# Patient Record
Sex: FEMALE | Race: WHITE | Employment: OTHER | URBAN - METROPOLITAN AREA
[De-identification: names, ages, dates, MRNs, and addresses within clinical notes are randomized per-mention and may not be internally consistent; named-entity substitution may affect disease eponyms.]

---

## 2017-01-13 ENCOUNTER — NURSE ONLY (OUTPATIENT)
Dept: FAMILY MEDICINE CLINIC | Age: 62
End: 2017-01-13

## 2017-01-13 DIAGNOSIS — E53.8 B12 DEFICIENCY: Primary | ICD-10-CM

## 2017-01-13 PROCEDURE — 96372 THER/PROPH/DIAG INJ SC/IM: CPT | Performed by: FAMILY MEDICINE

## 2017-01-13 RX ORDER — CYANOCOBALAMIN 1000 UG/ML
1000 INJECTION INTRAMUSCULAR; SUBCUTANEOUS ONCE
Status: COMPLETED | OUTPATIENT
Start: 2017-01-13 | End: 2017-01-13

## 2017-01-13 RX ADMIN — CYANOCOBALAMIN 1000 MCG: 1000 INJECTION INTRAMUSCULAR; SUBCUTANEOUS at 11:48

## 2017-03-17 ENCOUNTER — NURSE ONLY (OUTPATIENT)
Dept: FAMILY MEDICINE CLINIC | Age: 62
End: 2017-03-17

## 2017-03-17 DIAGNOSIS — E55.9 VITAMIN D DEFICIENCY: Primary | ICD-10-CM

## 2017-03-17 PROCEDURE — 96372 THER/PROPH/DIAG INJ SC/IM: CPT | Performed by: FAMILY MEDICINE

## 2017-03-17 RX ORDER — CYANOCOBALAMIN 1000 UG/ML
1000 INJECTION INTRAMUSCULAR; SUBCUTANEOUS ONCE
Status: COMPLETED | OUTPATIENT
Start: 2017-03-17 | End: 2017-03-17

## 2017-03-17 RX ADMIN — CYANOCOBALAMIN 1000 MCG: 1000 INJECTION INTRAMUSCULAR; SUBCUTANEOUS at 12:02

## 2017-04-03 ENCOUNTER — TELEPHONE (OUTPATIENT)
Dept: FAMILY MEDICINE CLINIC | Age: 62
End: 2017-04-03

## 2017-04-03 DIAGNOSIS — E55.9 VITAMIN D DEFICIENCY: ICD-10-CM

## 2017-04-03 RX ORDER — ERGOCALCIFEROL 1.25 MG/1
50000 CAPSULE ORAL
Qty: 8 CAPSULE | Refills: 5 | Status: SHIPPED | OUTPATIENT
Start: 2017-04-03 | End: 2017-09-14 | Stop reason: SDUPTHER

## 2017-04-08 ENCOUNTER — EMPLOYEE WELLNESS (OUTPATIENT)
Dept: OTHER | Age: 62
End: 2017-04-08

## 2017-04-08 LAB
CHOLESTEROL, TOTAL: 136 MG/DL (ref 0–199)
FASTING: YES
GLUCOSE BLD-MCNC: 90 MG/DL (ref 74–109)
HDLC SERPL-MCNC: 51 MG/DL (ref 40–90)
LDL CHOLESTEROL CALCULATED: 72 MG/DL
TRIGL SERPL-MCNC: 66 MG/DL (ref 0–199)

## 2017-05-17 ENCOUNTER — NURSE ONLY (OUTPATIENT)
Dept: FAMILY MEDICINE CLINIC | Age: 62
End: 2017-05-17

## 2017-05-17 DIAGNOSIS — E53.8 B12 DEFICIENCY: Primary | ICD-10-CM

## 2017-05-17 PROCEDURE — 96372 THER/PROPH/DIAG INJ SC/IM: CPT | Performed by: FAMILY MEDICINE

## 2017-05-17 RX ORDER — CYANOCOBALAMIN 1000 UG/ML
1000 INJECTION INTRAMUSCULAR; SUBCUTANEOUS ONCE
Status: COMPLETED | OUTPATIENT
Start: 2017-05-17 | End: 2017-05-17

## 2017-05-17 RX ADMIN — CYANOCOBALAMIN 1000 MCG: 1000 INJECTION INTRAMUSCULAR; SUBCUTANEOUS at 11:10

## 2017-06-20 RX ORDER — FUROSEMIDE 40 MG/1
40 TABLET ORAL DAILY
Qty: 30 TABLET | Refills: 0 | Status: SHIPPED | OUTPATIENT
Start: 2017-06-20 | End: 2018-03-05 | Stop reason: SDUPTHER

## 2017-07-20 ENCOUNTER — NURSE ONLY (OUTPATIENT)
Dept: FAMILY MEDICINE CLINIC | Age: 62
End: 2017-07-20
Payer: COMMERCIAL

## 2017-07-20 ENCOUNTER — TELEPHONE (OUTPATIENT)
Dept: FAMILY MEDICINE CLINIC | Age: 62
End: 2017-07-20

## 2017-07-20 DIAGNOSIS — E53.8 B12 DEFICIENCY: Primary | ICD-10-CM

## 2017-07-20 PROCEDURE — 96372 THER/PROPH/DIAG INJ SC/IM: CPT | Performed by: FAMILY MEDICINE

## 2017-07-20 RX ORDER — CYANOCOBALAMIN 1000 UG/ML
1000 INJECTION INTRAMUSCULAR; SUBCUTANEOUS ONCE
Status: COMPLETED | OUTPATIENT
Start: 2017-07-20 | End: 2017-07-20

## 2017-07-20 RX ADMIN — CYANOCOBALAMIN 1000 MCG: 1000 INJECTION INTRAMUSCULAR; SUBCUTANEOUS at 11:44

## 2017-07-24 ENCOUNTER — TELEPHONE (OUTPATIENT)
Dept: FAMILY MEDICINE CLINIC | Age: 62
End: 2017-07-24

## 2017-07-26 ENCOUNTER — HOSPITAL ENCOUNTER (OUTPATIENT)
Dept: CT IMAGING | Age: 62
Discharge: HOME | End: 2017-07-26
Payer: COMMERCIAL

## 2017-07-26 ENCOUNTER — OFFICE VISIT (OUTPATIENT)
Dept: FAMILY MEDICINE CLINIC | Age: 62
End: 2017-07-26
Payer: COMMERCIAL

## 2017-07-26 ENCOUNTER — TELEPHONE (OUTPATIENT)
Dept: FAMILY MEDICINE CLINIC | Age: 62
End: 2017-07-26

## 2017-07-26 VITALS
HEIGHT: 68 IN | RESPIRATION RATE: 16 BRPM | DIASTOLIC BLOOD PRESSURE: 82 MMHG | WEIGHT: 268 LBS | SYSTOLIC BLOOD PRESSURE: 110 MMHG | TEMPERATURE: 97.7 F | BODY MASS INDEX: 40.62 KG/M2 | HEART RATE: 64 BPM

## 2017-07-26 DIAGNOSIS — R51.9 PERSISTENT HEADACHES: ICD-10-CM

## 2017-07-26 DIAGNOSIS — M54.2 CERVICAL SPINE PAIN: ICD-10-CM

## 2017-07-26 DIAGNOSIS — S09.90XA HEAD INJURY, INITIAL ENCOUNTER: Primary | ICD-10-CM

## 2017-07-26 DIAGNOSIS — S09.90XA HEAD INJURY, INITIAL ENCOUNTER: ICD-10-CM

## 2017-07-26 PROCEDURE — 99213 OFFICE O/P EST LOW 20 MIN: CPT | Performed by: NURSE PRACTITIONER

## 2017-07-26 PROCEDURE — 72125 CT NECK SPINE W/O DYE: CPT

## 2017-07-26 PROCEDURE — 70450 CT HEAD/BRAIN W/O DYE: CPT

## 2017-07-26 RX ORDER — TIZANIDINE 2 MG/1
2 TABLET ORAL 3 TIMES DAILY
Qty: 30 TABLET | Refills: 0 | Status: SHIPPED | OUTPATIENT
Start: 2017-07-26 | End: 2017-08-05

## 2017-07-26 ASSESSMENT — ENCOUNTER SYMPTOMS
COUGH: 0
APNEA: 0
TROUBLE SWALLOWING: 0
ANAL BLEEDING: 0
NAUSEA: 0
SHORTNESS OF BREATH: 0
BLOOD IN STOOL: 0
CONSTIPATION: 0
ABDOMINAL PAIN: 0
PHOTOPHOBIA: 0
WHEEZING: 0
DIARRHEA: 0
BACK PAIN: 0
VOICE CHANGE: 0
VOMITING: 0
ABDOMINAL DISTENTION: 0

## 2017-09-14 DIAGNOSIS — E55.9 VITAMIN D DEFICIENCY: ICD-10-CM

## 2017-09-14 RX ORDER — ERGOCALCIFEROL 1.25 MG/1
50000 CAPSULE ORAL
Qty: 8 CAPSULE | Refills: 5 | Status: SHIPPED | OUTPATIENT
Start: 2017-09-14 | End: 2018-03-22 | Stop reason: SDUPTHER

## 2017-09-20 ENCOUNTER — NURSE ONLY (OUTPATIENT)
Dept: FAMILY MEDICINE CLINIC | Age: 62
End: 2017-09-20
Payer: COMMERCIAL

## 2017-09-20 DIAGNOSIS — E53.8 B12 DEFICIENCY: Primary | ICD-10-CM

## 2017-09-20 PROCEDURE — 96372 THER/PROPH/DIAG INJ SC/IM: CPT | Performed by: FAMILY MEDICINE

## 2017-09-20 RX ORDER — CYANOCOBALAMIN 1000 UG/ML
1000 INJECTION INTRAMUSCULAR; SUBCUTANEOUS ONCE
Status: COMPLETED | OUTPATIENT
Start: 2017-09-20 | End: 2017-09-20

## 2017-09-20 RX ADMIN — CYANOCOBALAMIN 1000 MCG: 1000 INJECTION INTRAMUSCULAR; SUBCUTANEOUS at 11:18

## 2017-09-25 ENCOUNTER — HOSPITAL ENCOUNTER (OUTPATIENT)
Age: 62
Discharge: HOME | End: 2017-09-25
Payer: COMMERCIAL

## 2017-09-25 DIAGNOSIS — E53.8 B12 DEFICIENCY: ICD-10-CM

## 2017-09-25 LAB — VITAMIN B-12: 591 PG/ML (ref 211–911)

## 2017-09-25 PROCEDURE — 82607 VITAMIN B-12: CPT

## 2017-09-25 PROCEDURE — 36415 COLL VENOUS BLD VENIPUNCTURE: CPT

## 2017-10-31 ENCOUNTER — E-VISIT (OUTPATIENT)
Dept: FAMILY MEDICINE CLINIC | Age: 62
End: 2017-10-31

## 2017-10-31 DIAGNOSIS — J20.9 ACUTE BRONCHITIS, UNSPECIFIED ORGANISM: Primary | ICD-10-CM

## 2017-10-31 PROCEDURE — 99444 PR PHYSICIAN ONLINE EVALUATION & MANAGEMENT SERVICE: CPT | Performed by: FAMILY MEDICINE

## 2017-10-31 RX ORDER — AZITHROMYCIN 250 MG/1
TABLET, FILM COATED ORAL
Qty: 1 PACKET | Refills: 0 | Status: SHIPPED | OUTPATIENT
Start: 2017-10-31 | End: 2017-11-05

## 2017-10-31 ASSESSMENT — LIFESTYLE VARIABLES: SMOKING_STATUS: NO

## 2017-11-03 ENCOUNTER — HOSPITAL ENCOUNTER (OUTPATIENT)
Dept: WOMENS IMAGING | Age: 62
Discharge: HOME | End: 2017-11-03
Payer: COMMERCIAL

## 2017-11-03 DIAGNOSIS — Z12.31 VISIT FOR SCREENING MAMMOGRAM: ICD-10-CM

## 2017-11-03 PROCEDURE — G0202 SCR MAMMO BI INCL CAD: HCPCS

## 2017-11-21 DIAGNOSIS — F32.A DEPRESSION, UNSPECIFIED DEPRESSION TYPE: ICD-10-CM

## 2017-11-21 RX ORDER — FLUOXETINE HYDROCHLORIDE 20 MG/1
20 CAPSULE ORAL DAILY
Qty: 90 CAPSULE | Refills: 3 | Status: SHIPPED | OUTPATIENT
Start: 2017-11-21 | End: 2018-11-20 | Stop reason: SDUPTHER

## 2017-11-22 DIAGNOSIS — F32.A DEPRESSION, UNSPECIFIED DEPRESSION TYPE: ICD-10-CM

## 2017-11-22 RX ORDER — FLUOXETINE HYDROCHLORIDE 20 MG/1
20 CAPSULE ORAL DAILY
Qty: 90 CAPSULE | Refills: 3 | OUTPATIENT
Start: 2017-11-22

## 2017-11-22 NOTE — TELEPHONE ENCOUNTER
From: Elbert Roth  Sent: 11/21/2017 7:47 AM EST  Subject: Medication Renewal Request    Elbert Roth would like a refill of the following medications:  FLUoxetine (PROZAC) 20 MG capsule Darline Roa MD]    Preferred pharmacy: [address] [place] [address], [address] [facility] -  [phone]    Comment:  Please refill my fluoxetine and send to the [address] [place] [address].  Thanks !!

## 2018-01-10 ENCOUNTER — E-VISIT (OUTPATIENT)
Dept: FAMILY MEDICINE CLINIC | Age: 63
End: 2018-01-10
Payer: COMMERCIAL

## 2018-01-10 DIAGNOSIS — J20.9 ACUTE BRONCHITIS, UNSPECIFIED ORGANISM: Primary | ICD-10-CM

## 2018-01-10 PROCEDURE — 99444 PR PHYSICIAN ONLINE EVALUATION & MANAGEMENT SERVICE: CPT | Performed by: FAMILY MEDICINE

## 2018-01-10 RX ORDER — AZITHROMYCIN 250 MG/1
TABLET, FILM COATED ORAL
Qty: 1 PACKET | Refills: 0 | Status: SHIPPED | OUTPATIENT
Start: 2018-01-10 | End: 2018-01-15

## 2018-01-10 ASSESSMENT — LIFESTYLE VARIABLES: SMOKING_STATUS: NO

## 2018-03-05 RX ORDER — FUROSEMIDE 40 MG/1
40 TABLET ORAL DAILY
Qty: 30 TABLET | Refills: 0 | Status: SHIPPED | OUTPATIENT
Start: 2018-03-05 | End: 2018-07-31 | Stop reason: SDUPTHER

## 2018-03-05 NOTE — TELEPHONE ENCOUNTER
From: Hari Gramajo  Sent: 3/5/2018 2:59 PM EST  Subject: Medication Renewal Request    Hari Gramajo would like a refill of the following medications:     furosemide (LASIX) 40 MG tablet Ciara Cedeno MD]    Preferred pharmacy: [address] [place] [address], [address] [place] [phone] - F [phone]    Comment:

## 2018-03-06 ENCOUNTER — EMPLOYEE WELLNESS (OUTPATIENT)
Dept: OTHER | Age: 63
End: 2018-03-06

## 2018-03-06 LAB
CHOLESTEROL, TOTAL: 134 MG/DL (ref 0–199)
FASTING: YES
GLUCOSE BLD-MCNC: 81 MG/DL (ref 74–109)
HDLC SERPL-MCNC: 56 MG/DL (ref 40–90)
LDL CHOLESTEROL CALCULATED: 67 MG/DL
TRIGL SERPL-MCNC: 56 MG/DL (ref 0–199)

## 2018-03-20 VITALS — WEIGHT: 264 LBS | BODY MASS INDEX: 40.01 KG/M2

## 2018-03-21 ENCOUNTER — HOSPITAL ENCOUNTER (OUTPATIENT)
Age: 63
Discharge: HOME | End: 2018-03-21
Payer: COMMERCIAL

## 2018-03-21 DIAGNOSIS — K90.9 INTESTINAL MALABSORPTION, UNSPECIFIED TYPE: ICD-10-CM

## 2018-03-21 DIAGNOSIS — E55.9 VITAMIN D DEFICIENCY: ICD-10-CM

## 2018-03-21 DIAGNOSIS — E53.8 B12 DEFICIENCY: ICD-10-CM

## 2018-03-21 DIAGNOSIS — E83.51 HYPOCALCEMIA: ICD-10-CM

## 2018-03-21 LAB
ALBUMIN SERPL-MCNC: 4.3 G/DL (ref 3.5–5.1)
ALP BLD-CCNC: 90 U/L (ref 38–126)
ALT SERPL-CCNC: 17 U/L (ref 11–66)
ANION GAP SERPL CALCULATED.3IONS-SCNC: 13 MEQ/L (ref 8–16)
AST SERPL-CCNC: 17 U/L (ref 5–40)
BASOPHILS # BLD: 0.7 %
BASOPHILS ABSOLUTE: 0 THOU/MM3 (ref 0–0.1)
BILIRUB SERPL-MCNC: 1.2 MG/DL (ref 0.3–1.2)
BUN BLDV-MCNC: 14 MG/DL (ref 7–22)
CALCIUM SERPL-MCNC: 9.5 MG/DL (ref 8.5–10.5)
CHLORIDE BLD-SCNC: 105 MEQ/L (ref 98–111)
CO2: 26 MEQ/L (ref 23–33)
CREAT SERPL-MCNC: 0.7 MG/DL (ref 0.4–1.2)
EOSINOPHIL # BLD: 2.2 %
EOSINOPHILS ABSOLUTE: 0.1 THOU/MM3 (ref 0–0.4)
GFR SERPL CREATININE-BSD FRML MDRD: 85 ML/MIN/1.73M2
GLUCOSE BLD-MCNC: 89 MG/DL (ref 70–108)
HCT VFR BLD CALC: 40.3 % (ref 37–47)
HEMOGLOBIN: 13.6 GM/DL (ref 12–16)
LYMPHOCYTES # BLD: 34.2 %
LYMPHOCYTES ABSOLUTE: 1.5 THOU/MM3 (ref 1–4.8)
MCH RBC QN AUTO: 32 PG (ref 27–31)
MCHC RBC AUTO-ENTMCNC: 33.7 GM/DL (ref 33–37)
MCV RBC AUTO: 94.9 FL (ref 81–99)
MONOCYTES # BLD: 8.8 %
MONOCYTES ABSOLUTE: 0.4 THOU/MM3 (ref 0.4–1.3)
NUCLEATED RED BLOOD CELLS: 0 /100 WBC
PDW BLD-RTO: 13.6 % (ref 11.5–14.5)
PLATELET # BLD: 198 THOU/MM3 (ref 130–400)
PMV BLD AUTO: 8.3 FL (ref 7.4–10.4)
POTASSIUM SERPL-SCNC: 4.8 MEQ/L (ref 3.5–5.2)
RBC # BLD: 4.24 MILL/MM3 (ref 4.2–5.4)
SEG NEUTROPHILS: 54.1 %
SEGMENTED NEUTROPHILS ABSOLUTE COUNT: 2.4 THOU/MM3 (ref 1.8–7.7)
SODIUM BLD-SCNC: 144 MEQ/L (ref 135–145)
TOTAL PROTEIN: 6.8 G/DL (ref 6.1–8)
VITAMIN B-12: 505 PG/ML (ref 211–911)
VITAMIN D 25-HYDROXY: 30 NG/ML (ref 30–100)
WBC # BLD: 4.4 THOU/MM3 (ref 4.8–10.8)

## 2018-03-21 PROCEDURE — 80053 COMPREHEN METABOLIC PANEL: CPT

## 2018-03-21 PROCEDURE — 82607 VITAMIN B-12: CPT

## 2018-03-21 PROCEDURE — 85025 COMPLETE CBC W/AUTO DIFF WBC: CPT

## 2018-03-21 PROCEDURE — 36415 COLL VENOUS BLD VENIPUNCTURE: CPT

## 2018-03-21 PROCEDURE — 82306 VITAMIN D 25 HYDROXY: CPT

## 2018-03-22 ENCOUNTER — TELEPHONE (OUTPATIENT)
Dept: FAMILY MEDICINE CLINIC | Age: 63
End: 2018-03-22

## 2018-03-22 DIAGNOSIS — E55.9 VITAMIN D DEFICIENCY: ICD-10-CM

## 2018-03-22 RX ORDER — ERGOCALCIFEROL 1.25 MG/1
50000 CAPSULE ORAL
Qty: 8 CAPSULE | Refills: 2 | Status: SHIPPED | OUTPATIENT
Start: 2018-03-22 | End: 2018-06-15 | Stop reason: SDUPTHER

## 2018-03-22 NOTE — TELEPHONE ENCOUNTER
From: Riana Gong  Sent: 3/22/2018 10:44 AM EDT  Subject: Medication Renewal Request    Riana Gong would like a refill of the following medications:     vitamin D (ERGOCALCIFEROL) 22541 units CAPS capsule Dulce Matthew MD]   Patient Comment: Just took my last one yesterday. Thanks for the call with info on my lab work too.      Preferred pharmacy: [address] [place] [address], [address] [place] [address]

## 2018-03-22 NOTE — TELEPHONE ENCOUNTER
----- Message from Olivia Lowery MD sent at 3/21/2018  4:54 PM EDT -----  Please notify her that her Vitamin D and B12 levels are normal.  Continue current supplements. CMP ok. CBC shows slightly suppressed WBC count, which is chronic for her and stable. Continue current and follow up with me at least yearly.  CG

## 2018-03-28 ENCOUNTER — OFFICE VISIT (OUTPATIENT)
Dept: FAMILY MEDICINE CLINIC | Age: 63
End: 2018-03-28
Payer: COMMERCIAL

## 2018-03-28 ENCOUNTER — TELEPHONE (OUTPATIENT)
Dept: FAMILY MEDICINE CLINIC | Age: 63
End: 2018-03-28

## 2018-03-28 VITALS
BODY MASS INDEX: 41.21 KG/M2 | TEMPERATURE: 97.7 F | HEART RATE: 64 BPM | RESPIRATION RATE: 20 BRPM | SYSTOLIC BLOOD PRESSURE: 108 MMHG | WEIGHT: 271 LBS | DIASTOLIC BLOOD PRESSURE: 68 MMHG

## 2018-03-28 DIAGNOSIS — R00.2 HEART PALPITATIONS: Primary | ICD-10-CM

## 2018-03-28 PROCEDURE — 93000 ELECTROCARDIOGRAM COMPLETE: CPT | Performed by: NURSE PRACTITIONER

## 2018-03-28 PROCEDURE — 99214 OFFICE O/P EST MOD 30 MIN: CPT | Performed by: NURSE PRACTITIONER

## 2018-03-28 ASSESSMENT — ENCOUNTER SYMPTOMS
TROUBLE SWALLOWING: 0
CHEST TIGHTNESS: 0
SINUS PRESSURE: 0
RHINORRHEA: 0
EYE DISCHARGE: 0
EYE ITCHING: 0
SORE THROAT: 0
DIARRHEA: 0
EYE REDNESS: 0
NAUSEA: 0
WHEEZING: 0
VOMITING: 0
COUGH: 0
BACK PAIN: 0
ABDOMINAL PAIN: 0
EYE PAIN: 0
SHORTNESS OF BREATH: 0
CONSTIPATION: 0

## 2018-03-28 ASSESSMENT — PATIENT HEALTH QUESTIONNAIRE - PHQ9
1. LITTLE INTEREST OR PLEASURE IN DOING THINGS: 0
2. FEELING DOWN, DEPRESSED OR HOPELESS: 0
SUM OF ALL RESPONSES TO PHQ QUESTIONS 1-9: 0
SUM OF ALL RESPONSES TO PHQ9 QUESTIONS 1 & 2: 0

## 2018-03-28 NOTE — TELEPHONE ENCOUNTER
Patient is requesting an appointment either with Dr. [facility] or with a nurse practitioner. She has been having heart palpitations that come and go for the last one to two months. Yesterday it was really noticeable. She had a tooth filled and she felt the palpitations before she went in to the office but when she left she was shaky. Not sure if some of that was from nerves. She also had palpitations Sunday night, she was wide awake and couldn't sleep. Her BP was 144/89. No chest, jaw or arm pain, no shortness of breath. She is tired. Please advise on an appointment.

## 2018-03-28 NOTE — PATIENT INSTRUCTIONS
Patient Education        Palpitations: Care Instructions  Your Care Instructions    Heart palpitations are the uncomfortable sensation that your heart is beating fast or irregularly. You might feel pounding or fluttering in your chest. It might feel like your heart is skipping a beat. Although palpitations may be caused by a heart problem, they also occur because of stress, fatigue, or use of alcohol, caffeine, or nicotine. Many medicines, including diet pills, antihistamines, decongestants, and some herbal products, can cause heart palpitations. Nearly everyone has palpitations from time to time. Depending on your symptoms, your doctor may need to do more tests to try to find the cause of your palpitations. Follow-up care is a key part of your treatment and safety. Be sure to make and go to all appointments, and call your doctor if you are having problems. It's also a good idea to know your test results and keep a list of the medicines you take. How can you care for yourself at home? · Avoid caffeine, nicotine, and excess alcohol. · Do not take illegal drugs, such as methamphetamines and cocaine. · Do not take weight loss or diet medicines unless you talk with your doctor first.  · Get plenty of sleep. · Do not overeat. · If you have palpitations again, take deep breaths and try to relax. This may slow a racing heart. · If you start to feel lightheaded, lie down to avoid injuries that might result if you pass out and fall down. · Keep a record of your palpitations and bring it to your next doctor's appointment. Write down:  ¨ The date and time. ¨ Your pulse. (If your heart is beating fast, it may be hard to count your pulse.)  ¨ What you were doing when the palpitations started. ¨ How long the palpitations lasted. ¨ Any other symptoms. · If an activity causes palpitations, slow down or stop. Talk to your doctor before you do that activity again. · Take your medicines exactly as prescribed.  Call your doctor if you think you are having a problem with your medicine. When should you call for help? Call 911 anytime you think you may need emergency care. For example, call if:  ? · You passed out (lost consciousness). ? · You have symptoms of a heart attack. These may include:  ¨ Chest pain or pressure, or a strange feeling in the chest.  ¨ Sweating. ¨ Shortness of breath. ¨ Pain, pressure, or a strange feeling in the back, neck, jaw, or upper belly or in one or both shoulders or arms. ¨ Lightheadedness or sudden weakness. ¨ A fast or irregular heartbeat. After you call 911, the  may tell you to chew 1 adult-strength or 2 to 4 low-dose aspirin. Wait for an ambulance. Do not try to drive yourself. ? · You have symptoms of a stroke. These may include:  ¨ Sudden numbness, tingling, weakness, or loss of movement in your face, arm, or leg, especially on only one side of your body. ¨ Sudden vision changes. ¨ Sudden trouble speaking. ¨ Sudden confusion or trouble understanding simple statements. ¨ Sudden problems with walking or balance. ¨ A sudden, severe headache that is different from past headaches. ?Call your doctor now or seek immediate medical care if:  ? · You have heart palpitations and:  ¨ Are dizzy or lightheaded, or you feel like you may faint. ¨ Have new or increased shortness of breath. ? Watch closely for changes in your health, and be sure to contact your doctor if:  ? · You continue to have heart palpitations. Where can you learn more? Go to https://Rohati Systemsdora.Modabound. org and sign in to your ncyclo account. Enter R508 in the [facility] box to learn more about \"Palpitations: Care Instructions. \"     If you do not have an account, please click on the \"Sign Up Now\" link. Current as of: September 21, 2016  Content Version: 11.5  © 7372-3141 Healthwise, Incorporated. Care instructions adapted under license by [facility] ([facility]).  If you have questions about a medical condition or this instruction, always ask your healthcare professional. [place] [address] any warranty or liability for your use of this information.

## 2018-04-02 VITALS — BODY MASS INDEX: 41.81 KG/M2 | WEIGHT: 275 LBS

## 2018-04-06 ENCOUNTER — HOSPITAL ENCOUNTER (OUTPATIENT)
Age: 63
Discharge: HOME | End: 2018-04-06
Payer: COMMERCIAL

## 2018-04-06 DIAGNOSIS — R00.2 HEART PALPITATIONS: ICD-10-CM

## 2018-04-06 LAB
T4 FREE: 0.98 NG/DL (ref 0.93–1.76)
TSH SERPL DL<=0.05 MIU/L-ACNC: 3.45 UIU/ML (ref 0.4–4.2)

## 2018-04-06 PROCEDURE — 36415 COLL VENOUS BLD VENIPUNCTURE: CPT

## 2018-04-06 PROCEDURE — 84439 ASSAY OF FREE THYROXINE: CPT

## 2018-04-06 PROCEDURE — 84443 ASSAY THYROID STIM HORMONE: CPT

## 2018-04-11 ENCOUNTER — OFFICE VISIT (OUTPATIENT)
Dept: BARIATRICS/WEIGHT MGMT | Age: 63
End: 2018-04-11

## 2018-04-11 DIAGNOSIS — E66.01 MORBID OBESITY DUE TO EXCESS CALORIES (HCC): Primary | ICD-10-CM

## 2018-04-13 ENCOUNTER — HOSPITAL ENCOUNTER (OUTPATIENT)
Dept: NON INVASIVE DIAGNOSTICS | Age: 63
Discharge: HOME | End: 2018-04-13
Payer: COMMERCIAL

## 2018-04-13 VITALS — WEIGHT: 269 LBS | HEIGHT: 68 IN | BODY MASS INDEX: 40.77 KG/M2

## 2018-04-13 DIAGNOSIS — R00.2 HEART PALPITATIONS: ICD-10-CM

## 2018-04-13 LAB
LV EF: 69 %
LVEF MODALITY: NORMAL

## 2018-04-13 PROCEDURE — 93226 XTRNL ECG REC<48 HR SCAN A/R: CPT

## 2018-04-13 PROCEDURE — 3430000000 HC RX DIAGNOSTIC RADIOPHARMACEUTICAL: Performed by: NURSE PRACTITIONER

## 2018-04-13 PROCEDURE — 93017 CV STRESS TEST TRACING ONLY: CPT | Performed by: INTERNAL MEDICINE

## 2018-04-13 PROCEDURE — 78452 HT MUSCLE IMAGE SPECT MULT: CPT

## 2018-04-13 PROCEDURE — A9500 TC99M SESTAMIBI: HCPCS | Performed by: NURSE PRACTITIONER

## 2018-04-13 PROCEDURE — 93225 XTRNL ECG REC<48 HRS REC: CPT

## 2018-04-13 RX ADMIN — Medication 9.7 MILLICURIE: at 08:05

## 2018-04-13 RX ADMIN — Medication 32 MILLICURIE: at 09:20

## 2018-04-18 ENCOUNTER — TELEPHONE (OUTPATIENT)
Dept: FAMILY MEDICINE CLINIC | Age: 63
End: 2018-04-18

## 2018-04-18 DIAGNOSIS — R94.31 ABNORMAL HOLTER MONITOR FINDING: ICD-10-CM

## 2018-04-18 DIAGNOSIS — R00.2 HEART PALPITATIONS: Primary | ICD-10-CM

## 2018-04-25 ENCOUNTER — OFFICE VISIT (OUTPATIENT)
Dept: CARDIOLOGY CLINIC | Age: 63
End: 2018-04-25
Payer: COMMERCIAL

## 2018-04-25 VITALS
WEIGHT: 272.8 LBS | SYSTOLIC BLOOD PRESSURE: 136 MMHG | BODY MASS INDEX: 41.34 KG/M2 | HEIGHT: 68 IN | HEART RATE: 64 BPM | DIASTOLIC BLOOD PRESSURE: 62 MMHG

## 2018-04-25 DIAGNOSIS — R00.2 PALPITATIONS: Primary | ICD-10-CM

## 2018-04-25 PROCEDURE — 99204 OFFICE O/P NEW MOD 45 MIN: CPT | Performed by: INTERNAL MEDICINE

## 2018-04-25 ASSESSMENT — ENCOUNTER SYMPTOMS
DIARRHEA: 0
NAUSEA: 0
SHORTNESS OF BREATH: 0
ABDOMINAL PAIN: 0
SORE THROAT: 0
COUGH: 0
EYE PAIN: 0
EYE REDNESS: 0
BACK PAIN: 0
CHEST TIGHTNESS: 0
BLOOD IN STOOL: 0
EYE ITCHING: 0
APNEA: 0
SINUS PRESSURE: 0
ABDOMINAL DISTENTION: 0
CONSTIPATION: 0
EYE DISCHARGE: 0

## 2018-05-02 ENCOUNTER — OFFICE VISIT (OUTPATIENT)
Dept: BARIATRICS/WEIGHT MGMT | Age: 63
End: 2018-05-02

## 2018-05-02 DIAGNOSIS — Z98.84 STATUS POST BARIATRIC SURGERY: Primary | ICD-10-CM

## 2018-05-04 ENCOUNTER — HOSPITAL ENCOUNTER (OUTPATIENT)
Dept: NON INVASIVE DIAGNOSTICS | Age: 63
Discharge: HOME | End: 2018-05-04
Payer: COMMERCIAL

## 2018-05-04 DIAGNOSIS — R00.2 PALPITATIONS: ICD-10-CM

## 2018-05-04 LAB
LV EF: 60 %
LVEF MODALITY: NORMAL

## 2018-05-04 PROCEDURE — 93306 TTE W/DOPPLER COMPLETE: CPT

## 2018-06-15 DIAGNOSIS — E55.9 VITAMIN D DEFICIENCY: ICD-10-CM

## 2018-06-15 RX ORDER — ERGOCALCIFEROL 1.25 MG/1
50000 CAPSULE ORAL
Qty: 24 CAPSULE | Refills: 3 | Status: SHIPPED | OUTPATIENT
Start: 2018-06-18 | End: 2019-05-15 | Stop reason: SDUPTHER

## 2018-07-31 ENCOUNTER — OFFICE VISIT (OUTPATIENT)
Dept: FAMILY MEDICINE CLINIC | Age: 63
End: 2018-07-31
Payer: COMMERCIAL

## 2018-07-31 VITALS
WEIGHT: 277.4 LBS | TEMPERATURE: 98.1 F | RESPIRATION RATE: 16 BRPM | DIASTOLIC BLOOD PRESSURE: 74 MMHG | BODY MASS INDEX: 42.18 KG/M2 | SYSTOLIC BLOOD PRESSURE: 112 MMHG | HEART RATE: 64 BPM

## 2018-07-31 DIAGNOSIS — E66.01 MORBID OBESITY WITH BMI OF 40.0-44.9, ADULT (HCC): ICD-10-CM

## 2018-07-31 DIAGNOSIS — R60.0 LEG EDEMA: ICD-10-CM

## 2018-07-31 DIAGNOSIS — Z00.00 ANNUAL PHYSICAL EXAM: Primary | ICD-10-CM

## 2018-07-31 PROCEDURE — 99396 PREV VISIT EST AGE 40-64: CPT | Performed by: FAMILY MEDICINE

## 2018-07-31 RX ORDER — FUROSEMIDE 40 MG/1
40 TABLET ORAL DAILY
Qty: 30 TABLET | Refills: 1 | Status: SHIPPED | OUTPATIENT
Start: 2018-07-31 | End: 2019-05-06 | Stop reason: SDUPTHER

## 2018-07-31 ASSESSMENT — ENCOUNTER SYMPTOMS
DIARRHEA: 0
BLOOD IN STOOL: 0
NAUSEA: 0
VOMITING: 0
ABDOMINAL PAIN: 0
SHORTNESS OF BREATH: 0
EYES NEGATIVE: 1

## 2018-07-31 NOTE — PROGRESS NOTES
Chief Complaint   Patient presents with    Annual Exam     Here for annual PE. No labs done for this visit. Doing ok.  Medication Refill     Order pended. SUBJECTIVE     Pato Rolon is a 59 y. o.female    Pt presents for annual wellness physical exam.        Pt stable since last visit- no new problems for diagnoses listed below:  Patient Active Problem List   Diagnosis    Hypocalcemia    Secondary hyperparathyroidism ([facility] [address]    Osteopenia    Vitamin D deficiency    Cervicalgia    Lumbago    Depression    Malabsorption    B12 deficiency    Sleep disturbances    Poor memory    Non-restorative sleep    Morbid obesity with BMI of 40.0-44.9, adult ([address]     Doing well. No complaints today. Still having some palpitations but saw cardiology and had a full benign workup. Denies dizziness, lightheadedness, CP, SOB. Her chronic conditions are stable. She remains active and exercises. Working on getting her weight down. She did go to the weight management center for a few sessions. She is s/p gastric bypass in the past.  No recent illnesses or hospitalizations. Nonsmoker. Body mass index is 42.18 kg/m². Review of Systems   Constitutional: Positive for unexpected weight change (gain). Negative for chills, fatigue and fever. HENT: Negative. Eyes: Negative. Respiratory: Negative for shortness of breath. Cardiovascular: Negative for chest pain, palpitations and leg swelling. Gastrointestinal: Negative for abdominal pain, blood in stool, diarrhea, nausea and vomiting. Genitourinary: Negative for dysuria. Musculoskeletal: Negative for arthralgias and myalgias. Skin: Negative for rash. Neurological: Negative for dizziness and headaches. Hematological: Negative. Psychiatric/Behavioral: Negative. All other systems reviewed and are negative.           OBJECTIVE     /74 (Site: Left Arm, Position: Sitting, Cuff Size: Large Adult)   Pulse 64   Temp 98.1 °F (36.7

## 2018-09-13 ENCOUNTER — OFFICE VISIT (OUTPATIENT)
Dept: FAMILY MEDICINE CLINIC | Age: 63
End: 2018-09-13
Payer: COMMERCIAL

## 2018-09-13 VITALS
TEMPERATURE: 97.8 F | WEIGHT: 276 LBS | HEART RATE: 64 BPM | BODY MASS INDEX: 41.97 KG/M2 | RESPIRATION RATE: 16 BRPM | DIASTOLIC BLOOD PRESSURE: 72 MMHG | SYSTOLIC BLOOD PRESSURE: 96 MMHG

## 2018-09-13 DIAGNOSIS — M25.512 ACUTE PAIN OF LEFT SHOULDER: Primary | ICD-10-CM

## 2018-09-13 DIAGNOSIS — H61.23 BILATERAL IMPACTED CERUMEN: ICD-10-CM

## 2018-09-13 PROCEDURE — 69209 REMOVE IMPACTED EAR WAX UNI: CPT | Performed by: NURSE PRACTITIONER

## 2018-09-13 PROCEDURE — 99213 OFFICE O/P EST LOW 20 MIN: CPT | Performed by: NURSE PRACTITIONER

## 2018-09-13 RX ORDER — METHYLPREDNISOLONE 4 MG/1
TABLET ORAL
Qty: 1 KIT | Refills: 0 | Status: SHIPPED | OUTPATIENT
Start: 2018-09-13 | End: 2018-09-19

## 2018-09-13 RX ORDER — CYCLOBENZAPRINE HCL 5 MG
5 TABLET ORAL 3 TIMES DAILY PRN
Qty: 30 TABLET | Refills: 0 | Status: SHIPPED | OUTPATIENT
Start: 2018-09-13 | End: 2019-03-26 | Stop reason: SDUPTHER

## 2018-09-13 RX ORDER — IBUPROFEN 200 MG
200 TABLET ORAL EVERY 6 HOURS PRN
COMMUNITY

## 2018-09-13 ASSESSMENT — ENCOUNTER SYMPTOMS
BACK PAIN: 0
COUGH: 0

## 2018-09-13 NOTE — PROGRESS NOTES
[facility] [place] [address] [address]  Dept: [phone]  Dept Fax: [phone]: [phone]     Visit Date:  9/13/2018      Patient:  Nestor John  YOB: 1955    HPI:     Chief Complaint   Patient presents with    Shoulder Pain     C/O left shoulder pain x 2 weeks, no injury. Using Motrin or Aleve.  Cerumen Impaction     C/O cerumen impaction in left ear, requesting to have it flushed out. Shoulder Pain    The pain is present in the left shoulder. This is a new problem. Episode onset: 2-3 weeks ago, increased Saturday. There has been no history of extremity trauma. The problem occurs daily. The problem has been waxing and waning. The quality of the pain is described as aching. The patient is experiencing no pain. Associated symptoms include a limited range of motion and stiffness. Pertinent negatives include no fever, joint swelling, numbness or tingling. The symptoms are aggravated by activity and lying down. She has tried NSAIDS and acetaminophen for the symptoms. The treatment provided moderate relief. Family history does not include gout or rheumatoid arthritis. There is no history of diabetes, gout or osteoarthritis. Ear Fullness    There is pain in both ears. This is a new problem. Episode onset: a couple of months. The problem has been unchanged. There has been no fever. The patient is experiencing no pain. Pertinent negatives include no coughing or hearing loss. She has tried nothing for the symptoms. There is no history of a chronic ear infection or hearing loss.            Medications    Current Outpatient Prescriptions:     ibuprofen (ADVIL;MOTRIN) 200 MG tablet, Take 200 mg by mouth every 6 hours as needed for Pain, Disp: , Rfl:     methylPREDNISolone (MEDROL DOSEPACK) 4 MG tablet, Take by mouth., Disp: 1 kit, Rfl: 0    cyclobenzaprine (FLEXERIL) 5 MG tablet, Take 1 tablet by mouth 3 times Neurological: She is alert. Coordination normal.   Skin: Skin is warm and dry. Psychiatric: She has a normal mood and affect. Her behavior is normal. Judgment and thought content normal.   Vitals reviewed. Assessment/Plan:      Jameel Tuttle was seen today for shoulder pain and cerumen impaction. Diagnoses and all orders for this visit:    Acute pain of left shoulder  -     methylPREDNISolone (MEDROL DOSEPACK) 4 MG tablet; Take by mouth. -     cyclobenzaprine (FLEXERIL) 5 MG tablet; Take 1 tablet by mouth 3 times daily as needed for Muscle spasms    Bilateral impacted cerumen    Ears flushed bilaterally in the office- brown wax removed. Pt would like to hold on referral for left shoulder pain and decreased ROM at this time, will try conservative treatment now. Return if symptoms worsen or fail to improve. Patient instructions given and reviewed.         Electronically signed by PATEL Srinivasan CNP on 9/13/2018 at 12:04 PM

## 2018-10-13 ENCOUNTER — E-VISIT (OUTPATIENT)
Dept: FAMILY MEDICINE CLINIC | Age: 63
End: 2018-10-13
Payer: COMMERCIAL

## 2018-10-13 DIAGNOSIS — J06.9 ACUTE URI: Primary | ICD-10-CM

## 2018-10-13 PROCEDURE — 98969 PR NONPHYSICIAN ONLINE ASSESSMENT AND MANAGEMENT: CPT | Performed by: NURSE PRACTITIONER

## 2018-10-13 RX ORDER — AZITHROMYCIN 250 MG/1
TABLET, FILM COATED ORAL
Qty: 6 TABLET | Refills: 0 | Status: SHIPPED | OUTPATIENT
Start: 2018-10-13 | End: 2018-10-13 | Stop reason: SDUPTHER

## 2018-10-13 RX ORDER — AZITHROMYCIN 250 MG/1
TABLET, FILM COATED ORAL
Qty: 6 TABLET | Refills: 0 | Status: SHIPPED | OUTPATIENT
Start: 2018-10-13 | End: 2018-10-18

## 2018-10-13 ASSESSMENT — LIFESTYLE VARIABLES: SMOKING_STATUS: NO

## 2018-11-20 DIAGNOSIS — F32.A DEPRESSION, UNSPECIFIED DEPRESSION TYPE: ICD-10-CM

## 2018-11-20 RX ORDER — FLUOXETINE HYDROCHLORIDE 20 MG/1
20 CAPSULE ORAL DAILY
Qty: 90 CAPSULE | Refills: 3 | Status: SHIPPED | OUTPATIENT
Start: 2018-11-20 | End: 2019-10-30 | Stop reason: SDUPTHER

## 2018-11-20 NOTE — TELEPHONE ENCOUNTER
From: Shona Lazo  Sent: 11/20/2018 11:13 AM EST  Subject: Medication Renewal Request    Shona Lazo would like a refill of the following medications:     FLUoxetine (PROZAC) 20 MG capsule Antonino Elliott MD]    Preferred pharmacy: [address] [place] [address], [address] [place] [address]

## 2019-01-09 ENCOUNTER — HOSPITAL ENCOUNTER (OUTPATIENT)
Dept: WOMENS IMAGING | Age: 64
Discharge: HOME | End: 2019-01-09
Payer: COMMERCIAL

## 2019-01-09 DIAGNOSIS — Z12.31 VISIT FOR SCREENING MAMMOGRAM: ICD-10-CM

## 2019-01-09 PROCEDURE — 77063 BREAST TOMOSYNTHESIS BI: CPT

## 2019-03-07 ENCOUNTER — E-VISIT (OUTPATIENT)
Dept: FAMILY MEDICINE CLINIC | Age: 64
End: 2019-03-07
Payer: COMMERCIAL

## 2019-03-07 DIAGNOSIS — J01.90 ACUTE BACTERIAL SINUSITIS: Primary | ICD-10-CM

## 2019-03-07 DIAGNOSIS — B96.89 ACUTE BACTERIAL SINUSITIS: Primary | ICD-10-CM

## 2019-03-07 PROCEDURE — 99444 PR PHYSICIAN ONLINE EVALUATION & MANAGEMENT SERVICE: CPT | Performed by: FAMILY MEDICINE

## 2019-03-07 RX ORDER — DOXYCYCLINE HYCLATE 100 MG
100 TABLET ORAL 2 TIMES DAILY
Qty: 20 TABLET | Refills: 0 | Status: SHIPPED | OUTPATIENT
Start: 2019-03-07 | End: 2019-03-17

## 2019-03-07 ASSESSMENT — LIFESTYLE VARIABLES: SMOKING_STATUS: NO, I'VE NEVER SMOKED

## 2019-03-26 ENCOUNTER — OFFICE VISIT (OUTPATIENT)
Dept: FAMILY MEDICINE CLINIC | Age: 64
End: 2019-03-26
Payer: COMMERCIAL

## 2019-03-26 VITALS
HEART RATE: 60 BPM | DIASTOLIC BLOOD PRESSURE: 70 MMHG | WEIGHT: 279.4 LBS | SYSTOLIC BLOOD PRESSURE: 112 MMHG | TEMPERATURE: 98.2 F | BODY MASS INDEX: 42.48 KG/M2 | RESPIRATION RATE: 18 BRPM

## 2019-03-26 DIAGNOSIS — M25.551 CHRONIC HIP PAIN, RIGHT: ICD-10-CM

## 2019-03-26 DIAGNOSIS — G89.29 CHRONIC HIP PAIN, RIGHT: ICD-10-CM

## 2019-03-26 DIAGNOSIS — M54.2 MUSCULOSKELETAL NECK PAIN: Primary | ICD-10-CM

## 2019-03-26 PROCEDURE — 99213 OFFICE O/P EST LOW 20 MIN: CPT | Performed by: NURSE PRACTITIONER

## 2019-03-26 RX ORDER — CYCLOBENZAPRINE HCL 5 MG
5 TABLET ORAL 3 TIMES DAILY PRN
Qty: 30 TABLET | Refills: 0 | Status: SHIPPED | OUTPATIENT
Start: 2019-03-26 | End: 2019-04-05

## 2019-03-26 ASSESSMENT — ENCOUNTER SYMPTOMS
TROUBLE SWALLOWING: 0
COLOR CHANGE: 0
VISUAL CHANGE: 0
BACK PAIN: 1

## 2019-05-06 ENCOUNTER — OFFICE VISIT (OUTPATIENT)
Dept: FAMILY MEDICINE CLINIC | Age: 64
End: 2019-05-06
Payer: COMMERCIAL

## 2019-05-06 VITALS
DIASTOLIC BLOOD PRESSURE: 76 MMHG | HEIGHT: 68 IN | WEIGHT: 272.2 LBS | BODY MASS INDEX: 41.25 KG/M2 | SYSTOLIC BLOOD PRESSURE: 114 MMHG | RESPIRATION RATE: 12 BRPM | HEART RATE: 60 BPM

## 2019-05-06 DIAGNOSIS — E55.9 VITAMIN D DEFICIENCY: ICD-10-CM

## 2019-05-06 DIAGNOSIS — M70.61 TROCHANTERIC BURSITIS, RIGHT HIP: ICD-10-CM

## 2019-05-06 DIAGNOSIS — R60.0 LEG EDEMA: ICD-10-CM

## 2019-05-06 DIAGNOSIS — Z00.00 ANNUAL PHYSICAL EXAM: Primary | ICD-10-CM

## 2019-05-06 DIAGNOSIS — E53.8 B12 DEFICIENCY: ICD-10-CM

## 2019-05-06 DIAGNOSIS — Z98.84 S/P BARIATRIC SURGERY: ICD-10-CM

## 2019-05-06 PROCEDURE — 99396 PREV VISIT EST AGE 40-64: CPT | Performed by: FAMILY MEDICINE

## 2019-05-06 RX ORDER — FUROSEMIDE 40 MG/1
40 TABLET ORAL DAILY
Qty: 30 TABLET | Refills: 1 | Status: SHIPPED | OUTPATIENT
Start: 2019-05-06 | End: 2020-07-14 | Stop reason: SDUPTHER

## 2019-05-06 ASSESSMENT — ENCOUNTER SYMPTOMS
DIARRHEA: 0
NAUSEA: 0
VOMITING: 0
EYES NEGATIVE: 1
SHORTNESS OF BREATH: 0
BLOOD IN STOOL: 0
ABDOMINAL PAIN: 0

## 2019-05-06 NOTE — PROGRESS NOTES
Chief Complaint   Patient presents with    Hip Pain     right x2 months. ..lasix refill and BW orders       SUBJECTIVE     Landrum Primrose is a 59 y. o.female    Pt presents for annual wellness physical exam.        Pt stable since last visit- no new problems for diagnoses listed below:  Patient Active Problem List   Diagnosis    Hypocalcemia    Secondary hyperparathyroidism ([facility] [address]    Osteopenia    Vitamin D deficiency    Cervicalgia    Lumbago    Depression    Malabsorption    B12 deficiency    Sleep disturbances    Poor memory    Non-restorative sleep    Morbid obesity with BMI of 40.0-44.9, adult ([address]     Doing well except for recurrent right trochanteric bursitis. Has had it off and on since 2016. Has seen ortho and had injections, which help temporarily. She notices that it flares up if she walks on a treadmill. She is wondering about preventive therapies at this point and would like to see Dr. Vincent Sampson. Her chronic conditions are stable. She is due for labs. Is considering Shingrix. Will get [facility] Well labs done through [facility]. Nonsmoker. Body mass index is 41.39 kg/m². She uses lasix prn for leg edema. She denies any current issues with it. Review of Systems   Constitutional: Negative for chills, fatigue and fever. HENT: Negative. Eyes: Negative. Respiratory: Negative for shortness of breath. Cardiovascular: Positive for leg swelling (intermittent). Negative for chest pain and palpitations. Gastrointestinal: Negative for abdominal pain, blood in stool, diarrhea, nausea and vomiting. Genitourinary: Negative for dysuria. Musculoskeletal: Negative for arthralgias and myalgias. Right troch bursitis   Skin: Negative for rash. Neurological: Negative for dizziness and headaches. Hematological: Negative. Psychiatric/Behavioral: Negative. All other systems reviewed and are negative.           OBJECTIVE     /76   Pulse 60   Resp 12   Ht 5' 8\" (1.727 m)   Wt 272 lb 3.2 oz (123.5 kg)   BMI 41.39 kg/m²     Wt Readings from Last 3 Encounters:   05/06/19 272 lb 3.2 oz (123.5 kg)   03/26/19 279 lb 6.4 oz (126.7 kg)   09/13/18 276 lb (125.2 kg)       Physical Exam   Constitutional: She is oriented to person, place, and time. She appears well-developed and well-nourished. HENT:   Head: Normocephalic and atraumatic. Mouth/Throat: Oropharynx is clear and moist.   TM's normal.   Eyes: Conjunctivae are normal.   Neck: Neck supple. Carotid bruit is not present. No thyromegaly present. Cardiovascular: Normal rate, regular rhythm and normal heart sounds. No murmur heard. Pulmonary/Chest: Effort normal and breath sounds normal. She has no wheezes. Abdominal: Soft. Bowel sounds are normal. There is no tenderness. There is no rebound and no guarding. Musculoskeletal: She exhibits edema (trace LE edema to the knees). Right hip: She exhibits normal range of motion, no tenderness, no bony tenderness and no deformity. Legs:  Lymphadenopathy:     She has no cervical adenopathy. Neurological: She is alert and oriented to person, place, and time. Skin: Skin is warm and dry. No rash noted. Psychiatric: She has a normal mood and affect. Her behavior is normal.   Vitals reviewed.           Immunization History   Administered Date(s) Administered    Influenza Vaccine, unspecified formulation 12/01/2016    Influenza Virus Vaccine 10/30/2012, 11/27/2013, 09/30/2014, 10/01/2015, 10/26/2017, 10/25/2018    Td, unspecified formulation 01/02/2016    Zoster Live (Zostavax) 11/20/2012         Health Maintenance   Topic Date Due    Shingles Vaccine (2 of 3) 01/15/2013    DTaP/Tdap/Td vaccine (1 - Tdap) 01/03/2016    Potassium monitoring  03/21/2019    Creatinine monitoring  03/21/2019    Hepatitis C screen  07/26/2056 (Originally 1955)    HIV screen  07/26/2057 (Originally 6/7/1971)    Breast cancer screen  01/09/2021    Colon cancer screen colonoscopy  11/15/2022    Lipid screen  03/06/2023    Flu vaccine  Completed    Pneumococcal 0-64 years Vaccine  Aged Out         ASSESSMENT      1. Annual physical exam    2. Leg edema    3. B12 deficiency    4. Vitamin D deficiency    5. S/P bariatric surgery    6. Trochanteric bursitis, right hip        PLAN        Orders Placed This Encounter   Procedures    CBC Auto Differential     Standing Status:   Future     Standing Expiration Date:   5/6/2020    Comprehensive Metabolic Panel     Standing Status:   Future     Standing Expiration Date:   5/5/2020    TSH with Reflex     Standing Status:   Future     Standing Expiration Date:   5/6/2020    Vitamin D 25 Hydroxy     Standing Status:   Future     Standing Expiration Date:   5/5/2020    Vitamin B12     Standing Status:   Future     Standing Expiration Date:   5/6/2020   Brooke Do MD, Orthopedic Surgery, [facility]     Referral Priority:   Routine     Referral Type:   Eval and Treat     Referral Reason:   Specialty Services Required     Referred to Provider:   Brianna Roper MD     Requested Specialty:   Orthopedic Surgery     Number of Visits Requested:   1       Requested Prescriptions     Signed Prescriptions Disp Refills    furosemide (LASIX) 40 MG tablet 30 tablet 1     Sig: Take 1 tablet by mouth daily       Nelly received counseling on the following healthy behaviors: exercise and medication adherence    Patient given educational materials on wellness for age. Discussed use, benefit, and side effects of prescribed medications. Barriers to medication compliance addressed. All patient questions answered. Pt voiced understanding.          Electronically signed by Addison Hassan MD on 5/6/2019 at 12:11 PM

## 2019-05-06 NOTE — PROGRESS NOTES
appt scheduled with Dr. Vincent Sampson on May 9, 2019 at  910 am. Pt notified of appt date and time

## 2019-05-06 NOTE — PATIENT INSTRUCTIONS
Patient Education        Well Visit, Women 48 to 72: Care Instructions  Your Care Instructions    Physical exams can help you stay healthy. Your doctor has checked your overall health and may have suggested ways to take good care of yourself. He or she also may have recommended tests. At home, you can help prevent illness with healthy eating, regular exercise, and other steps. Follow-up care is a key part of your treatment and safety. Be sure to make and go to all appointments, and call your doctor if you are having problems. It's also a good idea to know your test results and keep a list of the medicines you take. How can you care for yourself at home? · Reach and stay at a healthy weight. This will lower your risk for many problems, such as obesity, diabetes, heart disease, and high blood pressure. · Get at least 30 minutes of exercise on most days of the week. Walking is a good choice. You also may want to do other activities, such as running, swimming, cycling, or playing tennis or team sports. · Do not smoke. Smoking can make health problems worse. If you need help quitting, talk to your doctor about stop-smoking programs and medicines. These can increase your chances of quitting for good. · Protect your skin from too much sun. When you're outdoors from 10 a.m. to 4 p.m., stay in the shade or cover up with clothing and a hat with a wide brim. Wear sunglasses that block UV rays. Even when it's cloudy, put broad-spectrum sunscreen (SPF 30 or higher) on any exposed skin. · See a dentist one or two times a year for checkups and to have your teeth cleaned. · Wear a seat belt in the car. · Limit alcohol to 1 drink a day. Too much alcohol can cause health problems. Follow your doctor's advice about when to have certain tests. These tests can spot problems early. · Cholesterol.  Your doctor will tell you how often to have this done based on your age, family history, or other things that can increase your risk for heart attack and stroke. · Blood pressure. Have your blood pressure checked during a routine doctor visit. Your doctor will tell you how often to check your blood pressure based on your age, your blood pressure results, and other factors. · Mammogram. Ask your doctor how often you should have a mammogram, which is an X-ray of your breasts. A mammogram can spot breast cancer before it can be felt and when it is easiest to treat. · Pap test and pelvic exam. Ask your doctor how often you should have a Pap test. You may not need to have a Pap test as often as you used to. · Vision. Have your eyes checked every year or two or as often as your doctor suggests. Some experts recommend that you have yearly exams for glaucoma and other age-related eye problems starting at age 48. · Hearing. Tell your doctor if you notice any change in your hearing. You can have tests to find out how well you hear. · Diabetes. Ask your doctor whether you should have tests for diabetes. · Colon cancer. You should begin tests for colon cancer at age 48. You may have one of several tests. Your doctor will tell you how often to have tests based on your age and risk. Risks include whether you already had a precancerous polyp removed from your colon or whether your parents, sisters and brothers, or children have had colon cancer. · Thyroid disease. Talk to your doctor about whether to have your thyroid checked as part of a regular physical exam. Women have an increased chance of a thyroid problem. · Osteoporosis. You should begin tests for bone density at age 72. If you are younger than 72, ask your doctor whether you have factors that may increase your risk for this disease. You may want to have this test before age 72. · Heart attack and stroke risk. At least every 4 to 6 years, you should have your risk for heart attack and stroke assessed.  Your doctor uses factors such as your age, blood pressure, cholesterol, and whether you smoke or have diabetes to show what your risk for a heart attack or stroke is over the next 10 years. When should you call for help? Watch closely for changes in your health, and be sure to contact your doctor if you have any problems or symptoms that concern you. Where can you learn more? Go to https://chpepiceweb.healthNuView Systems. org and sign in to your SoccerFreakz account. Enter F088 in the ibeatyou box to learn more about \"Well Visit, Women 50 to 72: Care Instructions. \"     If you do not have an account, please click on the \"Sign Up Now\" link. Current as of: March 28, 2018  Content Version: 11.9  © 6267-5575 [employer], Incorporated. Care instructions adapted under license by [facility] ([facility]). If you have questions about a medical condition or this instruction, always ask your healthcare professional. [address] any warranty or liability for your use of this information.

## 2019-05-11 ENCOUNTER — EMPLOYEE WELLNESS (OUTPATIENT)
Dept: OTHER | Age: 64
End: 2019-05-11

## 2019-05-11 LAB
CHOLESTEROL, TOTAL: 144 MG/DL (ref 0–199)
FASTING: YES
GLUCOSE BLD-MCNC: 84 MG/DL (ref 74–109)
HDLC SERPL-MCNC: 66 MG/DL (ref 40–90)
LDL CHOLESTEROL CALCULATED: 69 MG/DL
TRIGL SERPL-MCNC: 45 MG/DL (ref 0–199)

## 2019-05-15 DIAGNOSIS — E55.9 VITAMIN D DEFICIENCY: ICD-10-CM

## 2019-05-15 RX ORDER — ERGOCALCIFEROL 1.25 MG/1
50000 CAPSULE ORAL
Qty: 24 CAPSULE | Refills: 3 | Status: SHIPPED | OUTPATIENT
Start: 2019-05-16 | End: 2020-04-13

## 2019-05-17 ENCOUNTER — HOSPITAL ENCOUNTER (OUTPATIENT)
Age: 64
Discharge: HOME | End: 2019-05-17
Payer: COMMERCIAL

## 2019-05-17 DIAGNOSIS — Z98.84 S/P BARIATRIC SURGERY: ICD-10-CM

## 2019-05-17 DIAGNOSIS — Z00.00 ANNUAL PHYSICAL EXAM: ICD-10-CM

## 2019-05-17 DIAGNOSIS — E55.9 VITAMIN D DEFICIENCY: ICD-10-CM

## 2019-05-17 DIAGNOSIS — E53.8 B12 DEFICIENCY: ICD-10-CM

## 2019-05-17 DIAGNOSIS — R60.0 LEG EDEMA: ICD-10-CM

## 2019-05-17 LAB
ALBUMIN SERPL-MCNC: 3.8 G/DL (ref 3.5–5.1)
ALP BLD-CCNC: 94 U/L (ref 38–126)
ALT SERPL-CCNC: 17 U/L (ref 11–66)
ANION GAP SERPL CALCULATED.3IONS-SCNC: 12 MEQ/L (ref 8–16)
AST SERPL-CCNC: 15 U/L (ref 5–40)
BASOPHILS # BLD: 0.5 %
BASOPHILS ABSOLUTE: 0 THOU/MM3 (ref 0–0.1)
BILIRUB SERPL-MCNC: 0.7 MG/DL (ref 0.3–1.2)
BUN BLDV-MCNC: 20 MG/DL (ref 7–22)
CALCIUM SERPL-MCNC: 8.9 MG/DL (ref 8.5–10.5)
CHLORIDE BLD-SCNC: 107 MEQ/L (ref 98–111)
CO2: 25 MEQ/L (ref 23–33)
CREAT SERPL-MCNC: 0.7 MG/DL (ref 0.4–1.2)
EOSINOPHIL # BLD: 1.8 %
EOSINOPHILS ABSOLUTE: 0.1 THOU/MM3 (ref 0–0.4)
ERYTHROCYTE [DISTWIDTH] IN BLOOD BY AUTOMATED COUNT: 13.5 % (ref 11.5–14.5)
ERYTHROCYTE [DISTWIDTH] IN BLOOD BY AUTOMATED COUNT: 48.7 FL (ref 35–45)
GFR SERPL CREATININE-BSD FRML MDRD: 85 ML/MIN/1.73M2
GLUCOSE BLD-MCNC: 81 MG/DL (ref 70–108)
HCT VFR BLD CALC: 40.4 % (ref 37–47)
HEMOGLOBIN: 12.9 GM/DL (ref 12–16)
IMMATURE GRANS (ABS): 0.02 THOU/MM3 (ref 0–0.07)
IMMATURE GRANULOCYTES: 0.4 %
LYMPHOCYTES # BLD: 31.7 %
LYMPHOCYTES ABSOLUTE: 1.8 THOU/MM3 (ref 1–4.8)
MCH RBC QN AUTO: 31.2 PG (ref 26–33)
MCHC RBC AUTO-ENTMCNC: 31.9 GM/DL (ref 32.2–35.5)
MCV RBC AUTO: 97.8 FL (ref 81–99)
MONOCYTES # BLD: 8.5 %
MONOCYTES ABSOLUTE: 0.5 THOU/MM3 (ref 0.4–1.3)
NUCLEATED RED BLOOD CELLS: 0 /100 WBC
PLATELET # BLD: 204 THOU/MM3 (ref 130–400)
PMV BLD AUTO: 9.9 FL (ref 9.4–12.4)
POTASSIUM SERPL-SCNC: 5.1 MEQ/L (ref 3.5–5.2)
RBC # BLD: 4.13 MILL/MM3 (ref 4.2–5.4)
SEG NEUTROPHILS: 57.1 %
SEGMENTED NEUTROPHILS ABSOLUTE COUNT: 3.3 THOU/MM3 (ref 1.8–7.7)
SODIUM BLD-SCNC: 144 MEQ/L (ref 135–145)
TOTAL PROTEIN: 6.5 G/DL (ref 6.1–8)
TSH SERPL DL<=0.05 MIU/L-ACNC: 3.37 UIU/ML (ref 0.4–4.2)
VITAMIN B-12: 350 PG/ML (ref 211–911)
VITAMIN D 25-HYDROXY: 34 NG/ML (ref 30–100)
WBC # BLD: 5.7 THOU/MM3 (ref 4.8–10.8)

## 2019-05-17 PROCEDURE — 84443 ASSAY THYROID STIM HORMONE: CPT

## 2019-05-17 PROCEDURE — 82306 VITAMIN D 25 HYDROXY: CPT

## 2019-05-17 PROCEDURE — 36415 COLL VENOUS BLD VENIPUNCTURE: CPT

## 2019-05-17 PROCEDURE — 85025 COMPLETE CBC W/AUTO DIFF WBC: CPT

## 2019-05-17 PROCEDURE — 82607 VITAMIN B-12: CPT

## 2019-05-17 PROCEDURE — 80053 COMPREHEN METABOLIC PANEL: CPT

## 2019-05-20 VITALS — BODY MASS INDEX: 41.36 KG/M2 | WEIGHT: 272 LBS

## 2019-06-19 ENCOUNTER — OFFICE VISIT (OUTPATIENT)
Dept: PHYSICAL MEDICINE AND REHAB | Age: 64
End: 2019-06-19
Payer: COMMERCIAL

## 2019-06-19 VITALS
HEART RATE: 78 BPM | DIASTOLIC BLOOD PRESSURE: 76 MMHG | SYSTOLIC BLOOD PRESSURE: 126 MMHG | HEIGHT: 68 IN | WEIGHT: 272.05 LBS | BODY MASS INDEX: 41.23 KG/M2

## 2019-06-19 DIAGNOSIS — G89.4 CHRONIC PAIN SYNDROME: ICD-10-CM

## 2019-06-19 DIAGNOSIS — M47.816 LUMBAR SPONDYLOSIS: Primary | ICD-10-CM

## 2019-06-19 DIAGNOSIS — M46.1 SACROILIAC INFLAMMATION (HCC): ICD-10-CM

## 2019-06-19 PROCEDURE — 99244 OFF/OP CNSLTJ NEW/EST MOD 40: CPT | Performed by: PAIN MEDICINE

## 2019-06-19 RX ORDER — TRAMADOL HYDROCHLORIDE 50 MG/1
100 TABLET ORAL EVERY 8 HOURS PRN
Qty: 90 TABLET | Refills: 0 | Status: SHIPPED | OUTPATIENT
Start: 2019-06-19 | End: 2019-07-11 | Stop reason: SDUPTHER

## 2019-06-19 ASSESSMENT — ENCOUNTER SYMPTOMS
SHORTNESS OF BREATH: 0
BOWEL INCONTINENCE: 0
CHEST TIGHTNESS: 0
COLOR CHANGE: 0
RHINORRHEA: 0
VOMITING: 0
SORE THROAT: 0
COUGH: 0
DIARRHEA: 0
NAUSEA: 0
CONSTIPATION: 0
SINUS PRESSURE: 0
BACK PAIN: 1
WHEEZING: 0
ABDOMINAL PAIN: 0
EYE PAIN: 0
PHOTOPHOBIA: 0

## 2019-06-19 NOTE — LETTER
[address] [address] [place] [address].  Phone: [phone]  Fax: [phone]    Adilene Hoover MD        June 25, 2019     James Vivar, [facility] [address] [address]    Patient: Shaylee Keys  MR Number: [id number]  YOB: 1955  Date of Visit: 6/19/2019    Dear Dr. James Vivar: Thank you for the request for consultation for Shaylee Keys to me for the evaluation of pain. Below are the relevant portions of my assessment and plan of care. If you have questions, please do not hesitate to call me. I look forward to following Brucei Laughter along with you.     Sincerely,        Adilene Hoover MD

## 2019-06-19 NOTE — PROGRESS NOTES
[address] [facility]  [address] [facility] 56.  Dept: [phone]  Dept Fax: [phone]: [phone]    Visit Date: 9/08/5091    Roxanne Maldonado is a 62 y.o. female who is referred for pain management evaluation and treatment per Dr. Thomas Batista. CAGE and CAGE-AID Questions   1. In the last three months, have you felt you should cut down or stop drinking or using drugs? Yes []        No [x]     2. In the last three months, has anyone annoyed you or gotten on your nerves by telling you to cut down or stop drinking or using drugs? Yes []        No [x]     3. In the last three months, have you felt guilty or bad about how much you drink or use drugs? Yes []        No [x]     4. In the last three months, have you been waking up wanting to have an alcoholic drink or use drugs? Yes []        No [x]        Opioid Risk Tool:  Clinician Form       1. Family History of Substance Abuse: Female Male    Alcohol   []1   []3    Illegal drugs   []2   []3    Prescription drugs     []4   []4   2. Personal History of Substance Abuse:          Alcohol   []3   []3    Illegal drugs   []4   []4    Prescription drugs     []5   []5   3. Age (isabel box if between 12 and 39):     []1   []1   4. History of Preadolescent Sexual Abuse:     []3   []0   5. Psychological Disease:      Attention deficit disorder, obsessive-compulsive disorder, bipolar, schizophrenia   []2   []2      Depression     []1   []1    Scoring Totals       Total Score  Low Risk  Moderate Risk  High Risk   Risk Category   0 - 3   4 - 7   8 or Above      Patient states symptoms interfere with:  A.  General Activity:  yes   B. Mood: yes    C. Walking Ability:   yes   D. Normal Work (Includes both work outside the home and housework):   yes    E.  Relations with Other People:  no   F. Sleep:   yes   G.  Enjoyment of Life:  yes       HPI:     ChiefComplaint: thoracic canal. Thoracic foramina are grossly patent.       There is slight bulging disc material at L1-2. There is no significant stenosis of the canal or foramina resulting. There are mild facet degeneration bilaterally.       Disc osteophyte complex extends concentrically at L2-3. There is a right-sided prominence also. There are mild facet degenerative changes and thickened ligaments bilaterally. The central canal is only minimally narrowed. The left foramen is mildly    narrowed but there is moderate right foraminal stenosis.       At L3-4, there are mild facet degenerative changes bilaterally. Very minimal bulging disc material also. The central canal is only slightly narrowed. There is mild right more than left subarticular stenosis. There is bilateral mild foraminal stenosis,    right more than left also.       At L4-5, the central canal is uncompromised. Ankylosis of the facets suggested bilaterally, poorly defined. No evidence of focal disc abnormality. There is mild foraminal stenosis bilaterally.       At L5-S1, the central canal is grossly patent but minimally indented secondary to slight bulging disc and minimal endplate osteophytes and the aforementioned retrolisthesis. Moderate facet degeneration evident bilaterally. There is moderate severe left    and mild right foraminal stenosis.       Surrounding tissues show no concerning characteristics. Likely parapelvic cysts of the left kidney.           Impression   IMPRESSION: DEGENERATIVE DISC AND FACET CHANGES AT THE L2-3 AND L3-4 LEVELS RESULTING IN RIGHT FORAMINAL AND LATERAL CANAL STENOSIS, POSSIBLY RELATED TO THE STATED COMPLAINTS. THERE IS ACTUALLY MORE IMPRESSIVE LEFT FORAMINAL STENOSIS AT THE L5-S1 LEVEL    HOWEVER, MODERATE SEVERE.             The patient is allergic to penicillins. Fly Aguirre  has a past medical history of Chronic back pain, Osteopenia, S/P colonoscopy with polypectomy, and Vitamin D deficiency.     Past throat. Eyes: Negative for photophobia, pain and visual disturbance. Respiratory: Negative for cough, chest tightness, shortness of breath and wheezing. Cardiovascular: Negative for chest pain and palpitations. Gastrointestinal: Negative for abdominal pain, bowel incontinence, constipation, diarrhea, nausea and vomiting. Endocrine: Negative for cold intolerance, heat intolerance, polydipsia, polyphagia and polyuria. Genitourinary: Negative for bladder incontinence, decreased urine volume, difficulty urinating, frequency and hematuria. Musculoskeletal: Positive for arthralgias, back pain and gait problem. Negative for joint swelling, myalgias, neck pain and neck stiffness. Skin: Negative for color change and rash. Allergic/Immunologic: Negative for food allergies and immunocompromised state. Neurological: Negative for dizziness, tingling, tremors, seizures, syncope, facial asymmetry, speech difficulty, weakness, light-headedness, numbness and headaches. Hematological: Does not bruise/bleed easily. Psychiatric/Behavioral: Positive for sleep disturbance. Negative for agitation, behavioral problems, confusion, decreased concentration, dysphoric mood, hallucinations, self-injury and suicidal ideas. The patient is not nervous/anxious and is not hyperactive. Objective:     Vitals:    06/19/19 0912   BP: 126/76   Site: Left Upper Arm   Position: Sitting   Cuff Size: Medium Adult   Pulse: 78   Weight: 272 lb 0.8 oz (123.4 kg)   Height: 5' 7.99\" (1.727 m)       Physical Exam   Constitutional: She is oriented to person, place, and time. She appears well-developed and well-nourished. No distress. HENT:   Head: Normocephalic and atraumatic. Right Ear: External ear normal.   Left Ear: External ear normal.   Nose: Nose normal.   Mouth/Throat: Oropharynx is clear and moist. No oropharyngeal exudate. Eyes: Pupils are equal, round, and reactive to light.  Conjunctivae and EOM are normal. Right eye exhibits no discharge. Left eye exhibits no discharge. No scleral icterus. Neck: Normal range of motion and full passive range of motion without pain. Neck supple. No muscular tenderness present. No neck rigidity. No edema, no erythema and normal range of motion present. No thyromegaly present. Cardiovascular: Normal rate, regular rhythm, normal heart sounds and intact distal pulses. Exam reveals no gallop and no friction rub. No murmur heard. Pulmonary/Chest: Effort normal and breath sounds normal. No respiratory distress. She has no wheezes. She has no rales. She exhibits no tenderness. Abdominal: Soft. Bowel sounds are normal. She exhibits no distension. There is no tenderness. There is no rebound and no guarding. Musculoskeletal:        Right shoulder: She exhibits normal range of motion and no tenderness. Left shoulder: She exhibits normal range of motion and no tenderness. Right hip: She exhibits decreased range of motion and tenderness. Left hip: She exhibits no tenderness. Right knee: She exhibits normal range of motion and no swelling. No tenderness found. Left knee: She exhibits normal range of motion and no swelling. No tenderness found. Right ankle: She exhibits normal range of motion and no swelling. Left ankle: She exhibits swelling. She exhibits normal range of motion. Cervical back: She exhibits normal range of motion and no tenderness. Thoracic back: She exhibits no tenderness. Lumbar back: She exhibits decreased range of motion, tenderness and pain. Back:         Right lower leg: She exhibits no swelling. Left lower leg: She exhibits swelling. Right foot: There is no swelling. Left foot: There is swelling. Neurological: She is alert and oriented to person, place, and time. She has normal strength and normal reflexes. She is not disoriented. She displays no atrophy.  No cranial nerve deficit or sensory deficit. She exhibits normal muscle tone. She displays a negative Romberg sign. Gait abnormal. Coordination normal. She displays no Babinski's sign on the right side. She displays no Babinski's sign on the left side. slr neg   Skin: Skin is warm. No rash noted. She is not diaphoretic. No erythema. No pallor. Psychiatric: She has a normal mood and affect. Her speech is normal and behavior is normal. Judgment and thought content normal. Her mood appears not anxious. Her affect is not angry, not blunt, not labile and not inappropriate. She is not agitated, not aggressive, not hyperactive, not slowed, not withdrawn, not actively hallucinating and not combative. Thought content is not paranoid and not delusional. Cognition and memory are normal. Cognition and memory are not impaired. She does not express impulsivity or inappropriate judgment. She does not exhibit a depressed mood. She expresses no homicidal and no suicidal ideation. She expresses no suicidal plans and no homicidal plans. She exhibits normal recent memory and normal remote memory. She is attentive. Nursing note and vitals reviewed. HALEY test: POS  Yeoman's test: POS  Gaenslen test: POS     Assessment:     1. Lumbar spondylosis    2. Sacroiliac inflammation ([facility] [address]    3. Chronic pain syndrome            Plan:      · Patient read and signed orientation and opioid agreement. · OARRS reviewed. Current MED: 30  · Patient was not offered naloxone for home. · Discussed long term side effects of medications, tolerance, dependency and addiction. · UDS preformed today. · Patient told can not receive any pain medications from any other source. · No evidence of abuse, diversion or aberrant behavior. · Prescription Needs:  Will prescribe patient a short length of pain medications in good chelle until UDS come back   Medications and/or procedures to improve function and quality of life- patient understanding with this and that may not be pain free   Discussed possible weaning of medication dosing dependent on treatment/procedure results.  Discussed with patient about safe storage of medications at home   Testing: Reviewed MRI LUMBAR SPINE with patient.  Procedures: Bilateral L-facet MBB @ L3-4,L4-5 and L5-S1   Discussed with patient about risks with procedure including infection, reaction to medication, increased pain, or bleeding.  Medications:  Discussed at length. Side effects reviewed. Previous Treatments tried:  · PT: Yes,  any benefit? No, how many weeks? 6, last date done: yrs ago  · NSAIDs: Yes,  any benefit? Yes,  how long taken: takes  · Chiropractic: Yes,  any benefit? No  · Muscle relaxants: No,  any benefit? No  · Narcotics: Yes,  any benefit? No  · Spine surgeon consult: Yes  · Any Implants: Yes    Meds. Prescribed:   Orders Placed This Encounter   Medications    traMADol (ULTRAM) 50 MG tablet     Sig: Take 2 tablets by mouth every 8 hours as needed for Pain for up to 15 days. Dispense:  90 tablet     Refill:  0     Reduce doses taken as pain becomes manageable       Return for L-facet MBB @ L3-4,L4-5 and L5-S1 . Time spent with patient was 60 minutes more than 50% was spent  Counseling/coordinated the patient'scare.     Electronically signed by Isaiah Castorena MD on 6/19/2019 at 10:26 AM

## 2019-07-11 ENCOUNTER — PREP FOR PROCEDURE (OUTPATIENT)
Dept: PHYSICAL MEDICINE AND REHAB | Age: 64
End: 2019-07-11

## 2019-07-11 DIAGNOSIS — M47.816 LUMBAR SPONDYLOSIS: ICD-10-CM

## 2019-07-11 DIAGNOSIS — M46.1 SACROILIAC INFLAMMATION (HCC): ICD-10-CM

## 2019-07-11 DIAGNOSIS — G89.4 CHRONIC PAIN SYNDROME: ICD-10-CM

## 2019-07-11 RX ORDER — TRAMADOL HYDROCHLORIDE 50 MG/1
50 TABLET ORAL EVERY 8 HOURS PRN
Qty: 90 TABLET | Refills: 0 | Status: SHIPPED | OUTPATIENT
Start: 2019-07-11 | End: 2019-08-15 | Stop reason: SDUPTHER

## 2019-07-11 RX ORDER — TRAMADOL HYDROCHLORIDE 50 MG/1
50 TABLET ORAL EVERY 8 HOURS PRN
Qty: 90 TABLET | Refills: 0 | OUTPATIENT
Start: 2019-07-11 | End: 2019-08-10

## 2019-07-22 ENCOUNTER — ANESTHESIA EVENT (OUTPATIENT)
Dept: OPERATING ROOM | Age: 64
End: 2019-07-22
Payer: COMMERCIAL

## 2019-07-22 ENCOUNTER — ANESTHESIA (OUTPATIENT)
Dept: OPERATING ROOM | Age: 64
End: 2019-07-22
Payer: COMMERCIAL

## 2019-07-22 ENCOUNTER — HOSPITAL ENCOUNTER (OUTPATIENT)
Age: 64
Setting detail: OUTPATIENT SURGERY
Discharge: HOME | End: 2019-07-22
Attending: PAIN MEDICINE | Admitting: PAIN MEDICINE
Payer: COMMERCIAL

## 2019-07-22 ENCOUNTER — APPOINTMENT (OUTPATIENT)
Dept: GENERAL RADIOLOGY | Age: 64
End: 2019-07-22
Attending: PAIN MEDICINE
Payer: COMMERCIAL

## 2019-07-22 VITALS
OXYGEN SATURATION: 97 % | RESPIRATION RATE: 10 BRPM | SYSTOLIC BLOOD PRESSURE: 104 MMHG | DIASTOLIC BLOOD PRESSURE: 53 MMHG

## 2019-07-22 VITALS
BODY MASS INDEX: 40.59 KG/M2 | RESPIRATION RATE: 16 BRPM | OXYGEN SATURATION: 94 % | WEIGHT: 267.8 LBS | HEART RATE: 61 BPM | HEIGHT: 68 IN | TEMPERATURE: 97.4 F | DIASTOLIC BLOOD PRESSURE: 57 MMHG | SYSTOLIC BLOOD PRESSURE: 108 MMHG

## 2019-07-22 PROCEDURE — 64495 INJ PARAVERT F JNT L/S 3 LEV: CPT | Performed by: PAIN MEDICINE

## 2019-07-22 PROCEDURE — 7100000010 HC PHASE II RECOVERY - FIRST 15 MIN: Performed by: PAIN MEDICINE

## 2019-07-22 PROCEDURE — 64494 INJ PARAVERT F JNT L/S 2 LEV: CPT | Performed by: PAIN MEDICINE

## 2019-07-22 PROCEDURE — 2500000003 HC RX 250 WO HCPCS: Performed by: NURSE ANESTHETIST, CERTIFIED REGISTERED

## 2019-07-22 PROCEDURE — 64493 INJ PARAVERT F JNT L/S 1 LEV: CPT | Performed by: PAIN MEDICINE

## 2019-07-22 PROCEDURE — 3700000000 HC ANESTHESIA ATTENDED CARE: Performed by: PAIN MEDICINE

## 2019-07-22 PROCEDURE — 3600000054 HC PAIN LEVEL 3 BASE: Performed by: PAIN MEDICINE

## 2019-07-22 PROCEDURE — 6360000002 HC RX W HCPCS: Performed by: PAIN MEDICINE

## 2019-07-22 PROCEDURE — 2500000003 HC RX 250 WO HCPCS: Performed by: PAIN MEDICINE

## 2019-07-22 PROCEDURE — 6360000002 HC RX W HCPCS: Performed by: NURSE ANESTHETIST, CERTIFIED REGISTERED

## 2019-07-22 PROCEDURE — 3209999900 FLUORO FOR SURGICAL PROCEDURES

## 2019-07-22 PROCEDURE — 7100000011 HC PHASE II RECOVERY - ADDTL 15 MIN: Performed by: PAIN MEDICINE

## 2019-07-22 RX ORDER — PROPOFOL 10 MG/ML
INJECTION, EMULSION INTRAVENOUS PRN
Status: DISCONTINUED | OUTPATIENT
Start: 2019-07-22 | End: 2019-07-22 | Stop reason: SDUPTHER

## 2019-07-22 RX ORDER — LIDOCAINE HYDROCHLORIDE 10 MG/ML
INJECTION, SOLUTION INFILTRATION; PERINEURAL PRN
Status: DISCONTINUED | OUTPATIENT
Start: 2019-07-22 | End: 2019-07-22 | Stop reason: ALTCHOICE

## 2019-07-22 RX ORDER — LIDOCAINE HYDROCHLORIDE 10 MG/ML
INJECTION, SOLUTION INFILTRATION; PERINEURAL PRN
Status: DISCONTINUED | OUTPATIENT
Start: 2019-07-22 | End: 2019-07-22 | Stop reason: SDUPTHER

## 2019-07-22 RX ORDER — ROPIVACAINE HYDROCHLORIDE 2 MG/ML
INJECTION, SOLUTION EPIDURAL; INFILTRATION; PERINEURAL PRN
Status: DISCONTINUED | OUTPATIENT
Start: 2019-07-22 | End: 2019-07-22 | Stop reason: ALTCHOICE

## 2019-07-22 RX ORDER — METHYLPREDNISOLONE ACETATE 80 MG/ML
INJECTION, SUSPENSION INTRA-ARTICULAR; INTRALESIONAL; INTRAMUSCULAR; SOFT TISSUE PRN
Status: DISCONTINUED | OUTPATIENT
Start: 2019-07-22 | End: 2019-07-22 | Stop reason: ALTCHOICE

## 2019-07-22 RX ADMIN — PROPOFOL 50 MG: 10 INJECTION, EMULSION INTRAVENOUS at 11:44

## 2019-07-22 RX ADMIN — LIDOCAINE HYDROCHLORIDE 20 MG: 10 INJECTION, SOLUTION INFILTRATION; PERINEURAL at 11:44

## 2019-07-22 RX ADMIN — PROPOFOL 50 MG: 10 INJECTION, EMULSION INTRAVENOUS at 11:49

## 2019-07-22 RX ADMIN — PROPOFOL 50 MG: 10 INJECTION, EMULSION INTRAVENOUS at 11:51

## 2019-07-22 RX ADMIN — PROPOFOL 50 MG: 10 INJECTION, EMULSION INTRAVENOUS at 11:46

## 2019-07-22 ASSESSMENT — PULMONARY FUNCTION TESTS
PIF_VALUE: 0

## 2019-07-22 ASSESSMENT — PAIN SCALES - GENERAL: PAINLEVEL_OUTOF10: 0

## 2019-07-22 ASSESSMENT — PAIN - FUNCTIONAL ASSESSMENT: PAIN_FUNCTIONAL_ASSESSMENT: 0-10

## 2019-07-22 ASSESSMENT — PAIN DESCRIPTION - DESCRIPTORS: DESCRIPTORS: ACHING
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
## 2019-07-23 ENCOUNTER — TELEPHONE (OUTPATIENT)
Dept: FAMILY MEDICINE CLINIC | Age: 64
End: 2019-07-23

## 2019-08-13 ENCOUNTER — OFFICE VISIT (OUTPATIENT)
Dept: FAMILY MEDICINE CLINIC | Age: 64
End: 2019-08-13
Payer: COMMERCIAL

## 2019-08-13 VITALS
DIASTOLIC BLOOD PRESSURE: 84 MMHG | BODY MASS INDEX: 41.24 KG/M2 | HEART RATE: 70 BPM | SYSTOLIC BLOOD PRESSURE: 136 MMHG | RESPIRATION RATE: 16 BRPM | WEIGHT: 271.2 LBS | TEMPERATURE: 97.9 F

## 2019-08-13 DIAGNOSIS — M25.551 RIGHT HIP PAIN: Primary | ICD-10-CM

## 2019-08-13 DIAGNOSIS — E66.01 MORBID OBESITY WITH BMI OF 40.0-44.9, ADULT (HCC): ICD-10-CM

## 2019-08-13 DIAGNOSIS — M47.816 LUMBAR SPONDYLOSIS: ICD-10-CM

## 2019-08-13 PROCEDURE — 99213 OFFICE O/P EST LOW 20 MIN: CPT | Performed by: FAMILY MEDICINE

## 2019-08-15 ENCOUNTER — HOSPITAL ENCOUNTER (OUTPATIENT)
Age: 64
Discharge: HOME | End: 2019-08-15
Payer: COMMERCIAL

## 2019-08-15 ENCOUNTER — HOSPITAL ENCOUNTER (OUTPATIENT)
Dept: GENERAL RADIOLOGY | Age: 64
Discharge: HOME | End: 2019-08-15
Payer: COMMERCIAL

## 2019-08-15 ENCOUNTER — OFFICE VISIT (OUTPATIENT)
Dept: PHYSICAL MEDICINE AND REHAB | Age: 64
End: 2019-08-15
Payer: COMMERCIAL

## 2019-08-15 VITALS
DIASTOLIC BLOOD PRESSURE: 78 MMHG | HEART RATE: 74 BPM | BODY MASS INDEX: 41.1 KG/M2 | WEIGHT: 271.17 LBS | SYSTOLIC BLOOD PRESSURE: 128 MMHG | HEIGHT: 68 IN

## 2019-08-15 DIAGNOSIS — M46.1 SACROILIAC INFLAMMATION (HCC): Primary | ICD-10-CM

## 2019-08-15 DIAGNOSIS — M46.1 SACROILIAC INFLAMMATION (HCC): ICD-10-CM

## 2019-08-15 DIAGNOSIS — G89.4 CHRONIC PAIN SYNDROME: ICD-10-CM

## 2019-08-15 DIAGNOSIS — M53.3 SI (SACROILIAC) PAIN: ICD-10-CM

## 2019-08-15 DIAGNOSIS — M47.816 LUMBAR SPONDYLOSIS: ICD-10-CM

## 2019-08-15 DIAGNOSIS — M54.17 LUMBOSACRAL RADICULITIS: ICD-10-CM

## 2019-08-15 PROCEDURE — 99213 OFFICE O/P EST LOW 20 MIN: CPT | Performed by: NURSE PRACTITIONER

## 2019-08-15 PROCEDURE — 72100 X-RAY EXAM L-S SPINE 2/3 VWS: CPT

## 2019-08-15 RX ORDER — TRAMADOL HYDROCHLORIDE 50 MG/1
50 TABLET ORAL EVERY 8 HOURS PRN
Qty: 90 TABLET | Refills: 0 | Status: SHIPPED | OUTPATIENT
Start: 2019-08-15 | End: 2019-09-16 | Stop reason: SDUPTHER

## 2019-08-15 ASSESSMENT — ENCOUNTER SYMPTOMS
GASTROINTESTINAL NEGATIVE: 1
BACK PAIN: 1
BACK PAIN: 1
RESPIRATORY NEGATIVE: 1

## 2019-08-15 NOTE — PROGRESS NOTES
tablet by mouth daily, Disp: , Rfl:     Acetaminophen (TYLENOL PO), Take  by mouth as needed. , Disp: , Rfl:     UNABLE TO FIND, Bariatric Advantage Complete Multi-Formula Vitamin with Vitamin D and B Complex 6 PO QD, Disp: , Rfl:     Subjective:      Review of Systems   Musculoskeletal: Positive for arthralgias, back pain, gait problem, joint swelling and myalgias. Neurological: Positive for weakness. Negative for numbness. Objective:     Vitals:    08/15/19 1415   BP: 128/78   Site: Left Lower Arm   Position: Sitting   Cuff Size: Medium Adult   Pulse: 74   Weight: 271 lb 2.7 oz (123 kg)   Height: 5' 8\" (1.727 m)       Physical Exam   Constitutional: She is oriented to person, place, and time. She appears well-developed and well-nourished. No distress. HENT:   Head: Normocephalic and atraumatic. Right Ear: External ear normal.   Left Ear: External ear normal.   Nose: Nose normal.   Mouth/Throat: Oropharynx is clear and moist. No oropharyngeal exudate. Eyes: Pupils are equal, round, and reactive to light. Conjunctivae and EOM are normal. Right eye exhibits no discharge. Left eye exhibits no discharge. No scleral icterus. Neck: Normal range of motion and full passive range of motion without pain. Neck supple. No muscular tenderness present. No neck rigidity. No edema, no erythema and normal range of motion present. No thyromegaly present. Cardiovascular: Normal rate, regular rhythm, normal heart sounds and intact distal pulses. Exam reveals no gallop and no friction rub. No murmur heard. Pulmonary/Chest: Effort normal and breath sounds normal. No respiratory distress. She has no wheezes. She has no rales. She exhibits no tenderness. Abdominal: Soft. Bowel sounds are normal. She exhibits no distension. There is no tenderness. There is no rebound and no guarding. Musculoskeletal:        Right shoulder: She exhibits normal range of motion and no tenderness.         Left shoulder: She exhibits

## 2019-08-30 ENCOUNTER — HOSPITAL ENCOUNTER (EMERGENCY)
Age: 64
Discharge: HOME | End: 2019-08-30
Payer: COMMERCIAL

## 2019-08-30 ENCOUNTER — APPOINTMENT (OUTPATIENT)
Dept: CT IMAGING | Age: 64
End: 2019-08-30
Payer: COMMERCIAL

## 2019-08-30 VITALS
TEMPERATURE: 98.5 F | RESPIRATION RATE: 16 BRPM | SYSTOLIC BLOOD PRESSURE: 106 MMHG | HEIGHT: 68 IN | DIASTOLIC BLOOD PRESSURE: 85 MMHG | BODY MASS INDEX: 40.77 KG/M2 | HEART RATE: 79 BPM | WEIGHT: 269 LBS | OXYGEN SATURATION: 96 %

## 2019-08-30 DIAGNOSIS — M25.551 RIGHT HIP PAIN: Primary | ICD-10-CM

## 2019-08-30 PROCEDURE — 6370000000 HC RX 637 (ALT 250 FOR IP): Performed by: NURSE PRACTITIONER

## 2019-08-30 PROCEDURE — 96372 THER/PROPH/DIAG INJ SC/IM: CPT

## 2019-08-30 PROCEDURE — 72131 CT LUMBAR SPINE W/O DYE: CPT

## 2019-08-30 PROCEDURE — 99283 EMERGENCY DEPT VISIT LOW MDM: CPT

## 2019-08-30 PROCEDURE — 73700 CT LOWER EXTREMITY W/O DYE: CPT

## 2019-08-30 PROCEDURE — 6360000002 HC RX W HCPCS: Performed by: NURSE PRACTITIONER

## 2019-08-30 RX ORDER — LIDOCAINE 50 MG/G
OINTMENT TOPICAL
Qty: 1 TUBE | Refills: 1 | Status: ON HOLD | OUTPATIENT
Start: 2019-08-30 | End: 2019-12-13

## 2019-08-30 RX ORDER — CYCLOBENZAPRINE HCL 10 MG
10 TABLET ORAL 3 TIMES DAILY PRN
Qty: 12 TABLET | Refills: 0 | Status: SHIPPED | OUTPATIENT
Start: 2019-08-30 | End: 2019-09-09

## 2019-08-30 RX ORDER — DIAZEPAM 5 MG/1
5 TABLET ORAL ONCE
Status: COMPLETED | OUTPATIENT
Start: 2019-08-30 | End: 2019-08-30

## 2019-08-30 RX ORDER — KETOROLAC TROMETHAMINE 30 MG/ML
30 INJECTION, SOLUTION INTRAMUSCULAR; INTRAVENOUS ONCE
Status: COMPLETED | OUTPATIENT
Start: 2019-08-30 | End: 2019-08-30

## 2019-08-30 RX ADMIN — KETOROLAC TROMETHAMINE 30 MG: 30 INJECTION, SOLUTION INTRAMUSCULAR at 19:01

## 2019-08-30 RX ADMIN — DIAZEPAM 5 MG: 5 TABLET ORAL at 19:01

## 2019-08-30 RX ADMIN — HYDROMORPHONE HYDROCHLORIDE 0.5 MG: 1 INJECTION, SOLUTION INTRAMUSCULAR; INTRAVENOUS; SUBCUTANEOUS at 20:02

## 2019-08-30 ASSESSMENT — PAIN DESCRIPTION - LOCATION: LOCATION: HIP

## 2019-08-30 ASSESSMENT — ENCOUNTER SYMPTOMS
COLOR CHANGE: 0
BACK PAIN: 1
ABDOMINAL PAIN: 0

## 2019-08-30 ASSESSMENT — PAIN DESCRIPTION - FREQUENCY: FREQUENCY: INTERMITTENT

## 2019-08-30 ASSESSMENT — PAIN DESCRIPTION - ORIENTATION: ORIENTATION: RIGHT

## 2019-08-30 ASSESSMENT — PAIN DESCRIPTION - PAIN TYPE: TYPE: ACUTE PAIN

## 2019-08-30 ASSESSMENT — PAIN SCALES - GENERAL
PAINLEVEL_OUTOF10: 7
PAINLEVEL_OUTOF10: 7

## 2019-08-30 ASSESSMENT — PAIN DESCRIPTION - DESCRIPTORS: DESCRIPTORS: STABBING

## 2019-08-30 NOTE — ED PROVIDER NOTES
indicated that the status of her sister is unknown. She indicated that her maternal grandmother is . She indicated that her maternal grandfather is . She indicated that her paternal grandmother is . She indicated that her paternal grandfather is . She indicated that the status of her paternal uncle is unknown. She indicated that the status of her neg hx is unknown.   family history includes Cancer in her sister; Diabetes in her mother and sister; Heart Disease in her father and paternal uncle; High Blood Pressure in her father, mother, and sister; Osteoporosis in her mother; Stroke in her mother. SOCIAL HISTORY      reports that she has never smoked. She has never used smokeless tobacco. She reports that she does not drink alcohol or use drugs. PHYSICAL EXAM     INITIAL VITALS:  height is 5' 8\" (1.727 m) and weight is 269 lb (122 kg). Her oral temperature is 98.5 °F (36.9 °C). Her blood pressure is 106/85 and her pulse is 79. Her respiration is 16 and oxygen saturation is 96%. Physical Exam   Constitutional: She is oriented to person, place, and time. She appears well-developed and well-nourished. HENT:   Head: Normocephalic and atraumatic. Eyes: EOM are normal.   Neck: Normal range of motion. Pulmonary/Chest: Effort normal. No respiratory distress. Musculoskeletal: She exhibits tenderness. She exhibits no deformity. Tenderness of the right SI joint and tenderness around S1. Right leg is slightly weaker than the left. Sensation is intact. Neurological: She is alert and oriented to person, place, and time. Skin: Skin is warm and dry. Nursing note and vitals reviewed.         DIFFERENTIAL DIAGNOSIS:   Including but not limited to DDD, sacroiliitis, occult fracture     DIAGNOSTIC RESULTS     RADIOLOGY: non-plain film images(s) such as CT, Ultrasound and MRI are read by the radiologist.  Plain radiographic images are visualized and preliminarily interpreted by the managing her symptoms at home. Patient was discharged home in stable condition. She was provided with prescription for Flexeril and lidocaine ointment. Patient was instructed to follow up with her PCP and Dr. Roselia Dale for further management of her symptoms. The patient was amenable with all discharge and follow up instructions. All questions were addressed and answered. Return precautions were given for new or worsening symptoms. Strict return precautions and follow up instructions were discussed with the patient with which the patient agrees     Physical assessment findings, diagnostic testing(s) if applicable, and vital signs reviewed with patient/patient representative. Questions answered. Medications asdirected, including OTC medications for supportive care. Education provided on medications. Differential diagnosis(s) discussed with patient/patient representative. Home care/self care instructions reviewed withpatient/patient representative. Patient is to follow-up with family care provider in 2-3 days if no improvement. Patient is to go to the emergency department if symptoms worsen. Patient/patient representative isaware of care plan, questions answered, verbalizes understanding and is in agreement. ED Medications administered this visit:   Medications   diazepam (VALIUM) tablet 5 mg (5 mg Oral Given 8/30/19 1901)   ketorolac (TORADOL) injection 30 mg (30 mg Intramuscular Given 8/30/19 1901)   HYDROmorphone (DILAUDID) injection 0.5 mg (0.5 mg Intramuscular Given 8/30/19 2002)           I have given the patient strict written and verbal instructions about care at home,follow-up, and signs and symptoms of worsening of condition and they did verbalize understanding. Patient was seen independently by myself. The Patient's final impression and disposition and plan was determined by myself.        CRITICAL CARE:   None     CONSULTS:  None    PROCEDURES:  None    FINAL IMPRESSION      1. Right hip pain          DISPOSITION/PLAN   DISPOSITION Decision To Discharge 08/30/2019 08:56:10 PM           PATIENT REFERRED TO:  Mayte Medellin MD  [address]  [phone]    Schedule an appointment as soon as possible for a visit in 2 days  For follow up    Stacey Mccarty MD  [address] [facility]  [phone]    Schedule an appointment as soon as possible for a visit in 2 days  For follow up      [address]:  Discharge Medication List as of 8/30/2019  8:58 PM      START taking these medications    Details   cyclobenzaprine (FLEXERIL) 10 MG tablet Take 1 tablet by mouth 3 times daily as needed for Muscle spasms, Disp-12 tablet, R-0Print      lidocaine (XYLOCAINE) 5 % ointment Apply topically as needed. , Disp-1 Tube, R-1, Print             (Please note that portions of this note were completed with a voice recognition program.  Efforts were made to edit thedictations but occasionally words are mis-transcribed.)    PATEL Roy - CNP    Scribe:  Shmuel Goodman 8/30/19 6:09 PM Scribing for and in the presence of Sunny Artis CNP    Signed by: Jan Cosme, 09/02/19 9:15 PM    Provider:  I personally performed the services described in the documentation, reviewed and edited the documentation which was dictated to the scribe in my presence, and it accurately records my words and actions.     Sunny Artis CNP 8/30/19 9:15 PM              PATEL Roy CNP  09/02/19 [id number]

## 2019-09-03 ENCOUNTER — CARE COORDINATION (OUTPATIENT)
Dept: CASE MANAGEMENT | Age: 64
End: 2019-09-03

## 2019-09-05 NOTE — H&P (VIEW-ONLY)
right side   Assessment:      1. Sacroiliac inflammation (HCC)    2. SI (sacroiliac) pain    3. Lumbosacral radiculitis    4. Lumbar spondylosis    5.  Chronic pain syndrome         Review of Systems    Physical Exam    Plan:   right SI ELLE # [id number] [facility], APRN - CNP  9/5/2019

## 2019-09-12 ENCOUNTER — ANESTHESIA EVENT (OUTPATIENT)
Dept: OPERATING ROOM | Age: 64
End: 2019-09-12
Payer: COMMERCIAL

## 2019-09-12 ENCOUNTER — HOSPITAL ENCOUNTER (OUTPATIENT)
Age: 64
Setting detail: OUTPATIENT SURGERY
Discharge: HOME | End: 2019-09-12
Attending: PAIN MEDICINE | Admitting: PAIN MEDICINE
Payer: COMMERCIAL

## 2019-09-12 ENCOUNTER — ANESTHESIA (OUTPATIENT)
Dept: OPERATING ROOM | Age: 64
End: 2019-09-12
Payer: COMMERCIAL

## 2019-09-12 ENCOUNTER — APPOINTMENT (OUTPATIENT)
Dept: GENERAL RADIOLOGY | Age: 64
End: 2019-09-12
Attending: PAIN MEDICINE
Payer: COMMERCIAL

## 2019-09-12 VITALS
RESPIRATION RATE: 16 BRPM | HEIGHT: 68 IN | DIASTOLIC BLOOD PRESSURE: 64 MMHG | WEIGHT: 269 LBS | BODY MASS INDEX: 40.77 KG/M2 | OXYGEN SATURATION: 98 % | SYSTOLIC BLOOD PRESSURE: 113 MMHG | HEART RATE: 64 BPM | TEMPERATURE: 97.5 F

## 2019-09-12 VITALS
DIASTOLIC BLOOD PRESSURE: 68 MMHG | OXYGEN SATURATION: 100 % | SYSTOLIC BLOOD PRESSURE: 127 MMHG | RESPIRATION RATE: 18 BRPM

## 2019-09-12 PROCEDURE — 3700000000 HC ANESTHESIA ATTENDED CARE: Performed by: PAIN MEDICINE

## 2019-09-12 PROCEDURE — 2500000003 HC RX 250 WO HCPCS: Performed by: PAIN MEDICINE

## 2019-09-12 PROCEDURE — 2709999900 HC NON-CHARGEABLE SUPPLY: Performed by: PAIN MEDICINE

## 2019-09-12 PROCEDURE — 3600000054 HC PAIN LEVEL 3 BASE: Performed by: PAIN MEDICINE

## 2019-09-12 PROCEDURE — 6360000002 HC RX W HCPCS: Performed by: SPECIALIST

## 2019-09-12 PROCEDURE — 2500000003 HC RX 250 WO HCPCS: Performed by: SPECIALIST

## 2019-09-12 PROCEDURE — 2580000003 HC RX 258: Performed by: SPECIALIST

## 2019-09-12 PROCEDURE — 7100000010 HC PHASE II RECOVERY - FIRST 15 MIN: Performed by: PAIN MEDICINE

## 2019-09-12 PROCEDURE — 3209999900 FLUORO FOR SURGICAL PROCEDURES

## 2019-09-12 PROCEDURE — 7100000011 HC PHASE II RECOVERY - ADDTL 15 MIN: Performed by: PAIN MEDICINE

## 2019-09-12 PROCEDURE — 6360000004 HC RX CONTRAST MEDICATION: Performed by: PAIN MEDICINE

## 2019-09-12 PROCEDURE — 6360000002 HC RX W HCPCS: Performed by: PAIN MEDICINE

## 2019-09-12 PROCEDURE — 27096 INJECT SACROILIAC JOINT: CPT | Performed by: PAIN MEDICINE

## 2019-09-12 RX ORDER — ROPIVACAINE HYDROCHLORIDE 2 MG/ML
INJECTION, SOLUTION EPIDURAL; INFILTRATION; PERINEURAL PRN
Status: DISCONTINUED | OUTPATIENT
Start: 2019-09-12 | End: 2019-09-12 | Stop reason: ALTCHOICE

## 2019-09-12 RX ORDER — LIDOCAINE HYDROCHLORIDE 20 MG/ML
INJECTION, SOLUTION EPIDURAL; INFILTRATION; INTRACAUDAL; PERINEURAL PRN
Status: DISCONTINUED | OUTPATIENT
Start: 2019-09-12 | End: 2019-09-12 | Stop reason: SDUPTHER

## 2019-09-12 RX ORDER — PROPOFOL 10 MG/ML
INJECTION, EMULSION INTRAVENOUS PRN
Status: DISCONTINUED | OUTPATIENT
Start: 2019-09-12 | End: 2019-09-12 | Stop reason: SDUPTHER

## 2019-09-12 RX ORDER — LIDOCAINE HYDROCHLORIDE 10 MG/ML
INJECTION, SOLUTION INFILTRATION; PERINEURAL PRN
Status: DISCONTINUED | OUTPATIENT
Start: 2019-09-12 | End: 2019-09-12 | Stop reason: ALTCHOICE

## 2019-09-12 RX ORDER — 0.9 % SODIUM CHLORIDE 0.9 %
VIAL (ML) INJECTION PRN
Status: DISCONTINUED | OUTPATIENT
Start: 2019-09-12 | End: 2019-09-12 | Stop reason: SDUPTHER

## 2019-09-12 RX ADMIN — LIDOCAINE HYDROCHLORIDE 40 MG: 20 INJECTION, SOLUTION EPIDURAL; INFILTRATION; INTRACAUDAL; PERINEURAL at 14:50

## 2019-09-12 RX ADMIN — SODIUM CHLORIDE 5 ML: 9 INJECTION, SOLUTION INTRAMUSCULAR; INTRAVENOUS; SUBCUTANEOUS at 14:50

## 2019-09-12 RX ADMIN — PROPOFOL 80 MG: 10 INJECTION, EMULSION INTRAVENOUS at 14:50

## 2019-09-12 ASSESSMENT — PULMONARY FUNCTION TESTS
PIF_VALUE: 0

## 2019-09-12 ASSESSMENT — PAIN - FUNCTIONAL ASSESSMENT: PAIN_FUNCTIONAL_ASSESSMENT: 0-10

## 2019-09-12 ASSESSMENT — PAIN DESCRIPTION - DESCRIPTORS: DESCRIPTORS: CONSTANT

## 2019-09-12 ASSESSMENT — PAIN SCALES - GENERAL: PAINLEVEL_OUTOF10: 0

## 2019-09-12 NOTE — ANESTHESIA PRE PROCEDURE
from Last 3 Encounters:   09/12/19 269 lb (122 kg)   08/30/19 269 lb (122 kg)   08/15/19 271 lb 2.7 oz (123 kg)     Body mass index is 40.9 kg/m². CBC:   Lab Results   Component Value Date    WBC 5.7 05/17/2019    RBC 4.13 05/17/2019    HGB 12.9 05/17/2019    HCT 40.4 05/17/2019    MCV 97.8 05/17/2019    RDW 13.6 03/21/2018     05/17/2019       CMP:   Lab Results   Component Value Date     05/17/2019    K 5.1 05/17/2019     05/17/2019    CO2 25 05/17/2019    BUN 20 05/17/2019    CREATININE 0.7 05/17/2019    GFRAA >60 07/20/2011    AGRATIO 1.9 07/20/2011    LABGLOM 85 05/17/2019    GLUCOSE 81 05/17/2019    PROT 6.5 05/17/2019    PROT 6.6 07/20/2011    CALCIUM 8.9 05/17/2019    BILITOT 0.7 05/17/2019    ALKPHOS 94 05/17/2019    AST 15 05/17/2019    ALT 17 05/17/2019       POC Tests: No results for input(s): POCGLU, POCNA, POCK, POCCL, POCBUN, POCHEMO, POCHCT in the last 72 hours. Coags: No results found for: PROTIME, INR, APTT    HCG (If Applicable): No results found for: PREGTESTUR, PREGSERUM, HCG, HCGQUANT     ABGs: No results found for: PHART, PO2ART, HCH5DUM, NFZ0YAT, BEART, V9QVPGEO     Type & Screen (If Applicable):  No results found for: LABABO, [address]    Anesthesia Evaluation  Patient summary reviewed  Airway: Mallampati: III  TM distance: >3 FB   Neck ROM: full  Mouth opening: > = 3 FB Dental:          Pulmonary:                              Cardiovascular:                      Neuro/Psych:   (+) psychiatric history:            GI/Hepatic/Renal:             Endo/Other:                     Abdominal:           Vascular:                                        Anesthesia Plan      MAC     ASA 2       Induction: intravenous. Anesthetic plan and risks discussed with patient. Plan discussed with CRNA. Amada Ruiz DO   9/12/2019

## 2019-09-12 NOTE — OP NOTE
was identified. Then the angle of the fluoroscopy was adjusted such that the view of the caudal aspect of the joint space was optimized. Then skin and deep tissues over the caudal aspect of the joint were infiltrated with 2 ml of 1% lidocaine. The #22-gauge, 3-1/2 inch spinal needle was introduced through the skin wheal and directed such that the tip of the needle lies in the joint space. This was confirmed by injecting 0.25 ml of Omnipaque-180 through the needle and observing the spread of the contrast along the joint space. Then after negative aspiration a total of 80 mg of depomedrol with 1 ml of  0.2% ropivacaine was injected through the needle. The needle is removed and a Band-Aid was placed over the needle insertion site. The patient tolerated the procedure well and vital signs remained stable. The patient was discharged home in stable condition and will be followed in the pain clinic in the next few weeks or further planning.     Electronically signed by Zonia Gimenez MD on 9/12/19 at 2:55 PM

## 2019-09-12 NOTE — INTERVAL H&P NOTE
H&P Update    Patient's History and Physical from September 5, 2019 was reviewed. Patient examined. There has been no change.     Electronically signed by Trevor Meza MD on 9/12/19 at 2:09 PM

## 2019-09-16 DIAGNOSIS — G89.4 CHRONIC PAIN SYNDROME: ICD-10-CM

## 2019-09-16 DIAGNOSIS — M46.1 SACROILIAC INFLAMMATION (HCC): ICD-10-CM

## 2019-09-16 DIAGNOSIS — M47.816 LUMBAR SPONDYLOSIS: ICD-10-CM

## 2019-09-16 RX ORDER — TRAMADOL HYDROCHLORIDE 50 MG/1
50 TABLET ORAL EVERY 8 HOURS PRN
Qty: 90 TABLET | Refills: 0 | Status: SHIPPED | OUTPATIENT
Start: 2019-09-16 | End: 2019-09-17 | Stop reason: SDUPTHER

## 2019-09-17 DIAGNOSIS — M47.816 LUMBAR SPONDYLOSIS: ICD-10-CM

## 2019-09-17 DIAGNOSIS — G89.4 CHRONIC PAIN SYNDROME: ICD-10-CM

## 2019-09-17 DIAGNOSIS — M46.1 SACROILIAC INFLAMMATION (HCC): ICD-10-CM

## 2019-09-17 RX ORDER — TRAMADOL HYDROCHLORIDE 50 MG/1
50 TABLET ORAL EVERY 8 HOURS PRN
Qty: 90 TABLET | Refills: 0 | Status: SHIPPED | OUTPATIENT
Start: 2019-09-17 | End: 2019-10-16 | Stop reason: SDUPTHER

## 2019-09-26 ENCOUNTER — OFFICE VISIT (OUTPATIENT)
Dept: PHYSICAL MEDICINE AND REHAB | Age: 64
End: 2019-09-26
Payer: COMMERCIAL

## 2019-09-26 VITALS
DIASTOLIC BLOOD PRESSURE: 78 MMHG | BODY MASS INDEX: 40.76 KG/M2 | HEIGHT: 68 IN | SYSTOLIC BLOOD PRESSURE: 126 MMHG | WEIGHT: 268.96 LBS | HEART RATE: 80 BPM

## 2019-09-26 DIAGNOSIS — M53.3 SI (SACROILIAC) PAIN: ICD-10-CM

## 2019-09-26 DIAGNOSIS — M54.17 LUMBOSACRAL RADICULITIS: ICD-10-CM

## 2019-09-26 DIAGNOSIS — M46.1 SACROILIAC INFLAMMATION (HCC): Primary | ICD-10-CM

## 2019-09-26 DIAGNOSIS — G89.4 CHRONIC PAIN SYNDROME: ICD-10-CM

## 2019-09-26 PROCEDURE — 99213 OFFICE O/P EST LOW 20 MIN: CPT | Performed by: NURSE PRACTITIONER

## 2019-09-26 ASSESSMENT — ENCOUNTER SYMPTOMS: BACK PAIN: 1

## 2019-09-26 NOTE — PROGRESS NOTES
for pain. Pain scale with out pain medications or at its worst is 8-9/10. Pain scale with pain medications or at its best is 6/10. Last dose of Ultram was today  Drug screen reviewed from 6/19/2019 and was appropriate  Pill count was completed today and was appropriate  Patient does not have naloxone available at home. Patient has not required use of naloxone at home since last office visit. L-xray:     1. Prior lumbar fusion, L4-5 level with metallic pedicle screws and rods positioned posteriorly. Disc spacer device at L4-5 level. 2. No acute fractures seen. Mild disc space narrowing and degenerative disc disease L2-3 and L5-S1. Mild antegrade spondylolisthesis L4 on L5, 6 mm. Sacroiliac joints unremarkable. 3. Overall appearance of lumbar spine has worsened somewhat since prior study. Prior cholecystectomy. Prior surgery left upper quadrant and left midabdomen.           The patientis allergic to penicillins. Past Medical History  Aristides Rosa  has a past medical history of Chronic back pain, Lumbar spondylosis, Osteopenia, S/P colonoscopy with polypectomy, and Vitamin D deficiency. Past Surgical History  The patient  has a past surgical history that includes Anus surgery; Gastric bypass surgery (1996); Hysterectomy; Tonsillectomy; other surgical history (1/2011); Colonoscopy (2006); lumbar fusion; Cholecystectomy (1990); Rotator cuff repair (Right, 06/13/14); Finger surgery (Left, 01/01/2016); other surgical history (09/13/2017); Nerve Surgery (Bilateral, 7/22/2019); and Injection Procedure For Sacroiliac Joint (Bilateral, 9/12/2019). Family History  This patient's family history includes Cancer in her sister; Diabetes in her mother and sister; Heart Disease in her father and paternal uncle; High Blood Pressure in her father, mother, and sister; Osteoporosis in her mother; Stroke in her mother. Social History  Nelly  reports that she has never smoked.  She has never used smokeless tobacco. She reports that she does not drink alcohol or use drugs. Medications    Current Outpatient Medications:     traMADol (ULTRAM) 50 MG tablet, Take 1 tablet by mouth every 8 hours as needed for Pain for up to 30 days. , Disp: 90 tablet, Rfl: 0    lidocaine (XYLOCAINE) 5 % ointment, Apply topically as needed. , Disp: 1 Tube, Rfl: 1    vitamin D (ERGOCALCIFEROL) 77920 units CAPS capsule, TAKE 1 CAPSULE BY MOUTH TWICE A WEEK, Disp: 24 capsule, Rfl: 3    furosemide (LASIX) 40 MG tablet, Take 1 tablet by mouth daily, Disp: 30 tablet, Rfl: 1    FLUoxetine (PROZAC) 20 MG capsule, Take 1 capsule by mouth daily, Disp: 90 capsule, Rfl: 3    ibuprofen (ADVIL;MOTRIN) 200 MG tablet, Take 200 mg by mouth every 6 hours as needed for Pain, Disp: , Rfl:     MAGNESIUM PO, Take 1 tablet by mouth daily, Disp: , Rfl:     Acetaminophen (TYLENOL PO), Take  by mouth as needed. , Disp: , Rfl:     UNABLE TO FIND, Bariatric Advantage Complete Multi-Formula Vitamin with Vitamin D and B Complex 6 PO QD, Disp: , Rfl:     Subjective:      Review of Systems   Musculoskeletal: Positive for arthralgias, back pain, joint swelling and myalgias. Neurological: Positive for weakness and numbness. Objective:     Vitals:    09/26/19 0741   BP: 126/78   Site: Right Upper Arm   Position: Sitting   Cuff Size: Medium Adult   Pulse: 80   Weight: 268 lb 15.4 oz (122 kg)   Height: 5' 7.99\" (1.727 m)       Physical Exam   Constitutional: She is oriented to person, place, and time. She appears well-developed and well-nourished. No distress. HENT:   Head: Normocephalic and atraumatic. Right Ear: External ear normal.   Left Ear: External ear normal.   Nose: Nose normal.   Mouth/Throat: Oropharynx is clear and moist. No oropharyngeal exudate. Eyes: Pupils are equal, round, and reactive to light. Conjunctivae and EOM are normal. Right eye exhibits no discharge. Left eye exhibits no discharge. No scleral icterus.    Neck: Normal range of motion and full

## 2019-10-16 ENCOUNTER — PREP FOR PROCEDURE (OUTPATIENT)
Dept: PHYSICAL MEDICINE AND REHAB | Age: 64
End: 2019-10-16

## 2019-10-16 DIAGNOSIS — G89.4 CHRONIC PAIN SYNDROME: ICD-10-CM

## 2019-10-16 DIAGNOSIS — M46.1 SACROILIAC INFLAMMATION (HCC): ICD-10-CM

## 2019-10-16 DIAGNOSIS — M47.816 LUMBAR SPONDYLOSIS: ICD-10-CM

## 2019-10-16 RX ORDER — TRAMADOL HYDROCHLORIDE 50 MG/1
50 TABLET ORAL EVERY 8 HOURS PRN
Qty: 90 TABLET | Refills: 0 | Status: SHIPPED | OUTPATIENT
Start: 2019-10-17 | End: 2019-11-11 | Stop reason: SDUPTHER

## 2019-10-25 ENCOUNTER — ANESTHESIA (OUTPATIENT)
Dept: OPERATING ROOM | Age: 64
End: 2019-10-25
Payer: COMMERCIAL

## 2019-10-25 ENCOUNTER — ANESTHESIA EVENT (OUTPATIENT)
Dept: OPERATING ROOM | Age: 64
End: 2019-10-25
Payer: COMMERCIAL

## 2019-10-25 ENCOUNTER — APPOINTMENT (OUTPATIENT)
Dept: GENERAL RADIOLOGY | Age: 64
End: 2019-10-25
Attending: PAIN MEDICINE
Payer: COMMERCIAL

## 2019-10-25 ENCOUNTER — HOSPITAL ENCOUNTER (OUTPATIENT)
Age: 64
Setting detail: OUTPATIENT SURGERY
Discharge: HOME | End: 2019-10-25
Attending: PAIN MEDICINE | Admitting: PAIN MEDICINE
Payer: COMMERCIAL

## 2019-10-25 VITALS
TEMPERATURE: 97.5 F | DIASTOLIC BLOOD PRESSURE: 59 MMHG | RESPIRATION RATE: 16 BRPM | WEIGHT: 269.8 LBS | OXYGEN SATURATION: 94 % | HEIGHT: 68 IN | SYSTOLIC BLOOD PRESSURE: 105 MMHG | HEART RATE: 57 BPM | BODY MASS INDEX: 40.89 KG/M2

## 2019-10-25 VITALS
DIASTOLIC BLOOD PRESSURE: 68 MMHG | OXYGEN SATURATION: 93 % | RESPIRATION RATE: 11 BRPM | SYSTOLIC BLOOD PRESSURE: 141 MMHG

## 2019-10-25 PROCEDURE — 6360000002 HC RX W HCPCS: Performed by: NURSE ANESTHETIST, CERTIFIED REGISTERED

## 2019-10-25 PROCEDURE — 3700000000 HC ANESTHESIA ATTENDED CARE: Performed by: PAIN MEDICINE

## 2019-10-25 PROCEDURE — 27096 INJECT SACROILIAC JOINT: CPT | Performed by: PAIN MEDICINE

## 2019-10-25 PROCEDURE — 3600000054 HC PAIN LEVEL 3 BASE: Performed by: PAIN MEDICINE

## 2019-10-25 PROCEDURE — 7100000010 HC PHASE II RECOVERY - FIRST 15 MIN: Performed by: PAIN MEDICINE

## 2019-10-25 PROCEDURE — 7100000011 HC PHASE II RECOVERY - ADDTL 15 MIN: Performed by: PAIN MEDICINE

## 2019-10-25 PROCEDURE — 3209999900 FLUORO FOR SURGICAL PROCEDURES

## 2019-10-25 PROCEDURE — 2709999900 HC NON-CHARGEABLE SUPPLY: Performed by: PAIN MEDICINE

## 2019-10-25 RX ORDER — FENTANYL CITRATE 50 UG/ML
INJECTION, SOLUTION INTRAMUSCULAR; INTRAVENOUS PRN
Status: DISCONTINUED | OUTPATIENT
Start: 2019-10-25 | End: 2019-10-25 | Stop reason: SDUPTHER

## 2019-10-25 RX ORDER — PROPOFOL 10 MG/ML
INJECTION, EMULSION INTRAVENOUS PRN
Status: DISCONTINUED | OUTPATIENT
Start: 2019-10-25 | End: 2019-10-25 | Stop reason: SDUPTHER

## 2019-10-25 RX ADMIN — FENTANYL CITRATE 50 MCG: 50 INJECTION INTRAMUSCULAR; INTRAVENOUS at 10:49

## 2019-10-25 RX ADMIN — PROPOFOL 100 MG: 10 INJECTION, EMULSION INTRAVENOUS at 10:50

## 2019-10-25 RX ADMIN — FENTANYL CITRATE 50 MCG: 50 INJECTION INTRAMUSCULAR; INTRAVENOUS at 10:44

## 2019-10-25 ASSESSMENT — PULMONARY FUNCTION TESTS
PIF_VALUE: 0

## 2019-10-25 ASSESSMENT — PAIN - FUNCTIONAL ASSESSMENT: PAIN_FUNCTIONAL_ASSESSMENT: 0-10

## 2019-10-29 ENCOUNTER — HOSPITAL ENCOUNTER (EMERGENCY)
Age: 64
Discharge: HOME | End: 2019-10-29
Payer: COMMERCIAL

## 2019-10-29 VITALS
DIASTOLIC BLOOD PRESSURE: 67 MMHG | SYSTOLIC BLOOD PRESSURE: 119 MMHG | HEART RATE: 70 BPM | RESPIRATION RATE: 20 BRPM | OXYGEN SATURATION: 98 % | TEMPERATURE: 98.4 F

## 2019-10-29 DIAGNOSIS — M54.16 LUMBAR RADICULOPATHY: ICD-10-CM

## 2019-10-29 DIAGNOSIS — G89.4 CHRONIC PAIN SYNDROME: Primary | ICD-10-CM

## 2019-10-29 PROCEDURE — 6360000002 HC RX W HCPCS: Performed by: NURSE PRACTITIONER

## 2019-10-29 PROCEDURE — 96372 THER/PROPH/DIAG INJ SC/IM: CPT

## 2019-10-29 PROCEDURE — 99282 EMERGENCY DEPT VISIT SF MDM: CPT

## 2019-10-29 RX ORDER — ORPHENADRINE CITRATE 30 MG/ML
60 INJECTION INTRAMUSCULAR; INTRAVENOUS ONCE
Status: COMPLETED | OUTPATIENT
Start: 2019-10-29 | End: 2019-10-29

## 2019-10-29 RX ORDER — KETOROLAC TROMETHAMINE 30 MG/ML
30 INJECTION, SOLUTION INTRAMUSCULAR; INTRAVENOUS ONCE
Status: COMPLETED | OUTPATIENT
Start: 2019-10-29 | End: 2019-10-29

## 2019-10-29 RX ORDER — MORPHINE SULFATE 4 MG/ML
4 INJECTION, SOLUTION INTRAMUSCULAR; INTRAVENOUS ONCE
Status: COMPLETED | OUTPATIENT
Start: 2019-10-29 | End: 2019-10-29

## 2019-10-29 RX ADMIN — ORPHENADRINE CITRATE 60 MG: 30 INJECTION INTRAMUSCULAR; INTRAVENOUS at 18:07

## 2019-10-29 RX ADMIN — KETOROLAC TROMETHAMINE 30 MG: 30 INJECTION, SOLUTION INTRAMUSCULAR at 18:07

## 2019-10-29 RX ADMIN — MORPHINE SULFATE 4 MG: 4 INJECTION, SOLUTION INTRAMUSCULAR; INTRAVENOUS at 19:03

## 2019-10-29 ASSESSMENT — PAIN DESCRIPTION - LOCATION: LOCATION: BACK

## 2019-10-29 ASSESSMENT — PAIN DESCRIPTION - PAIN TYPE: TYPE: ACUTE PAIN

## 2019-10-29 ASSESSMENT — ENCOUNTER SYMPTOMS
SHORTNESS OF BREATH: 0
TROUBLE SWALLOWING: 0
WHEEZING: 0
BACK PAIN: 1
COUGH: 0
SORE THROAT: 0
CHEST TIGHTNESS: 0
RHINORRHEA: 0

## 2019-10-29 ASSESSMENT — PAIN SCALES - GENERAL
PAINLEVEL_OUTOF10: 10

## 2019-10-29 ASSESSMENT — PAIN DESCRIPTION - DESCRIPTORS: DESCRIPTORS: ACHING

## 2019-10-30 ENCOUNTER — TELEPHONE (OUTPATIENT)
Dept: FAMILY MEDICINE CLINIC | Age: 64
End: 2019-10-30

## 2019-10-30 DIAGNOSIS — F32.A DEPRESSION, UNSPECIFIED DEPRESSION TYPE: ICD-10-CM

## 2019-10-30 RX ORDER — FLUOXETINE HYDROCHLORIDE 20 MG/1
CAPSULE ORAL
Qty: 90 CAPSULE | Refills: 1 | Status: SHIPPED | OUTPATIENT
Start: 2019-10-30 | End: 2020-03-10 | Stop reason: SDUPTHER

## 2019-11-11 ENCOUNTER — OFFICE VISIT (OUTPATIENT)
Dept: PHYSICAL MEDICINE AND REHAB | Age: 64
End: 2019-11-11
Payer: COMMERCIAL

## 2019-11-11 VITALS
BODY MASS INDEX: 40.9 KG/M2 | DIASTOLIC BLOOD PRESSURE: 72 MMHG | HEART RATE: 80 BPM | OXYGEN SATURATION: 96 % | WEIGHT: 269.84 LBS | HEIGHT: 68 IN | SYSTOLIC BLOOD PRESSURE: 126 MMHG

## 2019-11-11 DIAGNOSIS — M47.816 LUMBAR SPONDYLOSIS: ICD-10-CM

## 2019-11-11 DIAGNOSIS — M54.17 LUMBOSACRAL RADICULITIS: ICD-10-CM

## 2019-11-11 DIAGNOSIS — M46.1 SACROILIAC INFLAMMATION (HCC): Primary | ICD-10-CM

## 2019-11-11 DIAGNOSIS — G89.4 CHRONIC PAIN SYNDROME: ICD-10-CM

## 2019-11-11 DIAGNOSIS — M53.3 SI (SACROILIAC) PAIN: ICD-10-CM

## 2019-11-11 DIAGNOSIS — M48.062 LUMBAR STENOSIS WITH NEUROGENIC CLAUDICATION: ICD-10-CM

## 2019-11-11 PROCEDURE — 99213 OFFICE O/P EST LOW 20 MIN: CPT | Performed by: NURSE PRACTITIONER

## 2019-11-11 RX ORDER — TRAMADOL HYDROCHLORIDE 50 MG/1
50-100 TABLET ORAL EVERY 8 HOURS PRN
Qty: 180 TABLET | Refills: 0 | Status: SHIPPED | OUTPATIENT
Start: 2019-11-13 | End: 2019-12-11 | Stop reason: SDUPTHER

## 2019-11-11 ASSESSMENT — ENCOUNTER SYMPTOMS: BACK PAIN: 1

## 2019-12-02 ENCOUNTER — PREP FOR PROCEDURE (OUTPATIENT)
Dept: PHYSICAL MEDICINE AND REHAB | Age: 64
End: 2019-12-02

## 2019-12-11 DIAGNOSIS — G89.4 CHRONIC PAIN SYNDROME: ICD-10-CM

## 2019-12-11 DIAGNOSIS — M46.1 SACROILIAC INFLAMMATION (HCC): ICD-10-CM

## 2019-12-11 DIAGNOSIS — M47.816 LUMBAR SPONDYLOSIS: ICD-10-CM

## 2019-12-11 RX ORDER — TRAMADOL HYDROCHLORIDE 50 MG/1
50-100 TABLET ORAL EVERY 8 HOURS PRN
Qty: 180 TABLET | Refills: 0 | Status: SHIPPED | OUTPATIENT
Start: 2019-12-11 | End: 2020-01-06 | Stop reason: ALTCHOICE

## 2019-12-13 ENCOUNTER — HOSPITAL ENCOUNTER (OUTPATIENT)
Age: 64
Setting detail: OUTPATIENT SURGERY
Discharge: HOME | End: 2019-12-13
Attending: PAIN MEDICINE | Admitting: PAIN MEDICINE
Payer: COMMERCIAL

## 2019-12-13 ENCOUNTER — ANESTHESIA (OUTPATIENT)
Dept: OPERATING ROOM | Age: 64
End: 2019-12-13
Payer: COMMERCIAL

## 2019-12-13 ENCOUNTER — ANESTHESIA EVENT (OUTPATIENT)
Dept: OPERATING ROOM | Age: 64
End: 2019-12-13
Payer: COMMERCIAL

## 2019-12-13 ENCOUNTER — APPOINTMENT (OUTPATIENT)
Dept: GENERAL RADIOLOGY | Age: 64
End: 2019-12-13
Attending: PAIN MEDICINE
Payer: COMMERCIAL

## 2019-12-13 VITALS
SYSTOLIC BLOOD PRESSURE: 110 MMHG | OXYGEN SATURATION: 97 % | RESPIRATION RATE: 13 BRPM | DIASTOLIC BLOOD PRESSURE: 64 MMHG

## 2019-12-13 VITALS
HEART RATE: 56 BPM | OXYGEN SATURATION: 94 % | BODY MASS INDEX: 40.8 KG/M2 | RESPIRATION RATE: 14 BRPM | HEIGHT: 68 IN | DIASTOLIC BLOOD PRESSURE: 69 MMHG | TEMPERATURE: 99 F | SYSTOLIC BLOOD PRESSURE: 122 MMHG | WEIGHT: 269.2 LBS

## 2019-12-13 PROCEDURE — 64635 DESTROY LUMB/SAC FACET JNT: CPT | Performed by: PAIN MEDICINE

## 2019-12-13 PROCEDURE — 3209999900 FLUORO FOR SURGICAL PROCEDURES

## 2019-12-13 PROCEDURE — 7100000011 HC PHASE II RECOVERY - ADDTL 15 MIN: Performed by: PAIN MEDICINE

## 2019-12-13 PROCEDURE — 3600000056 HC PAIN LEVEL 4 BASE: Performed by: PAIN MEDICINE

## 2019-12-13 PROCEDURE — 6360000002 HC RX W HCPCS: Performed by: PAIN MEDICINE

## 2019-12-13 PROCEDURE — 3700000001 HC ADD 15 MINUTES (ANESTHESIA): Performed by: PAIN MEDICINE

## 2019-12-13 PROCEDURE — 3600000057 HC PAIN LEVEL 4 ADDL 15 MIN: Performed by: PAIN MEDICINE

## 2019-12-13 PROCEDURE — 2709999900 HC NON-CHARGEABLE SUPPLY: Performed by: PAIN MEDICINE

## 2019-12-13 PROCEDURE — 7100000010 HC PHASE II RECOVERY - FIRST 15 MIN: Performed by: PAIN MEDICINE

## 2019-12-13 PROCEDURE — 6360000002 HC RX W HCPCS: Performed by: NURSE ANESTHETIST, CERTIFIED REGISTERED

## 2019-12-13 PROCEDURE — 2500000003 HC RX 250 WO HCPCS: Performed by: PAIN MEDICINE

## 2019-12-13 PROCEDURE — 3700000000 HC ANESTHESIA ATTENDED CARE: Performed by: PAIN MEDICINE

## 2019-12-13 PROCEDURE — 2720000010 HC SURG SUPPLY STERILE: Performed by: PAIN MEDICINE

## 2019-12-13 PROCEDURE — 64636 DESTROY L/S FACET JNT ADDL: CPT | Performed by: PAIN MEDICINE

## 2019-12-13 RX ORDER — PROPOFOL 10 MG/ML
INJECTION, EMULSION INTRAVENOUS PRN
Status: DISCONTINUED | OUTPATIENT
Start: 2019-12-13 | End: 2019-12-13 | Stop reason: SDUPTHER

## 2019-12-13 RX ORDER — LIDOCAINE HYDROCHLORIDE 10 MG/ML
INJECTION, SOLUTION EPIDURAL; INFILTRATION; INTRACAUDAL; PERINEURAL PRN
Status: DISCONTINUED | OUTPATIENT
Start: 2019-12-13 | End: 2019-12-13 | Stop reason: ALTCHOICE

## 2019-12-13 RX ORDER — KETOROLAC TROMETHAMINE 30 MG/ML
INJECTION, SOLUTION INTRAMUSCULAR; INTRAVENOUS PRN
Status: DISCONTINUED | OUTPATIENT
Start: 2019-12-13 | End: 2019-12-13 | Stop reason: SDUPTHER

## 2019-12-13 RX ORDER — ROPIVACAINE HYDROCHLORIDE 2 MG/ML
INJECTION, SOLUTION EPIDURAL; INFILTRATION; PERINEURAL PRN
Status: DISCONTINUED | OUTPATIENT
Start: 2019-12-13 | End: 2019-12-13 | Stop reason: ALTCHOICE

## 2019-12-13 RX ADMIN — PROPOFOL 20 MG: 10 INJECTION, EMULSION INTRAVENOUS at 10:45

## 2019-12-13 RX ADMIN — PROPOFOL 20 MG: 10 INJECTION, EMULSION INTRAVENOUS at 10:47

## 2019-12-13 RX ADMIN — KETOROLAC TROMETHAMINE 30 MG: 30 INJECTION, SOLUTION INTRAMUSCULAR; INTRAVENOUS at 10:59

## 2019-12-13 RX ADMIN — PROPOFOL 80 MG: 10 INJECTION, EMULSION INTRAVENOUS at 10:42

## 2019-12-13 RX ADMIN — PROPOFOL 20 MG: 10 INJECTION, EMULSION INTRAVENOUS at 10:53

## 2019-12-13 RX ADMIN — PROPOFOL 20 MG: 10 INJECTION, EMULSION INTRAVENOUS at 10:57

## 2019-12-13 RX ADMIN — PROPOFOL 20 MG: 10 INJECTION, EMULSION INTRAVENOUS at 10:59

## 2019-12-13 RX ADMIN — PROPOFOL 20 MG: 10 INJECTION, EMULSION INTRAVENOUS at 10:55

## 2019-12-13 ASSESSMENT — PULMONARY FUNCTION TESTS
PIF_VALUE: 0

## 2019-12-13 ASSESSMENT — PAIN DESCRIPTION - DESCRIPTORS: DESCRIPTORS: ACHING

## 2019-12-13 ASSESSMENT — PAIN SCALES - GENERAL: PAINLEVEL_OUTOF10: 0

## 2019-12-13 ASSESSMENT — PAIN - FUNCTIONAL ASSESSMENT: PAIN_FUNCTIONAL_ASSESSMENT: 0-10

## 2019-12-31 ENCOUNTER — OFFICE VISIT (OUTPATIENT)
Dept: PHYSICAL MEDICINE AND REHAB | Age: 64
End: 2019-12-31
Payer: COMMERCIAL

## 2019-12-31 VITALS
WEIGHT: 269.18 LBS | HEIGHT: 68 IN | DIASTOLIC BLOOD PRESSURE: 82 MMHG | BODY MASS INDEX: 40.8 KG/M2 | SYSTOLIC BLOOD PRESSURE: 136 MMHG

## 2019-12-31 DIAGNOSIS — M16.11 PRIMARY OSTEOARTHRITIS OF RIGHT HIP: ICD-10-CM

## 2019-12-31 DIAGNOSIS — M54.17 LUMBOSACRAL RADICULITIS: ICD-10-CM

## 2019-12-31 DIAGNOSIS — G89.4 CHRONIC PAIN SYNDROME: ICD-10-CM

## 2019-12-31 DIAGNOSIS — M48.062 LUMBAR STENOSIS WITH NEUROGENIC CLAUDICATION: ICD-10-CM

## 2019-12-31 DIAGNOSIS — M46.1 SACROILIAC INFLAMMATION (HCC): Primary | ICD-10-CM

## 2019-12-31 DIAGNOSIS — M53.3 SI (SACROILIAC) PAIN: ICD-10-CM

## 2019-12-31 PROCEDURE — 99214 OFFICE O/P EST MOD 30 MIN: CPT | Performed by: NURSE PRACTITIONER

## 2019-12-31 RX ORDER — HYDROCODONE BITARTRATE AND ACETAMINOPHEN 5; 325 MG/1; MG/1
1 TABLET ORAL EVERY 6 HOURS PRN
Qty: 28 TABLET | Refills: 0 | Status: SHIPPED | OUTPATIENT
Start: 2019-12-31 | End: 2020-01-08 | Stop reason: SDUPTHER

## 2020-01-06 ENCOUNTER — OFFICE VISIT (OUTPATIENT)
Dept: FAMILY MEDICINE CLINIC | Age: 65
End: 2020-01-06
Payer: COMMERCIAL

## 2020-01-06 VITALS
SYSTOLIC BLOOD PRESSURE: 122 MMHG | RESPIRATION RATE: 12 BRPM | WEIGHT: 262.2 LBS | DIASTOLIC BLOOD PRESSURE: 72 MMHG | HEART RATE: 60 BPM | BODY MASS INDEX: 39.88 KG/M2

## 2020-01-06 PROCEDURE — 99213 OFFICE O/P EST LOW 20 MIN: CPT | Performed by: FAMILY MEDICINE

## 2020-01-06 ASSESSMENT — ENCOUNTER SYMPTOMS
BACK PAIN: 1
RESPIRATORY NEGATIVE: 1
GASTROINTESTINAL NEGATIVE: 1

## 2020-01-09 RX ORDER — HYDROCODONE BITARTRATE AND ACETAMINOPHEN 5; 325 MG/1; MG/1
1 TABLET ORAL EVERY 6 HOURS PRN
Qty: 120 TABLET | Refills: 0 | Status: SHIPPED | OUTPATIENT
Start: 2020-01-09 | End: 2020-02-08

## 2020-01-13 ENCOUNTER — HOSPITAL ENCOUNTER (OUTPATIENT)
Dept: MRI IMAGING | Age: 65
Discharge: HOME | End: 2020-01-13
Payer: COMMERCIAL

## 2020-01-13 PROCEDURE — 73721 MRI JNT OF LWR EXTRE W/O DYE: CPT

## 2020-01-14 ENCOUNTER — TELEPHONE (OUTPATIENT)
Dept: FAMILY MEDICINE CLINIC | Age: 65
End: 2020-01-14

## 2020-01-14 NOTE — TELEPHONE ENCOUNTER
Pt notified. Ok for Dr Darian Niño or Dr Khadijah Gao. Called [facility] appt made 240pm 1/16/20 dr Lyn Dawson. Pt notified      ----- Message from Morey Bosworth, MD sent at 1/13/2020 10:11 AM EST -----  Notify her that her MRI of the hip shows moderately severe arthritis of the hip and also a labral tear. Needs ortho eval.  Please refer.  CG

## 2020-01-17 ENCOUNTER — PREP FOR PROCEDURE (OUTPATIENT)
Dept: PHYSICAL MEDICINE AND REHAB | Age: 65
End: 2020-01-17

## 2020-01-31 ENCOUNTER — TELEPHONE (OUTPATIENT)
Dept: CARDIOLOGY | Age: 65
End: 2020-01-31

## 2020-02-09 ENCOUNTER — PATIENT MESSAGE (OUTPATIENT)
Dept: PHYSICAL MEDICINE AND REHAB | Age: 65
End: 2020-02-09

## 2020-02-10 RX ORDER — HYDROCODONE BITARTRATE AND ACETAMINOPHEN 5; 325 MG/1; MG/1
1 TABLET ORAL EVERY 6 HOURS PRN
Qty: 88 TABLET | Refills: 0 | Status: SHIPPED | OUTPATIENT
Start: 2020-02-10 | End: 2020-03-03

## 2020-02-10 NOTE — TELEPHONE ENCOUNTER
OARRS reviewed. UDS: + for  Fluoxitine, tramadol - consistent. Narcan offered: offered  Last seen: 12/31/2019. Follow-up: after hip sx - set up enough until surgery.

## 2020-02-13 ENCOUNTER — OFFICE VISIT (OUTPATIENT)
Dept: CARDIOLOGY CLINIC | Age: 65
End: 2020-02-13
Payer: COMMERCIAL

## 2020-02-13 VITALS
HEIGHT: 68 IN | DIASTOLIC BLOOD PRESSURE: 74 MMHG | HEART RATE: 70 BPM | BODY MASS INDEX: 39.28 KG/M2 | SYSTOLIC BLOOD PRESSURE: 136 MMHG | WEIGHT: 259.2 LBS

## 2020-02-13 PROCEDURE — 99213 OFFICE O/P EST LOW 20 MIN: CPT | Performed by: INTERNAL MEDICINE
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
## 2020-02-18 ENCOUNTER — OFFICE VISIT (OUTPATIENT)
Dept: FAMILY MEDICINE CLINIC | Age: 65
End: 2020-02-18
Payer: COMMERCIAL

## 2020-02-18 VITALS
SYSTOLIC BLOOD PRESSURE: 144 MMHG | WEIGHT: 265 LBS | RESPIRATION RATE: 16 BRPM | BODY MASS INDEX: 40.16 KG/M2 | DIASTOLIC BLOOD PRESSURE: 88 MMHG | HEIGHT: 68 IN | TEMPERATURE: 98.2 F | HEART RATE: 64 BPM

## 2020-02-18 PROCEDURE — 99243 OFF/OP CNSLTJ NEW/EST LOW 30: CPT | Performed by: FAMILY MEDICINE
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
## 2020-02-19 ASSESSMENT — ENCOUNTER SYMPTOMS
SHORTNESS OF BREATH: 0
VOMITING: 0
NAUSEA: 0
BLOOD IN STOOL: 0
DIARRHEA: 0
ABDOMINAL PAIN: 0
EYES NEGATIVE: 1

## 2020-03-08 ENCOUNTER — PATIENT MESSAGE (OUTPATIENT)
Dept: FAMILY MEDICINE CLINIC | Age: 65
End: 2020-03-08

## 2020-03-09 NOTE — TELEPHONE ENCOUNTER
From: Chandrakant Suárez  To: Alice Nuñez MD  Sent: 3/8/2020 12:38 PM EDT  Subject: Non-Urgent Medical Question    It seems Dons death and the surgery and subsequent home bound recoup has sent me into a blue state. Can you prescribe something for mild depression.

## 2020-03-10 RX ORDER — FLUOXETINE HYDROCHLORIDE 20 MG/1
40 CAPSULE ORAL DAILY
Qty: 60 CAPSULE | Refills: 11 | Status: SHIPPED | OUTPATIENT
Start: 2020-03-10 | End: 2021-04-01

## 2020-03-10 NOTE — TELEPHONE ENCOUNTER
CG--patient now states that she was actually only taking Prozac 20 mg. Ok to increase to 40 mg with same instructions for f/u? #60/11? If above ok, send refill to [facility]. No need to call back unless different instructions.

## 2020-03-10 NOTE — TELEPHONE ENCOUNTER
Rx EP'd to pharmacy. Please notify patient.       Requested Prescriptions     Signed Prescriptions Disp Refills    FLUoxetine (PROZAC) 20 MG capsule 60 capsule 11     Sig: Take 2 capsules by mouth daily     Authorizing Provider: Jesus Louise           Electronically signed by Vicky Bai MD on 3/10/2020 at 10:42 AM

## 2020-03-16 ENCOUNTER — PATIENT MESSAGE (OUTPATIENT)
Dept: FAMILY MEDICINE CLINIC | Age: 65
End: 2020-03-16

## 2020-03-16 NOTE — TELEPHONE ENCOUNTER
I would recommend that pt try to do an E-Visit if possible. If pt can not do an e-visit, and is not having any fever, cough, or SOB, OK for appointment in the office. If she is having these symptoms, we can not see in the office.   -WS

## 2020-03-16 NOTE — TELEPHONE ENCOUNTER
From: Fatou Saavedra  To: Dionicio Pham MD  Sent: 3/16/2020 1:18 PM EDT  Subject: Non-Urgent Medical Question    Susan, I'm going to need to be seen for this depression.       ----- Message -----   From:Nurse Rhoda Recinos   Sent:3/9/2020 1:51 PM EDT   To:Nelly Calhoun   Subject:RE: Non-Urgent Medical Question    Are you still taking the Prozac 40 mg daily?      ----- Message -----   From:Nelly Calhoun   IDDC:0/5/1640 12:38 PM EDT   To:Joselin Clements MD   Subject:Non-Urgent Medical Question    It seems Dons death and the surgery and subsequent home bound recoup has sent me into a blue state. Can you prescribe something for mild depression.

## 2020-03-17 ENCOUNTER — OFFICE VISIT (OUTPATIENT)
Dept: FAMILY MEDICINE CLINIC | Age: 65
End: 2020-03-17
Payer: COMMERCIAL

## 2020-03-17 VITALS
HEART RATE: 72 BPM | DIASTOLIC BLOOD PRESSURE: 76 MMHG | BODY MASS INDEX: 38.01 KG/M2 | WEIGHT: 250 LBS | TEMPERATURE: 98.1 F | RESPIRATION RATE: 16 BRPM | SYSTOLIC BLOOD PRESSURE: 110 MMHG

## 2020-03-17 PROCEDURE — 99213 OFFICE O/P EST LOW 20 MIN: CPT | Performed by: FAMILY MEDICINE

## 2020-03-17 RX ORDER — BUPROPION HYDROCHLORIDE 150 MG/1
150 TABLET ORAL EVERY MORNING
Qty: 30 TABLET | Refills: 11 | Status: SHIPPED | OUTPATIENT
Start: 2020-03-17 | End: 2020-09-02

## 2020-03-17 RX ORDER — HYDROCODONE BITARTRATE AND ACETAMINOPHEN 5; 325 MG/1; MG/1
TABLET ORAL
COMMUNITY
Start: 2020-03-11 | End: 2020-07-20

## 2020-03-17 ASSESSMENT — ENCOUNTER SYMPTOMS
GASTROINTESTINAL NEGATIVE: 1
RESPIRATORY NEGATIVE: 1

## 2020-04-04 ENCOUNTER — PATIENT MESSAGE (OUTPATIENT)
Dept: FAMILY MEDICINE CLINIC | Age: 65
End: 2020-04-04

## 2020-04-06 RX ORDER — TRAZODONE HYDROCHLORIDE 50 MG/1
50 TABLET ORAL NIGHTLY PRN
Qty: 30 TABLET | Refills: 0 | Status: SHIPPED | OUTPATIENT
Start: 2020-04-06 | End: 2020-08-10

## 2020-04-06 NOTE — TELEPHONE ENCOUNTER
From: [place]  To: Lyndle Landau, MD  Sent: 4/4/2020 11:56 AM EDT  Subject: Prescription Question    Wondering if I can get a short term script for something to help me sleep. Haven't slept well since my hip replacement and then with all the COVID pandemic, I just toss and turn till 2 or 3 in the morning. If so, just send to [facility].

## 2020-04-13 RX ORDER — FLUOXETINE HYDROCHLORIDE 20 MG/1
CAPSULE ORAL
Qty: 90 CAPSULE | Refills: 1 | OUTPATIENT
Start: 2020-04-13

## 2020-04-13 RX ORDER — ERGOCALCIFEROL 1.25 MG/1
50000 CAPSULE ORAL
Qty: 24 CAPSULE | Refills: 3 | Status: SHIPPED | OUTPATIENT
Start: 2020-04-13 | End: 2021-04-01

## 2020-07-10 ENCOUNTER — PATIENT MESSAGE (OUTPATIENT)
Dept: FAMILY MEDICINE CLINIC | Age: 65
End: 2020-07-10

## 2020-07-10 NOTE — TELEPHONE ENCOUNTER
From: Cristela Vera  To: Dina Jon MD  Sent: 7/10/2020 11:41 AM EDT  Subject: Prescription Question    Dr Tavarez [place] -   I'm still having sleeping issues. Just restless and unable to shutdown my mind at night. I've been limiting my computer and tv prior to bedtime and still taking the Melatonin, but not having any luck. [facility] taking Temazpam 15 mg and says she feels so much better. Gets to sleep quick and doesn't wake up groggy. Just wondering if this would be a good option for me. I've tried the \"pm\" pain relievers over the counter and they make me feel so groggy in the morning.     Thanks, Jessica Lizarraga

## 2020-07-13 RX ORDER — TRAZODONE HYDROCHLORIDE 50 MG/1
50 TABLET ORAL NIGHTLY PRN
Qty: 30 TABLET | Refills: 0 | Status: SHIPPED | OUTPATIENT
Start: 2020-07-13 | End: 2020-08-10

## 2020-07-15 RX ORDER — FUROSEMIDE 40 MG/1
40 TABLET ORAL DAILY
Qty: 30 TABLET | Refills: 1 | Status: SHIPPED
Start: 2020-07-15 | End: 2020-10-19 | Stop reason: DRUGHIGH

## 2020-07-15 NOTE — TELEPHONE ENCOUNTER
Rx EP'd to pharmacy. Please notify patient.       Requested Prescriptions     Signed Prescriptions Disp Refills    furosemide (LASIX) 40 MG tablet 30 tablet 1     Sig: Take 1 tablet by mouth daily     Authorizing Provider: Prince Warren           Electronically signed by Casey Cervantes MD on 7/15/2020 at 3:41 PM

## 2020-07-20 ENCOUNTER — HOSPITAL ENCOUNTER (EMERGENCY)
Age: 65
Discharge: HOME | End: 2020-07-20
Payer: COMMERCIAL

## 2020-07-20 VITALS
BODY MASS INDEX: 33.45 KG/M2 | WEIGHT: 220 LBS | TEMPERATURE: 97.1 F | DIASTOLIC BLOOD PRESSURE: 59 MMHG | RESPIRATION RATE: 16 BRPM | HEART RATE: 76 BPM | SYSTOLIC BLOOD PRESSURE: 115 MMHG | OXYGEN SATURATION: 95 %

## 2020-07-20 PROCEDURE — 99212 OFFICE O/P EST SF 10 MIN: CPT

## 2020-07-20 PROCEDURE — 99213 OFFICE O/P EST LOW 20 MIN: CPT | Performed by: NURSE PRACTITIONER

## 2020-07-20 ASSESSMENT — PAIN DESCRIPTION - ORIENTATION: ORIENTATION: LEFT;LOWER

## 2020-07-20 ASSESSMENT — PAIN DESCRIPTION - FREQUENCY: FREQUENCY: CONTINUOUS

## 2020-07-20 ASSESSMENT — PAIN SCALES - GENERAL: PAINLEVEL_OUTOF10: 6

## 2020-07-20 ASSESSMENT — PAIN DESCRIPTION - DESCRIPTORS: DESCRIPTORS: ACHING;THROBBING

## 2020-07-20 ASSESSMENT — PAIN DESCRIPTION - LOCATION: LOCATION: LEG

## 2020-07-20 ASSESSMENT — PAIN - FUNCTIONAL ASSESSMENT: PAIN_FUNCTIONAL_ASSESSMENT: PREVENTS OR INTERFERES SOME ACTIVE ACTIVITIES AND ADLS

## 2020-07-20 ASSESSMENT — PAIN DESCRIPTION - PAIN TYPE: TYPE: ACUTE PAIN

## 2020-07-20 NOTE — ED NOTES
Patient understood instructions verbally,  Follow up with PCP with any concerns, or worse leg swelling , redness, increased pain, fever,  follow up with ED. ambulated self to lobby,stable condition.       Cameron Mckeon LPN  [phone]

## 2020-07-21 ENCOUNTER — TELEPHONE (OUTPATIENT)
Dept: FAMILY MEDICINE CLINIC | Age: 65
End: 2020-07-21

## 2020-07-21 ASSESSMENT — ENCOUNTER SYMPTOMS
NAUSEA: 0
SHORTNESS OF BREATH: 0
DIARRHEA: 0
TROUBLE SWALLOWING: 0
VOMITING: 0

## 2020-07-21 NOTE — ED PROVIDER NOTES
Total hip arthroplasty (Right, 03/02/2020). CURRENT MEDICATIONS       Discharge Medication List as of 7/20/2020  7:22 PM      CONTINUE these medications which have NOT CHANGED    Details   furosemide (LASIX) 40 MG tablet Take 1 tablet by mouth daily, Disp-30 tablet,R-1Normal      !! traZODone (DESYREL) 50 MG tablet Take 1 tablet by mouth nightly as needed for Sleep, Disp-30 tablet,R-0Normal      !! traZODone (DESYREL) 50 MG tablet Take 1 tablet by mouth nightly as needed for Sleep, Disp-30 tablet, R-0Normal      buPROPion (WELLBUTRIN XL) 150 MG extended release tablet Take 1 tablet by mouth every morning, Disp-30 tablet, R-11Normal      FLUoxetine (PROZAC) 20 MG capsule Take 2 capsules by mouth daily, Disp-60 capsule, R-11Normal      ibuprofen (ADVIL;MOTRIN) 200 MG tablet Take 200 mg by mouth every 6 hours as needed for PainHistorical Med      vitamin D (ERGOCALCIFEROL) 1.25 MG (08751 UT) CAPS capsule TAKE 1 CAPSULE BY MOUTH TWICE A WEEK, Disp-24 capsule,R-3Normal       !! - Potential duplicate medications found. Please discuss with provider. ALLERGIES     Patient is is allergic to penicillins. FAMILY HISTORY     Patient'sfamily history includes Cancer in her sister; Diabetes in her mother and sister; Heart Disease in her father and paternal uncle; High Blood Pressure in her father, mother, and sister; Osteoporosis in her mother; Stroke in her mother. SOCIAL HISTORY     Patient  reports that she has never smoked. She has never used smokeless tobacco. She reports that she does not drink alcohol or use drugs. PHYSICAL EXAM     ED TRIAGE VITALS  BP: (!) 115/59, Temp: 97.1 °F (36.2 °C), Pulse: 76, Resp: 16, SpO2: 95 %  Physical Exam  Vitals signs and nursing note reviewed. Constitutional:       General: She is not in acute distress. Appearance: Normal appearance. She is well-developed. She is not ill-appearing, toxic-appearing or diaphoretic. HENT:      Head: Normocephalic and atraumatic. Eyes:      General: No scleral icterus. Conjunctiva/sclera: Conjunctivae normal.   Cardiovascular:      Pulses:           Posterior tibial pulses are 2+ on the right side and 2+ on the left side. Pulmonary:      Effort: No respiratory distress. Musculoskeletal:      Right lower leg: Normal.      Left lower leg: She exhibits tenderness. She exhibits no bony tenderness, no swelling and no deformity. No edema. Skin:     General: Skin is warm and dry. Capillary Refill: Capillary refill takes less than 2 seconds. Findings: Abrasion and bruising present. No ecchymosis or erythema. Comments: Small abrasion w/o infection LLE   Neurological:      Mental Status: She is alert and oriented to person, place, and time. Sensory: Sensation is intact. Psychiatric:         Mood and Affect: Mood normal.         Behavior: Behavior is cooperative. DIAGNOSTIC RESULTS   Labs: No results found for this visit on 07/20/20. IMAGING:  No orders to display     URGENT CARE COURSE:     Vitals:    07/20/20 1908   BP: (!) 115/59   Pulse: 76   Resp: 16   Temp: 97.1 °F (36.2 °C)   TempSrc: Temporal   SpO2: 95%   Weight: 220 lb (99.8 kg)       Medications - No data to display  PROCEDURES:  None  FINALIMPRESSION      1. Abrasion of left lower extremity, initial encounter        DISPOSITION/PLAN   DISPOSITION Decision To Discharge 07/20/2020 07:22:03 PM  Nontoxic, no acute distress. Exam consistent with abrasion of left lower leg. Pulses 2+ bilateral.  No secondary infection. Patient wishes to apply antibiotic ointment at home. Follow-up as needed. If any distress go to ER. PATIENT REFERRED TO:  Curtis Alfonso[address]  [phone]      Follow up as needed. Keep clean/dry. Daily antibiotic ointment. Elevate legs. If worse return or go to ER.     DISCHARGE MEDICATIONS:  Discharge Medication List as of 7/20/2020  7:22 PM        Discharge Medication List as of 7/20/2020  7:22 PM          [address] [facility] Omid Nguyen, PATEL - KATY  07/21/20 1214

## 2020-08-10 ENCOUNTER — OFFICE VISIT (OUTPATIENT)
Dept: SURGERY | Age: 65
End: 2020-08-10
Payer: COMMERCIAL

## 2020-08-10 VITALS
HEIGHT: 68 IN | DIASTOLIC BLOOD PRESSURE: 74 MMHG | BODY MASS INDEX: 36.04 KG/M2 | HEART RATE: 70 BPM | WEIGHT: 237.8 LBS | SYSTOLIC BLOOD PRESSURE: 112 MMHG | TEMPERATURE: 97.3 F

## 2020-08-10 PROCEDURE — 99205 OFFICE O/P NEW HI 60 MIN: CPT | Performed by: SURGERY

## 2020-08-10 RX ORDER — TRAZODONE HYDROCHLORIDE 50 MG/1
50 TABLET ORAL NIGHTLY PRN
Qty: 30 TABLET | Refills: 0 | Status: SHIPPED | OUTPATIENT
Start: 2020-08-10 | End: 2020-09-09 | Stop reason: SDUPTHER

## 2020-08-14 ENCOUNTER — EMPLOYEE WELLNESS (OUTPATIENT)
Dept: OTHER | Age: 65
End: 2020-08-14

## 2020-08-14 LAB
CHOLESTEROL, TOTAL: 152 MG/DL (ref 0–199)
FASTING: YES
GLUCOSE BLD-MCNC: 90 MG/DL (ref 74–109)
HDLC SERPL-MCNC: 60 MG/DL (ref 40–90)
LDL CHOLESTEROL CALCULATED: 80 MG/DL
TRIGL SERPL-MCNC: 61 MG/DL (ref 0–199)

## 2020-08-24 ASSESSMENT — ENCOUNTER SYMPTOMS
BACK PAIN: 1
FACIAL SWELLING: 0
SINUS PAIN: 0
ABDOMINAL PAIN: 0
SHORTNESS OF BREATH: 0
COUGH: 0
TROUBLE SWALLOWING: 0
PHOTOPHOBIA: 0
COLOR CHANGE: 0
SINUS PRESSURE: 0
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
## 2020-08-28 ENCOUNTER — TELEPHONE (OUTPATIENT)
Dept: SURGERY | Age: 65
End: 2020-08-28

## 2020-08-28 NOTE — TELEPHONE ENCOUNTER
Patients insurance carrier, [employer], called today to discuss some changes with their current criteria for meeting the approval for panniculectomy. They need to know specific medication patient has used for intertrigo and what the abdominal pannus interferes with. Just an addendum to her ov will be fine. She also needs a mammogram.    I called patient to discuss. She uses gold bond medicated powder when intertrigo rash begins, but if it gets areas of weeping with drainage, she then uses a triple antibiotic and gauze dressings. She states the abdominal pannus affects most areas of her daily life. From getting dressed, using the bathroom, bathing, housework, and exercise are all more difficult. If Dr Eric Dunn can add addendum by Monday they will hold case open. If not, we will close and resubmit.

## 2020-08-31 NOTE — TELEPHONE ENCOUNTER
Spoke with juan. As long as mammogram result is in by Friday case can remain open. I will download new ov note to armond.

## 2020-09-02 ENCOUNTER — OFFICE VISIT (OUTPATIENT)
Dept: FAMILY MEDICINE CLINIC | Age: 65
End: 2020-09-02
Payer: COMMERCIAL

## 2020-09-02 VITALS
BODY MASS INDEX: 35.37 KG/M2 | DIASTOLIC BLOOD PRESSURE: 74 MMHG | RESPIRATION RATE: 16 BRPM | TEMPERATURE: 97.2 F | SYSTOLIC BLOOD PRESSURE: 100 MMHG | WEIGHT: 232.6 LBS | HEART RATE: 66 BPM

## 2020-09-02 PROCEDURE — 99213 OFFICE O/P EST LOW 20 MIN: CPT | Performed by: FAMILY MEDICINE

## 2020-09-02 ASSESSMENT — ENCOUNTER SYMPTOMS: SHORTNESS OF BREATH: 0

## 2020-09-02 NOTE — PROGRESS NOTES
Legs:    Lymphadenopathy:      Cervical: No cervical adenopathy. Neurological:      Mental Status: She is alert. ASSESSMENT      1. Traumatic hematoma of left lower leg, subsequent encounter    2. Vitamin D deficiency    3. Intestinal malabsorption, unspecified type    4. B12 deficiency    5. Depression, unspecified depression type        PLAN     Apply local heat to the hematoma. It should resolve slowly over time.     Update labs as below    Continue current meds    Mammogram tomorrow    Flu shot soon      Orders Placed This Encounter   Procedures    Vitamin D 25 Hydroxy     Standing Status:   Future     Standing Expiration Date:   9/2/2021    TSH without Reflex     Standing Status:   Future     Standing Expiration Date:   9/3/2021    T4, Free     Standing Status:   Future     Standing Expiration Date:   9/2/2021    Vitamin B12     Standing Status:   Future     Standing Expiration Date:   9/2/2021         Follow up if not better            Electronically signed by Queenie Pike MD on 9/2/2020 at 10:01 AM

## 2020-09-03 ENCOUNTER — HOSPITAL ENCOUNTER (OUTPATIENT)
Dept: WOMENS IMAGING | Age: 65
Discharge: HOME | End: 2020-09-03
Payer: COMMERCIAL

## 2020-09-03 PROCEDURE — 77063 BREAST TOMOSYNTHESIS BI: CPT

## 2020-09-09 RX ORDER — TRAZODONE HYDROCHLORIDE 50 MG/1
50 TABLET ORAL NIGHTLY PRN
Qty: 30 TABLET | Refills: 0 | OUTPATIENT
Start: 2020-09-09

## 2020-09-09 RX ORDER — TRAZODONE HYDROCHLORIDE 50 MG/1
50 TABLET ORAL NIGHTLY PRN
Qty: 30 TABLET | Refills: 1 | Status: SHIPPED | OUTPATIENT
Start: 2020-09-09 | End: 2020-11-10

## 2020-09-09 RX ORDER — FUROSEMIDE 40 MG/1
40 TABLET ORAL DAILY
Qty: 30 TABLET | Refills: 1 | OUTPATIENT
Start: 2020-09-09

## 2020-09-10 NOTE — TELEPHONE ENCOUNTER
[address] called asking if someone can please check with the provider as to why the Furosemide was refused as \"not appropriate\". Is she supposed to stop taking it? Please advise.     DOLV  9/2/20

## 2020-10-02 ENCOUNTER — E-VISIT (OUTPATIENT)
Dept: FAMILY MEDICINE CLINIC | Age: 65
End: 2020-10-02
Payer: COMMERCIAL

## 2020-10-02 PROCEDURE — 99422 OL DIG E/M SVC 11-20 MIN: CPT | Performed by: FAMILY MEDICINE

## 2020-10-02 RX ORDER — METHYLPREDNISOLONE 4 MG/1
TABLET ORAL
Qty: 1 KIT | Refills: 0 | Status: SHIPPED | OUTPATIENT
Start: 2020-10-02 | End: 2020-10-08

## 2020-10-02 NOTE — PROGRESS NOTES
59 yo female with history as below submitted per Kelsea Young encounter:    General Medical Concern for your PCP  --------------------------------     Question: Are you having any chest pain or new or worsening shortness of breath? Answer: No     Question: Are you having fevers higher than 103? Answer: No     Question: Have you recently fainted or felt like you might faint? Answer: No     Question: Have you been experiencing persistent, abnormal bleeding? Answer: No     Question: Are you having moderate to severe abdominal pain? Answer: No     Question: Are you having severe headaches? Answer: No     Question: Have you recently experienced sudden weakness or inability to move a body part(s)? Answer: No     Question: Have you recently experienced drooping on one side of your face, difficulty speaking, loss of sensation or confusion? Answer: No     Question: Have you recently experienced seizures, convulsions or uncontrollable shaking? Answer: No     Question: Have you noticed any new changes in vision or visual loss? Answer: No     Question: Are you experiencing severe depression or suicidal thoughts? Answer: No     Question: Have you intentionally or unintentionally taken more medication than directed or ingested any type of poison? Answer: No     Question: Please describe your symptoms/medical concern. Answer:   Left shoulder tendonitis     Question: When did this problem begin? Answer:   Monday     Question: Can you think of anything that might have triggered this problem (such as new medications or supplements, injury, increased stress, etc.)? Answer: Yes     Question: If yes, please describe. Answer:   Packing up camper for vacation and sitting outside in cold weather around [facility]     Question: Are your symptoms constant or do they come and go?   Answer:   Symptoms are constant     Question: Overall, are your symptoms getting worse, staying about the same, or getting

## 2020-10-07 ENCOUNTER — TELEPHONE (OUTPATIENT)
Dept: FAMILY MEDICINE CLINIC | Age: 65
End: 2020-10-07

## 2020-10-07 ENCOUNTER — OFFICE VISIT (OUTPATIENT)
Dept: FAMILY MEDICINE CLINIC | Age: 65
End: 2020-10-07
Payer: COMMERCIAL

## 2020-10-07 VITALS
TEMPERATURE: 97 F | SYSTOLIC BLOOD PRESSURE: 116 MMHG | BODY MASS INDEX: 34.82 KG/M2 | RESPIRATION RATE: 12 BRPM | DIASTOLIC BLOOD PRESSURE: 68 MMHG | WEIGHT: 229 LBS | HEART RATE: 64 BPM

## 2020-10-07 PROCEDURE — 99213 OFFICE O/P EST LOW 20 MIN: CPT | Performed by: NURSE PRACTITIONER

## 2020-10-07 ASSESSMENT — ENCOUNTER SYMPTOMS
CONSTIPATION: 0
NAUSEA: 0
BLOOD IN STOOL: 0
DIARRHEA: 0
SHORTNESS OF BREATH: 0
VOMITING: 0

## 2020-10-07 NOTE — PROGRESS NOTES
Chief Complaint   Patient presents with    Shoulder Pain     left shoulder pain, several months          SUBJECTIVE     Meek Hays is a 60 y. o.female      Pt complains of left shoulder pain for at least 6 months. Started while she was on crutches for her back/hip. Pain increased over weekend while camping. She denies any numbness or tingling in arm. Has weakness in arm only due to pain, hand grasps are strong. She has tried ice, heat, motrin, arm sling without improvement. She did a video visit a few days ago as she was thinking the pain was from a tendonitis and was sent in a medrol dose alia. Pt states this has not helped at all. Review of Systems   Constitutional: Negative for chills, diaphoresis and fever. Respiratory: Negative for shortness of breath. Cardiovascular: Negative for chest pain, palpitations and leg swelling. Gastrointestinal: Negative for blood in stool, constipation, diarrhea, nausea and vomiting. Genitourinary: Negative for dysuria and hematuria. Musculoskeletal: Positive for arthralgias (left shoulder pain). Negative for myalgias. Neurological: Negative for dizziness and headaches. All other systems reviewed and are negative. OBJECTIVE     /68   Pulse 64   Temp 97 °F (36.1 °C) (Temporal)   Resp 12   Wt 229 lb (103.9 kg)   BMI 34.82 kg/m²     Physical Exam  Vitals signs and nursing note reviewed. Constitutional:       Appearance: She is well-developed. HENT:      Head: Normocephalic and atraumatic. Right Ear: External ear normal.      Left Ear: External ear normal.      Nose: Nose normal.   Eyes:      Conjunctiva/sclera: Conjunctivae normal.      Pupils: Pupils are equal, round, and reactive to light. Neck:      Musculoskeletal: Normal range of motion and neck supple. Cardiovascular:      Rate and Rhythm: Normal rate and regular rhythm. Heart sounds: Normal heart sounds.    Pulmonary:      Effort: Pulmonary effort is normal.      Breath

## 2020-10-07 NOTE — TELEPHONE ENCOUNTER
Pt was seen today on 10-7-20. She was referred to Dr Philip Altamirano at [facility] and she is scheduled for 11-3-20 at 9:10am. I LMTCB with the pt but I also mycharted her info to her.

## 2020-10-07 NOTE — PATIENT INSTRUCTIONS
Patient Education        Shoulder Stretches: Exercises  Introduction  Here are some examples of exercises for you to try. The exercises may be suggested for a condition or for rehabilitation. Start each exercise slowly. Ease off the exercises if you start to have pain. You will be told when to start these exercises and which ones will work best for you. How to do the exercises  Shoulder stretch   1.  a doorway and place one arm against the door frame. Your elbow should be a little higher than your shoulder. 2. Relax your shoulders as you lean forward, allowing your chest and shoulder muscles to stretch. You can also turn your body slightly away from your arm to stretch the muscles even more. 3. Hold for 15 to 30 seconds. 4. Repeat 2 to 4 times with each arm. Shoulder and chest stretch   1. Shoulder and chest stretch  2. While sitting, relax your upper body so you slump slightly in your chair. 3. As you breathe in, straighten your back and open your arms out to the sides. 4. Gently pull your shoulder blades back and downward. 5. Hold for 15 to 30 seconds as your breathe normally. 6. Repeat 2 to 4 times. Overhead stretch   1. Reach up over your head with both arms. 2. Hold for 15 to 30 seconds. 3. Repeat 2 to 4 times. Follow-up care is a key part of your treatment and safety. Be sure to make and go to all appointments, and call your doctor if you are having problems. It's also a good idea to know your test results and keep a list of the medicines you take. Where can you learn more? Go to [email or web address] org and sign in to your [employer] account. Enter S254 in the [employer] box to learn more about \"Shoulder Stretches: Exercises. \"     If you do not have an account, please click on the \"Sign Up Now\" link. Current as of: March 2, 2020               Content Version: 12.6  © 6798-5247 [employer].    Care instructions adapted under license by

## 2020-10-08 ENCOUNTER — HOSPITAL ENCOUNTER (OUTPATIENT)
Age: 65
Discharge: HOME | End: 2020-10-08
Payer: COMMERCIAL

## 2020-10-08 LAB
T4 FREE: 1.09 NG/DL (ref 0.93–1.76)
TSH SERPL DL<=0.05 MIU/L-ACNC: 1.4 UIU/ML (ref 0.4–4.2)
VITAMIN B-12: 280 PG/ML (ref 211–911)
VITAMIN D 25-HYDROXY: 40 NG/ML (ref 30–100)

## 2020-10-08 PROCEDURE — 82607 VITAMIN B-12: CPT

## 2020-10-08 PROCEDURE — 82306 VITAMIN D 25 HYDROXY: CPT

## 2020-10-08 PROCEDURE — 84443 ASSAY THYROID STIM HORMONE: CPT

## 2020-10-08 PROCEDURE — 36415 COLL VENOUS BLD VENIPUNCTURE: CPT

## 2020-10-08 PROCEDURE — 84439 ASSAY OF FREE THYROXINE: CPT

## 2020-10-08 NOTE — TELEPHONE ENCOUNTER
Pt notified. She states that she is planning on calling out to [facility] to reschedule this appt as it will not work for her. She did not need anything from the office at this time.

## 2020-10-16 ENCOUNTER — OFFICE VISIT (OUTPATIENT)
Dept: SURGERY | Age: 65
End: 2020-10-16
Payer: COMMERCIAL

## 2020-10-16 VITALS
HEART RATE: 63 BPM | WEIGHT: 225.2 LBS | DIASTOLIC BLOOD PRESSURE: 74 MMHG | SYSTOLIC BLOOD PRESSURE: 114 MMHG | TEMPERATURE: 97.2 F | BODY MASS INDEX: 34.24 KG/M2

## 2020-10-16 PROCEDURE — 99214 OFFICE O/P EST MOD 30 MIN: CPT | Performed by: SURGERY
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
## 2020-10-16 NOTE — H&P (VIEW-ONLY)
HISTORY AND PHYSICAL             Date: 10/16/2020        Patient Name: Nils Mendoza     YOB: 1955      Age:  60 y.o. Chief Complaint   No chief complaint on file. Symptomatic macromastia    History Obtained From   patient    History of Present Illness   Prashant Stacy is a 13-UTWN-MFW white female who was referred to my office by her primary care physician Dr. Lavonne Joseph for evaluation of options following massive weight loss and the subsequent effects upon her breasts and abdomen. Her gastric bypass was performed 1996, and subsequently she lost over 180 pounds. Secondary to her weight loss she has had issues relating to the abdominal pannus with related intertriginous rash that requires prescription antifungal medications and worsens especially during the warmer months. She states that the abdominal pannus affects most areas of her daily life, and this includes getting dressed, using the bathroom, bathing, housework, and exercise is extremely difficult. She also has several years of complaints of neck pain, upper and lower back pain, shoulder strap grooving, inframammary rash related to her large breast size even following the massive weight loss.     Past Medical History     Past Medical History:   Diagnosis Date    Chronic back pain     Lumbar spondylosis     Osteopenia     S/P colonoscopy with polypectomy     Vitamin D deficiency         Past Surgical History     Past Surgical History:   Procedure Laterality Date    ANUS SURGERY      CHOLECYSTECTOMY  1990    COLONOSCOPY  2006    W/ Polypectomy    FINGER SURGERY Left 01/01/2016    Thumb flexor tendon repair--Dr. Romana Sam GASTRIC BYPASS SURGERY  1996    [facility]. INJECTION PROCEDURE FOR SACROILIAC JOINT Bilateral 9/12/2019    SACROILIAC JOINT INJECTION- Right SI MBB #1 performed by Rl South MD at [address] [place] [address] Bilateral 10/25/2019    SACROILIAC JOINT INJECTION- Right SI MBB Pressure Mother     Stroke Mother     Osteoporosis Mother     Heart Disease Father     High Blood Pressure Father     Cancer Sister         melanoma    Diabetes Sister     High Blood Pressure Sister     Heart Disease Paternal Uncle     Thyroid Disease Neg Hx        Review of Systems   Review of Systems   Constitutional: Negative for activity change, appetite change, chills, fatigue, fever and unexpected weight change. HENT: Negative for dental problem, facial swelling, nosebleeds, sinus pressure, sinus pain and trouble swallowing. Eyes: Negative for photophobia and visual disturbance. Respiratory: Negative for cough and shortness of breath. Cardiovascular: Negative for chest pain and leg swelling. Gastrointestinal: Negative for abdominal distention, abdominal pain, diarrhea, nausea and vomiting. Endocrine: Negative for cold intolerance and heat intolerance. Musculoskeletal: Positive for arthralgias and back pain. Negative for neck pain and neck stiffness. Skin: Negative for color change, pallor, rash and wound. Neurological: Negative for dizziness, facial asymmetry, light-headedness, numbness and headaches. Hematological: Does not bruise/bleed easily. Physical Exam   /74   Pulse 63   Temp 97.2 °F (36.2 °C)   Wt 225 lb 3.2 oz (102.2 kg)   BMI 34.24 kg/m²     Physical Exam  Vitals signs and nursing note reviewed. Exam conducted with a chaperone present. Constitutional:       General: She is awake. Appearance: Normal appearance. She is well-developed and well-groomed. HENT:      Head: Normocephalic and atraumatic. Jaw: There is normal jaw occlusion. Right Ear: External ear normal.      Left Ear: External ear normal.      Nose: Nose normal.      Mouth/Throat:      Mouth: Mucous membranes are moist.      Pharynx: Oropharynx is clear. Eyes:      General: Lids are normal. Vision grossly intact. Extraocular Movements: Extraocular movements intact. Conjunctiva/sclera: Conjunctivae normal.      Pupils: Pupils are equal, round, and reactive to light. Neck:      Musculoskeletal: Full passive range of motion without pain, normal range of motion and neck supple. Trachea: Trachea and phonation normal.   Cardiovascular:      Rate and Rhythm: Normal rate. Pulses: Normal pulses. Pulmonary:      Effort: Pulmonary effort is normal.   Chest:      Breasts: Taqueria Score is 5. Right: Normal. No swelling, bleeding, inverted nipple, mass, nipple discharge, skin change or tenderness. Left: Normal. No swelling, bleeding, inverted nipple, mass, nipple discharge, skin change or tenderness. Comments: Bilateral grade 3 ptosis  Abdominal:      General: Abdomen is flat. Bowel sounds are normal.   Musculoskeletal: Normal range of motion. Lymphadenopathy:      Upper Body:      Right upper body: No supraclavicular, axillary or pectoral adenopathy. Left upper body: No supraclavicular, axillary or pectoral adenopathy. Skin:     General: Skin is warm. Capillary Refill: Capillary refill takes less than 2 seconds. Neurological:      General: No focal deficit present. Mental Status: She is alert and oriented to person, place, and time. Mental status is at baseline. Psychiatric:         Mood and Affect: Mood normal.         Behavior: Behavior normal. Behavior is cooperative. Thought Content:  Thought content normal.         Judgment: Judgment normal.        Breast  Breast Measurements:    Right Left   A: Sternal notch to nipple distance (cm) 38 37   B: Midsternum to nipple distance (cm) 14.5 15   C: Midclavicle to nipple distance (cm)       D: Nipple to inframammary fold (cm) 13.5 13   E: Base width (cm) 22 22   F: Inframammary distance (cm)         Right Left   Areolar diameter (cm)   6   Nipple diameter (cm) 1 1   Superior tissue pinch test (cm)       Breast ptosis (grade 0-3) 3 3         Breast:  Breast mass: NoBilateral Nipple Sensation: NoBilateral  Breast scars: NoBilateral        Shoulder strap grooving: YesBilateral  Rash: YesBilateral                    Nipple to IM fold:                               Right: 13.5 cm                        Left: 13 cm  Nipple to Midline:                              Right: 14.5 cm                        Left: 15 cm  Sternal Notch to Nipple:                   Right: 38 cm               Left: 37 cm  Anticipated Resection Amount:      Right: 980 grams       Left: 980 grams      Labs    No results found for this or any previous visit (from the past 24 hour(s)). Imaging/Diagnostics Last 24 Hours   No results found. Assessment    Symptomatic macromastia    Plan   1.  Bilateral reduction mammoplasty    Consultations Ordered:  None    Electronically signed by Matt Butts MD on 10/16/20 at 1:19 PM EDT

## 2020-10-19 ENCOUNTER — HOSPITAL ENCOUNTER (OUTPATIENT)
Age: 65
Discharge: HOME | End: 2020-10-19
Payer: COMMERCIAL

## 2020-10-19 ENCOUNTER — NURSE ONLY (OUTPATIENT)
Dept: LAB | Age: 65
End: 2020-10-19

## 2020-10-19 VITALS — WEIGHT: 236 LBS | BODY MASS INDEX: 35.88 KG/M2

## 2020-10-19 LAB
ANION GAP SERPL CALCULATED.3IONS-SCNC: 10 MEQ/L (ref 8–16)
BUN BLDV-MCNC: 18 MG/DL (ref 7–22)
CALCIUM SERPL-MCNC: 9.5 MG/DL (ref 8.5–10.5)
CHLORIDE BLD-SCNC: 103 MEQ/L (ref 98–111)
CO2: 27 MEQ/L (ref 23–33)
CREAT SERPL-MCNC: 0.7 MG/DL (ref 0.4–1.2)
ERYTHROCYTE [DISTWIDTH] IN BLOOD BY AUTOMATED COUNT: 16.4 % (ref 11.5–14.5)
ERYTHROCYTE [DISTWIDTH] IN BLOOD BY AUTOMATED COUNT: 50.4 FL (ref 35–45)
GFR SERPL CREATININE-BSD FRML MDRD: 84 ML/MIN/1.73M2
GLUCOSE BLD-MCNC: 89 MG/DL (ref 70–108)
HCT VFR BLD CALC: 39.8 % (ref 37–47)
HEMOGLOBIN: 11.5 GM/DL (ref 12–16)
MCH RBC QN AUTO: 24.8 PG (ref 26–33)
MCHC RBC AUTO-ENTMCNC: 28.9 GM/DL (ref 32.2–35.5)
MCV RBC AUTO: 86 FL (ref 81–99)
PLATELET # BLD: 274 THOU/MM3 (ref 130–400)
PMV BLD AUTO: 10.8 FL (ref 9.4–12.4)
POTASSIUM SERPL-SCNC: 4.5 MEQ/L (ref 3.5–5.2)
RBC # BLD: 4.63 MILL/MM3 (ref 4.2–5.4)
SODIUM BLD-SCNC: 140 MEQ/L (ref 135–145)
WBC # BLD: 6.1 THOU/MM3 (ref 4.8–10.8)

## 2020-10-19 PROCEDURE — U0003 INFECTIOUS AGENT DETECTION BY NUCLEIC ACID (DNA OR RNA); SEVERE ACUTE RESPIRATORY SYNDROME CORONAVIRUS 2 (SARS-COV-2) (CORONAVIRUS DISEASE [COVID-19]), AMPLIFIED PROBE TECHNIQUE, MAKING USE OF HIGH THROUGHPUT TECHNOLOGIES AS DESCRIBED BY CMS-2020-01-R: HCPCS

## 2020-10-19 RX ORDER — FUROSEMIDE 40 MG/1
40 TABLET ORAL DAILY PRN
COMMUNITY
End: 2022-04-28 | Stop reason: SDUPTHER

## 2020-10-19 NOTE — PROGRESS NOTES
In preparation for their surgical procedure above patient was screened for Obstructive Sleep Apnea (CATALINA) using the STOP-Bang Questionnaire by the Pre-Admission Testing department. This is a pre-surgical screening tool for patient safety and serves as a recommendation, this WILL NOT cause cancellation of surgery. STOP-Bang Questionnaire  * Do you currently see a pulmonologist?  No     If yes STOP, do not complete. Patient follows with Dr.     1.  Do you snore loudly (able to be heard in the next room)? No    2. Do you often feel tired or sleepy during the daytime? No       3. Has anyone ever told you that you stop breathing during your sleep? No    4. Do you have or are you being treated for high blood pressure? No      5. BMI more than 35? BMI (Calculated): 33.1        No    6. Age over 48 years? 60 y.o. Yes    7. Neck Circumference greater than 17 inches for male or 16 inches for female? Measured           (visits only)            Not Applicable    8. Gender Male? No      TOTAL SCORE: 1    CATALINA - Low Risk : Yes to 0 - 2 questions  CATALINA - Intermediate Risk : Yes to 3 - 4 questions  CATALINA - High Risk : Yes to 5 - 8 questions    Adapted from:   STOP Questionnaire: A Tool to Screen Patients for Obstructive Sleep Apnea   NILSA Oneill.C.P.C., ANTONI Hilliard.B.B.S., Saji Boyle M.D., Delano Joseph. Chris Davis, Ph.D., ANTONI Barton.B.B.S., Mechelle Cesar, M.Sc., Nahomy Collins M.D., Nain Ferrera. CAITLIN Gomez.P.C.    Anesthesiology 2008; [id number] Copyright 2008, the [address] [employer]est[employer], [facility] 37.   ----------------------------------------------------------------------------------------------------------------

## 2020-10-20 LAB — SARS-COV-2: NOT DETECTED

## 2020-10-20 RX ORDER — SODIUM CHLORIDE 0.9 % (FLUSH) 0.9 %
10 SYRINGE (ML) INJECTION PRN
Status: CANCELLED | OUTPATIENT
Start: 2020-10-20

## 2020-10-20 RX ORDER — SODIUM CHLORIDE 0.9 % (FLUSH) 0.9 %
10 SYRINGE (ML) INJECTION EVERY 12 HOURS SCHEDULED
Status: CANCELLED | OUTPATIENT
Start: 2020-10-20

## 2020-10-20 ASSESSMENT — ENCOUNTER SYMPTOMS
SINUS PAIN: 0
TROUBLE SWALLOWING: 0
ABDOMINAL PAIN: 0
DIARRHEA: 0
COLOR CHANGE: 0
SINUS PRESSURE: 0
VOMITING: 0
ABDOMINAL DISTENTION: 0
BACK PAIN: 1
COUGH: 0
NAUSEA: 0
SHORTNESS OF BREATH: 0
PHOTOPHOBIA: 0
FACIAL SWELLING: 0

## 2020-10-20 ASSESSMENT — VISUAL ACUITY: OU: 1

## 2020-10-26 ENCOUNTER — ANESTHESIA (OUTPATIENT)
Dept: OPERATING ROOM | Age: 65
End: 2020-10-26
Payer: COMMERCIAL

## 2020-10-26 ENCOUNTER — ANESTHESIA EVENT (OUTPATIENT)
Dept: OPERATING ROOM | Age: 65
End: 2020-10-26
Payer: COMMERCIAL

## 2020-10-26 ENCOUNTER — HOSPITAL ENCOUNTER (OUTPATIENT)
Age: 65
Setting detail: OBSERVATION
Discharge: HOME | End: 2020-10-27
Attending: SURGERY | Admitting: SURGERY
Payer: COMMERCIAL

## 2020-10-26 VITALS — DIASTOLIC BLOOD PRESSURE: 53 MMHG | OXYGEN SATURATION: 100 % | TEMPERATURE: 96.3 F | SYSTOLIC BLOOD PRESSURE: 94 MMHG

## 2020-10-26 PROBLEM — N62 MACROMASTIA: Status: ACTIVE | Noted: 2020-10-26

## 2020-10-26 PROBLEM — Z98.890 S/P PANNICULECTOMY: Status: ACTIVE | Noted: 2020-10-26

## 2020-10-26 LAB — POTASSIUM SERPL-SCNC: 4.9 MEQ/L (ref 3.5–5.2)

## 2020-10-26 PROCEDURE — C1729 CATH, DRAINAGE: HCPCS | Performed by: SURGERY

## 2020-10-26 PROCEDURE — 88307 TISSUE EXAM BY PATHOLOGIST: CPT

## 2020-10-26 PROCEDURE — 3700000000 HC ANESTHESIA ATTENDED CARE: Performed by: SURGERY

## 2020-10-26 PROCEDURE — 7100000001 HC PACU RECOVERY - ADDTL 15 MIN: Performed by: SURGERY

## 2020-10-26 PROCEDURE — 84132 ASSAY OF SERUM POTASSIUM: CPT

## 2020-10-26 PROCEDURE — 88331 PATH CONSLTJ SURG 1 BLK 1SPC: CPT

## 2020-10-26 PROCEDURE — 3600000014 HC SURGERY LEVEL 4 ADDTL 15MIN: Performed by: SURGERY

## 2020-10-26 PROCEDURE — 3600000004 HC SURGERY LEVEL 4 BASE: Performed by: SURGERY

## 2020-10-26 PROCEDURE — 88305 TISSUE EXAM BY PATHOLOGIST: CPT

## 2020-10-26 PROCEDURE — 2709999900 HC NON-CHARGEABLE SUPPLY: Performed by: SURGERY

## 2020-10-26 PROCEDURE — 15830 EXC EXCESSIVE SKIN ABDOMEN: CPT | Performed by: SURGERY

## 2020-10-26 PROCEDURE — 2500000003 HC RX 250 WO HCPCS: Performed by: NURSE ANESTHETIST, CERTIFIED REGISTERED

## 2020-10-26 PROCEDURE — 2580000003 HC RX 258: Performed by: NURSE ANESTHETIST, CERTIFIED REGISTERED

## 2020-10-26 PROCEDURE — 6360000002 HC RX W HCPCS: Performed by: NURSE ANESTHETIST, CERTIFIED REGISTERED

## 2020-10-26 PROCEDURE — 19318 BREAST REDUCTION: CPT | Performed by: SURGERY

## 2020-10-26 PROCEDURE — 6370000000 HC RX 637 (ALT 250 FOR IP): Performed by: SURGERY

## 2020-10-26 PROCEDURE — 2500000003 HC RX 250 WO HCPCS: Performed by: SURGERY

## 2020-10-26 PROCEDURE — L8000 MASTECTOMY BRA: HCPCS | Performed by: SURGERY

## 2020-10-26 PROCEDURE — 6360000002 HC RX W HCPCS: Performed by: SURGERY

## 2020-10-26 PROCEDURE — 2580000003 HC RX 258: Performed by: SURGERY

## 2020-10-26 PROCEDURE — 2500000003 HC RX 250 WO HCPCS

## 2020-10-26 PROCEDURE — G0378 HOSPITAL OBSERVATION PER HR: HCPCS

## 2020-10-26 PROCEDURE — 7100000000 HC PACU RECOVERY - FIRST 15 MIN: Performed by: SURGERY

## 2020-10-26 PROCEDURE — 36415 COLL VENOUS BLD VENIPUNCTURE: CPT

## 2020-10-26 PROCEDURE — 3700000001 HC ADD 15 MINUTES (ANESTHESIA): Performed by: SURGERY

## 2020-10-26 RX ORDER — HYDRALAZINE HYDROCHLORIDE 20 MG/ML
5 INJECTION INTRAMUSCULAR; INTRAVENOUS EVERY 10 MIN PRN
Status: DISCONTINUED | OUTPATIENT
Start: 2020-10-26 | End: 2020-10-26 | Stop reason: HOSPADM

## 2020-10-26 RX ORDER — PROPOFOL 10 MG/ML
INJECTION, EMULSION INTRAVENOUS PRN
Status: DISCONTINUED | OUTPATIENT
Start: 2020-10-26 | End: 2020-10-26 | Stop reason: SDUPTHER

## 2020-10-26 RX ORDER — SODIUM CHLORIDE 0.9 % (FLUSH) 0.9 %
10 SYRINGE (ML) INJECTION EVERY 12 HOURS SCHEDULED
Status: DISCONTINUED | OUTPATIENT
Start: 2020-10-26 | End: 2020-10-26 | Stop reason: HOSPADM

## 2020-10-26 RX ORDER — MORPHINE SULFATE 2 MG/ML
2 INJECTION, SOLUTION INTRAMUSCULAR; INTRAVENOUS EVERY 5 MIN PRN
Status: DISCONTINUED | OUTPATIENT
Start: 2020-10-26 | End: 2020-10-26 | Stop reason: HOSPADM

## 2020-10-26 RX ORDER — LIDOCAINE HCL/PF 100 MG/5ML
SYRINGE (ML) INJECTION PRN
Status: DISCONTINUED | OUTPATIENT
Start: 2020-10-26 | End: 2020-10-26 | Stop reason: SDUPTHER

## 2020-10-26 RX ORDER — CLINDAMYCIN PHOSPHATE 900 MG/50ML
900 INJECTION INTRAVENOUS EVERY 8 HOURS
Status: COMPLETED | OUTPATIENT
Start: 2020-10-26 | End: 2020-10-27

## 2020-10-26 RX ORDER — ROCURONIUM BROMIDE 10 MG/ML
INJECTION, SOLUTION INTRAVENOUS PRN
Status: DISCONTINUED | OUTPATIENT
Start: 2020-10-26 | End: 2020-10-26 | Stop reason: SDUPTHER

## 2020-10-26 RX ORDER — MORPHINE SULFATE 4 MG/ML
4 INJECTION, SOLUTION INTRAMUSCULAR; INTRAVENOUS
Status: DISCONTINUED | OUTPATIENT
Start: 2020-10-26 | End: 2020-10-27 | Stop reason: HOSPADM

## 2020-10-26 RX ORDER — SODIUM CHLORIDE 9 MG/ML
INJECTION, SOLUTION INTRAVENOUS CONTINUOUS PRN
Status: DISCONTINUED | OUTPATIENT
Start: 2020-10-26 | End: 2020-10-26 | Stop reason: SDUPTHER

## 2020-10-26 RX ORDER — PROMETHAZINE HYDROCHLORIDE 25 MG/1
12.5 TABLET ORAL EVERY 6 HOURS PRN
Status: DISCONTINUED | OUTPATIENT
Start: 2020-10-26 | End: 2020-10-27 | Stop reason: HOSPADM

## 2020-10-26 RX ORDER — ACETAMINOPHEN 325 MG/1
650 TABLET ORAL EVERY 4 HOURS PRN
Status: DISCONTINUED | OUTPATIENT
Start: 2020-10-26 | End: 2020-10-27 | Stop reason: HOSPADM

## 2020-10-26 RX ORDER — ACETAMINOPHEN 325 MG/1
650 TABLET ORAL EVERY 4 HOURS PRN
Status: DISCONTINUED | OUTPATIENT
Start: 2020-10-26 | End: 2020-10-26

## 2020-10-26 RX ORDER — OXYCODONE HYDROCHLORIDE 5 MG/1
5 TABLET ORAL EVERY 4 HOURS PRN
Status: DISCONTINUED | OUTPATIENT
Start: 2020-10-26 | End: 2020-10-27 | Stop reason: HOSPADM

## 2020-10-26 RX ORDER — ESMOLOL HYDROCHLORIDE 10 MG/ML
INJECTION INTRAVENOUS PRN
Status: DISCONTINUED | OUTPATIENT
Start: 2020-10-26 | End: 2020-10-26 | Stop reason: SDUPTHER

## 2020-10-26 RX ORDER — MEPERIDINE HYDROCHLORIDE 25 MG/ML
12.5 INJECTION INTRAMUSCULAR; INTRAVENOUS; SUBCUTANEOUS EVERY 5 MIN PRN
Status: DISCONTINUED | OUTPATIENT
Start: 2020-10-26 | End: 2020-10-26 | Stop reason: HOSPADM

## 2020-10-26 RX ORDER — OXYCODONE HYDROCHLORIDE 5 MG/1
10 TABLET ORAL EVERY 4 HOURS PRN
Status: DISCONTINUED | OUTPATIENT
Start: 2020-10-26 | End: 2020-10-27 | Stop reason: HOSPADM

## 2020-10-26 RX ORDER — GLYCOPYRROLATE 1 MG/5 ML
SYRINGE (ML) INTRAVENOUS PRN
Status: DISCONTINUED | OUTPATIENT
Start: 2020-10-26 | End: 2020-10-26 | Stop reason: SDUPTHER

## 2020-10-26 RX ORDER — ONDANSETRON 2 MG/ML
4 INJECTION INTRAMUSCULAR; INTRAVENOUS EVERY 6 HOURS PRN
Status: DISCONTINUED | OUTPATIENT
Start: 2020-10-26 | End: 2020-10-27 | Stop reason: HOSPADM

## 2020-10-26 RX ORDER — CLINDAMYCIN PHOSPHATE 900 MG/50ML
900 INJECTION INTRAVENOUS
Status: COMPLETED | OUTPATIENT
Start: 2020-10-26 | End: 2020-10-26

## 2020-10-26 RX ORDER — NEOSTIGMINE METHYLSULFATE 5 MG/5 ML
SYRINGE (ML) INTRAVENOUS PRN
Status: DISCONTINUED | OUTPATIENT
Start: 2020-10-26 | End: 2020-10-26 | Stop reason: SDUPTHER

## 2020-10-26 RX ORDER — DEXAMETHASONE SODIUM PHOSPHATE 4 MG/ML
INJECTION, SOLUTION INTRA-ARTICULAR; INTRALESIONAL; INTRAMUSCULAR; INTRAVENOUS; SOFT TISSUE PRN
Status: DISCONTINUED | OUTPATIENT
Start: 2020-10-26 | End: 2020-10-26 | Stop reason: SDUPTHER

## 2020-10-26 RX ORDER — FENTANYL CITRATE 50 UG/ML
50 INJECTION, SOLUTION INTRAMUSCULAR; INTRAVENOUS EVERY 5 MIN PRN
Status: DISCONTINUED | OUTPATIENT
Start: 2020-10-26 | End: 2020-10-26 | Stop reason: HOSPADM

## 2020-10-26 RX ORDER — ONDANSETRON 2 MG/ML
4 INJECTION INTRAMUSCULAR; INTRAVENOUS
Status: DISCONTINUED | OUTPATIENT
Start: 2020-10-26 | End: 2020-10-26 | Stop reason: HOSPADM

## 2020-10-26 RX ORDER — SODIUM CHLORIDE 0.9 % (FLUSH) 0.9 %
10 SYRINGE (ML) INJECTION PRN
Status: DISCONTINUED | OUTPATIENT
Start: 2020-10-26 | End: 2020-10-26 | Stop reason: HOSPADM

## 2020-10-26 RX ORDER — DIPHENHYDRAMINE HYDROCHLORIDE 50 MG/ML
12.5 INJECTION INTRAMUSCULAR; INTRAVENOUS
Status: DISCONTINUED | OUTPATIENT
Start: 2020-10-26 | End: 2020-10-26 | Stop reason: HOSPADM

## 2020-10-26 RX ORDER — MORPHINE SULFATE 2 MG/ML
2 INJECTION, SOLUTION INTRAMUSCULAR; INTRAVENOUS
Status: DISCONTINUED | OUTPATIENT
Start: 2020-10-26 | End: 2020-10-27 | Stop reason: HOSPADM

## 2020-10-26 RX ORDER — SODIUM CHLORIDE 0.9 % (FLUSH) 0.9 %
10 SYRINGE (ML) INJECTION PRN
Status: DISCONTINUED | OUTPATIENT
Start: 2020-10-26 | End: 2020-10-27 | Stop reason: HOSPADM

## 2020-10-26 RX ORDER — ONDANSETRON 2 MG/ML
INJECTION INTRAMUSCULAR; INTRAVENOUS PRN
Status: DISCONTINUED | OUTPATIENT
Start: 2020-10-26 | End: 2020-10-26 | Stop reason: SDUPTHER

## 2020-10-26 RX ORDER — FENTANYL CITRATE 50 UG/ML
INJECTION, SOLUTION INTRAMUSCULAR; INTRAVENOUS PRN
Status: DISCONTINUED | OUTPATIENT
Start: 2020-10-26 | End: 2020-10-26 | Stop reason: SDUPTHER

## 2020-10-26 RX ORDER — SODIUM CHLORIDE 0.9 % (FLUSH) 0.9 %
10 SYRINGE (ML) INJECTION EVERY 12 HOURS SCHEDULED
Status: DISCONTINUED | OUTPATIENT
Start: 2020-10-26 | End: 2020-10-27 | Stop reason: HOSPADM

## 2020-10-26 RX ORDER — SODIUM CHLORIDE 9 MG/ML
INJECTION, SOLUTION INTRAVENOUS CONTINUOUS
Status: DISCONTINUED | OUTPATIENT
Start: 2020-10-26 | End: 2020-10-27 | Stop reason: HOSPADM

## 2020-10-26 RX ORDER — LABETALOL 20 MG/4 ML (5 MG/ML) INTRAVENOUS SYRINGE
5 4 TIMES DAILY PRN
Status: DISCONTINUED | OUTPATIENT
Start: 2020-10-26 | End: 2020-10-26 | Stop reason: HOSPADM

## 2020-10-26 RX ORDER — PHENYLEPHRINE HYDROCHLORIDE 10 MG/ML
INJECTION INTRAVENOUS PRN
Status: DISCONTINUED | OUTPATIENT
Start: 2020-10-26 | End: 2020-10-26 | Stop reason: SDUPTHER

## 2020-10-26 RX ADMIN — PROPOFOL 150 MG: 10 INJECTION, EMULSION INTRAVENOUS at 07:52

## 2020-10-26 RX ADMIN — PHENYLEPHRINE HYDROCHLORIDE 100 MCG: 10 INJECTION INTRAVENOUS at 08:20

## 2020-10-26 RX ADMIN — MORPHINE SULFATE 4 MG: 4 INJECTION, SOLUTION INTRAMUSCULAR; INTRAVENOUS at 15:16

## 2020-10-26 RX ADMIN — Medication 0.2 MG: at 08:18

## 2020-10-26 RX ADMIN — CLINDAMYCIN PHOSPHATE 900 MG: 900 INJECTION, SOLUTION INTRAVENOUS at 23:55

## 2020-10-26 RX ADMIN — SODIUM CHLORIDE: 9 INJECTION, SOLUTION INTRAVENOUS at 15:16

## 2020-10-26 RX ADMIN — MORPHINE SULFATE 4 MG: 4 INJECTION, SOLUTION INTRAMUSCULAR; INTRAVENOUS at 18:06

## 2020-10-26 RX ADMIN — SODIUM CHLORIDE: 9 INJECTION, SOLUTION INTRAVENOUS at 07:43

## 2020-10-26 RX ADMIN — ROCURONIUM BROMIDE 10 MG: 10 INJECTION INTRAVENOUS at 10:00

## 2020-10-26 RX ADMIN — CLINDAMYCIN PHOSPHATE 900 MG: 900 INJECTION, SOLUTION INTRAVENOUS at 15:16

## 2020-10-26 RX ADMIN — Medication 0.8 MG: at 11:04

## 2020-10-26 RX ADMIN — PHENYLEPHRINE HYDROCHLORIDE 100 MCG: 10 INJECTION INTRAVENOUS at 10:02

## 2020-10-26 RX ADMIN — OXYCODONE HYDROCHLORIDE 10 MG: 5 TABLET ORAL at 20:58

## 2020-10-26 RX ADMIN — SODIUM CHLORIDE: 9 INJECTION, SOLUTION INTRAVENOUS at 09:45

## 2020-10-26 RX ADMIN — PHENYLEPHRINE HYDROCHLORIDE 100 MCG: 10 INJECTION INTRAVENOUS at 09:48

## 2020-10-26 RX ADMIN — ONDANSETRON HYDROCHLORIDE 4 MG: 4 INJECTION, SOLUTION INTRAMUSCULAR; INTRAVENOUS at 11:00

## 2020-10-26 RX ADMIN — SODIUM CHLORIDE: 9 INJECTION, SOLUTION INTRAVENOUS at 21:04

## 2020-10-26 RX ADMIN — CLINDAMYCIN PHOSPHATE 900 MG: 900 INJECTION, SOLUTION INTRAVENOUS at 07:58

## 2020-10-26 RX ADMIN — FENTANYL CITRATE 50 MCG: 50 INJECTION, SOLUTION INTRAMUSCULAR; INTRAVENOUS at 08:41

## 2020-10-26 RX ADMIN — PHENYLEPHRINE HYDROCHLORIDE 100 MCG: 10 INJECTION INTRAVENOUS at 09:29

## 2020-10-26 RX ADMIN — ROCURONIUM BROMIDE 10 MG: 10 INJECTION INTRAVENOUS at 09:05

## 2020-10-26 RX ADMIN — ROCURONIUM BROMIDE 50 MG: 10 INJECTION INTRAVENOUS at 07:52

## 2020-10-26 RX ADMIN — ESMOLOL HYDROCHLORIDE 20 MG: 10 INJECTION, SOLUTION INTRAVENOUS at 09:02

## 2020-10-26 RX ADMIN — FENTANYL CITRATE 100 MCG: 50 INJECTION, SOLUTION INTRAMUSCULAR; INTRAVENOUS at 07:56

## 2020-10-26 RX ADMIN — Medication 100 MG: at 07:52

## 2020-10-26 RX ADMIN — DEXAMETHASONE SODIUM PHOSPHATE 6 MG: 4 INJECTION, SOLUTION INTRAMUSCULAR; INTRAVENOUS at 08:00

## 2020-10-26 RX ADMIN — Medication 5 MG: at 11:04

## 2020-10-26 RX ADMIN — FENTANYL CITRATE 50 MCG: 50 INJECTION, SOLUTION INTRAMUSCULAR; INTRAVENOUS at 11:30

## 2020-10-26 ASSESSMENT — PULMONARY FUNCTION TESTS
PIF_VALUE: 15
PIF_VALUE: 16
PIF_VALUE: 15
PIF_VALUE: 16
PIF_VALUE: 25
PIF_VALUE: 15
PIF_VALUE: 16
PIF_VALUE: 16
PIF_VALUE: 15
PIF_VALUE: 16
PIF_VALUE: 17
PIF_VALUE: 15
PIF_VALUE: 17
PIF_VALUE: 16
PIF_VALUE: 15
PIF_VALUE: 16
PIF_VALUE: 7
PIF_VALUE: 14
PIF_VALUE: 17
PIF_VALUE: 15
PIF_VALUE: 17
PIF_VALUE: 16
PIF_VALUE: 15
PIF_VALUE: 15
PIF_VALUE: 16
PIF_VALUE: 16
PIF_VALUE: 14
PIF_VALUE: 16
PIF_VALUE: 13
PIF_VALUE: 16
PIF_VALUE: 16
PIF_VALUE: 15
PIF_VALUE: 16
PIF_VALUE: 16
PIF_VALUE: 19
PIF_VALUE: 15
PIF_VALUE: 15
PIF_VALUE: 17
PIF_VALUE: 16
PIF_VALUE: 14
PIF_VALUE: 16
PIF_VALUE: 16
PIF_VALUE: 15
PIF_VALUE: 16
PIF_VALUE: 15
PIF_VALUE: 16
PIF_VALUE: 16
PIF_VALUE: 15
PIF_VALUE: 16
PIF_VALUE: 16
PIF_VALUE: 3
PIF_VALUE: 17
PIF_VALUE: 16
PIF_VALUE: 17
PIF_VALUE: 15
PIF_VALUE: 4
PIF_VALUE: 17
PIF_VALUE: 16
PIF_VALUE: 18
PIF_VALUE: 17
PIF_VALUE: 15
PIF_VALUE: 15
PIF_VALUE: 16
PIF_VALUE: 14
PIF_VALUE: 16
PIF_VALUE: 15
PIF_VALUE: 16
PIF_VALUE: 15
PIF_VALUE: 16
PIF_VALUE: 16
PIF_VALUE: 15
PIF_VALUE: 15
PIF_VALUE: 1
PIF_VALUE: 16
PIF_VALUE: 16
PIF_VALUE: 0
PIF_VALUE: 15
PIF_VALUE: 16
PIF_VALUE: 0
PIF_VALUE: 16
PIF_VALUE: 15
PIF_VALUE: 16
PIF_VALUE: 16
PIF_VALUE: 15
PIF_VALUE: 16
PIF_VALUE: 16
PIF_VALUE: 19
PIF_VALUE: 16
PIF_VALUE: 15
PIF_VALUE: 16
PIF_VALUE: 16
PIF_VALUE: 0
PIF_VALUE: 5
PIF_VALUE: 16
PIF_VALUE: 15
PIF_VALUE: 16
PIF_VALUE: 5
PIF_VALUE: 16
PIF_VALUE: 15
PIF_VALUE: 16
PIF_VALUE: 0
PIF_VALUE: 16
PIF_VALUE: 15
PIF_VALUE: 16
PIF_VALUE: 15
PIF_VALUE: 16
PIF_VALUE: 22
PIF_VALUE: 17
PIF_VALUE: 14
PIF_VALUE: 16
PIF_VALUE: 17
PIF_VALUE: 16
PIF_VALUE: 15
PIF_VALUE: 16
PIF_VALUE: 15
PIF_VALUE: 16
PIF_VALUE: 13
PIF_VALUE: 19
PIF_VALUE: 17
PIF_VALUE: 16
PIF_VALUE: 15
PIF_VALUE: 14
PIF_VALUE: 16
PIF_VALUE: 2
PIF_VALUE: 15
PIF_VALUE: 16
PIF_VALUE: 15
PIF_VALUE: 15
PIF_VALUE: 16
PIF_VALUE: 14
PIF_VALUE: 17
PIF_VALUE: 16
PIF_VALUE: 15
PIF_VALUE: 16
PIF_VALUE: 17
PIF_VALUE: 15
PIF_VALUE: 16
PIF_VALUE: 15
PIF_VALUE: 17
PIF_VALUE: 15
PIF_VALUE: 14
PIF_VALUE: 14
PIF_VALUE: 16
PIF_VALUE: 17
PIF_VALUE: 16
PIF_VALUE: 15
PIF_VALUE: 16
PIF_VALUE: 15
PIF_VALUE: 16
PIF_VALUE: 0
PIF_VALUE: 15
PIF_VALUE: 16
PIF_VALUE: 15
PIF_VALUE: 15
PIF_VALUE: 16
PIF_VALUE: 16
PIF_VALUE: 21
PIF_VALUE: 16
PIF_VALUE: 16
PIF_VALUE: 15
PIF_VALUE: 15
PIF_VALUE: 1
PIF_VALUE: 14
PIF_VALUE: 16
PIF_VALUE: 16
PIF_VALUE: 15
PIF_VALUE: 16
PIF_VALUE: 16
PIF_VALUE: 14
PIF_VALUE: 16
PIF_VALUE: 16
PIF_VALUE: 15
PIF_VALUE: 15
PIF_VALUE: 0
PIF_VALUE: 15
PIF_VALUE: 16

## 2020-10-26 ASSESSMENT — PAIN DESCRIPTION - LOCATION
LOCATION: ABDOMEN

## 2020-10-26 ASSESSMENT — PAIN DESCRIPTION - ORIENTATION
ORIENTATION: LEFT;RIGHT;MID;LOWER
ORIENTATION: LOWER

## 2020-10-26 ASSESSMENT — PAIN - FUNCTIONAL ASSESSMENT
PAIN_FUNCTIONAL_ASSESSMENT: PREVENTS OR INTERFERES SOME ACTIVE ACTIVITIES AND ADLS
PAIN_FUNCTIONAL_ASSESSMENT: ACTIVITIES ARE NOT PREVENTED
PAIN_FUNCTIONAL_ASSESSMENT: 0-10

## 2020-10-26 ASSESSMENT — PAIN DESCRIPTION - PAIN TYPE
TYPE: SURGICAL PAIN

## 2020-10-26 ASSESSMENT — PAIN DESCRIPTION - DESCRIPTORS
DESCRIPTORS: ACHING
DESCRIPTORS: SORE

## 2020-10-26 ASSESSMENT — PAIN DESCRIPTION - ONSET
ONSET: ON-GOING

## 2020-10-26 ASSESSMENT — PAIN DESCRIPTION - PROGRESSION
CLINICAL_PROGRESSION: GRADUALLY IMPROVING
CLINICAL_PROGRESSION: NOT CHANGED

## 2020-10-26 ASSESSMENT — PAIN DESCRIPTION - FREQUENCY
FREQUENCY: CONTINUOUS
FREQUENCY: INTERMITTENT
FREQUENCY: CONTINUOUS

## 2020-10-26 ASSESSMENT — PAIN SCALES - GENERAL
PAINLEVEL_OUTOF10: 4
PAINLEVEL_OUTOF10: 3
PAINLEVEL_OUTOF10: 4
PAINLEVEL_OUTOF10: 7
PAINLEVEL_OUTOF10: 4
PAINLEVEL_OUTOF10: 3
PAINLEVEL_OUTOF10: 7
PAINLEVEL_OUTOF10: 3
PAINLEVEL_OUTOF10: 0
PAINLEVEL_OUTOF10: 7

## 2020-10-26 NOTE — PROGRESS NOTES
Patient arrived to 6E 56 from surgery via bed with transport present. IV of 0.9 NS @ 100 ml/hr infusing with 100 mls infused and 900 mls left in bag. Oriented patient to unit, room, and plan of care.

## 2020-10-26 NOTE — OP NOTE
epinephrine and half percent Marcaine mixture was then injected along the planned incision lines for the breast and abdomen. Markings on each breast with placement of a 10 cm wide inferior pedicle and utilization of a 38 mm template marker for the nipple areolar complex were then performed. The inferior pedicle was then de-epithelialized and elevated using Bovie electrocautery. The excess skin, fat, and breast tissue was then excised from each breast with the above-noted volumes removed. Hemostasis was obtained and wounds were then liberally irrigated with irrisept solution. Closure of the breast incisions was then performed with deep sutures of interrupted 2-0 Vicryl, followed by interrupted deep dermal 3-0 Vicryl, and the skin was then closed with a continuous subcuticular 3-0 Mono Derm Quill suture. Next, attention was turned to the abdomen and incision was made along the lower abdominal incisional marking with dissection taken down to Caleb's fascia and then further dissection using Bovie electrocautery to the anterior rectus sheath. Vessels were clipped with medium vessel clips and then divided. Dissection was continued cephalad along the anterior rectus sheath to the costal margin and xiphoid with circumferential dissection around the umbilicus. Next, wounds were liberally irrigated with irrisept solution after hemostasis was obtained and then the patient was brought into a semirecumbent position and excess skin and fat was excised via a transverse incision across this lower abdominal flap to remove the excess pannus. Bleeding points were controlled with cautery, and the wounds were then liberally irrigated with Aricept solution. Drains were placed using 19 mm channeled Alberto drains, and these were secured with 3-0 nylon sutures.   Closure of the lower abdominal incision was performed using a #2 PDO Quill suture at the level of Caleb's fascia, followed by interrupted 3-0 Vicryl in the deep dermis, and then the skin was closed with a continuous subcuticular 3-0 Mono Derm Quill suture. The umbilicus was brought through a midline stab incision and secured in place with deep sutures of 2-0 Vicryl, followed by a continuous subcuticular 4-0 Monocryl. All incisions of the breast and abdomen were then reinforced with Dermabond and Perinio. Dressings with a surgical bra and an abdominal binder were then placed. The patient was extubated and sent to recovery room in stable condition, she tolerated the procedure well.     Electronically signed by Travon Pérez MD on 10/26/2020 at 11:06 AM
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
## 2020-10-26 NOTE — PROGRESS NOTES
Oriented to sds 20        Refuses flu and pneumonia vaccine. Family updated to stay in room. Informed family to take belonging with them if they leave. Keep nothing of value in room unattended. Medication given back to family. Family is taking them with them. Ipt was asked and agreed to first name and last initial being put on white boards. Allergy and fall risks applied. SCD Applied to patient. Warming blanket applied to patient. Pt denies any abuse or thoughts of suicide.

## 2020-10-26 NOTE — INTERVAL H&P NOTE
Update History & Physical    The patient's History and Physical of October 20, 2020 was reviewed with the patient and I examined the patient. There was no change. The surgical site was confirmed by the patient and me. Plan: The risks, benefits, expected outcome, and alternative to the recommended procedure have been discussed with the patient. Patient understands and wants to proceed with the procedure.      Electronically signed by Dylan Dumas MD on 10/26/2020 at 7:52 AM

## 2020-10-26 NOTE — FLOWSHEET NOTE
10/26/20 0655   Encounter Summary   Services provided to: Patient and family together   Referral/Consult From: [facility] Family members  (sister in law)   Continue Visiting Yes  (10/26 pre surgery)   Complexity of Encounter Low   Length of Encounter 15 minutes   Routine   Type Pre-procedure   Spiritual/Zoroastrian   Type Spiritual support   Pre surgery contact with prayer.

## 2020-10-27 VITALS
RESPIRATION RATE: 16 BRPM | SYSTOLIC BLOOD PRESSURE: 113 MMHG | BODY MASS INDEX: 33.31 KG/M2 | HEART RATE: 65 BPM | DIASTOLIC BLOOD PRESSURE: 64 MMHG | WEIGHT: 219.8 LBS | TEMPERATURE: 98.5 F | OXYGEN SATURATION: 92 % | HEIGHT: 68 IN

## 2020-10-27 PROCEDURE — G0378 HOSPITAL OBSERVATION PER HR: HCPCS

## 2020-10-27 PROCEDURE — 2580000003 HC RX 258: Performed by: SURGERY

## 2020-10-27 PROCEDURE — 99024 POSTOP FOLLOW-UP VISIT: CPT | Performed by: SURGERY

## 2020-10-27 PROCEDURE — 6370000000 HC RX 637 (ALT 250 FOR IP): Performed by: SURGERY

## 2020-10-27 RX ORDER — FUROSEMIDE 40 MG/1
40 TABLET ORAL DAILY PRN
Status: DISCONTINUED | OUTPATIENT
Start: 2020-10-27 | End: 2020-10-27 | Stop reason: HOSPADM

## 2020-10-27 RX ORDER — FLUOXETINE HYDROCHLORIDE 20 MG/1
20 CAPSULE ORAL DAILY
Status: DISCONTINUED | OUTPATIENT
Start: 2020-10-27 | End: 2020-10-27

## 2020-10-27 RX ORDER — TRAZODONE HYDROCHLORIDE 50 MG/1
50 TABLET ORAL NIGHTLY
Status: DISCONTINUED | OUTPATIENT
Start: 2020-10-27 | End: 2020-10-27 | Stop reason: HOSPADM

## 2020-10-27 RX ORDER — FLUOXETINE HYDROCHLORIDE 20 MG/1
20 CAPSULE ORAL NIGHTLY
Status: DISCONTINUED | OUTPATIENT
Start: 2020-10-27 | End: 2020-10-27 | Stop reason: HOSPADM

## 2020-10-27 RX ORDER — FUROSEMIDE 40 MG/1
40 TABLET ORAL DAILY
Status: DISCONTINUED | OUTPATIENT
Start: 2020-10-27 | End: 2020-10-27

## 2020-10-27 RX ORDER — OXYCODONE HYDROCHLORIDE 5 MG/1
5 TABLET ORAL EVERY 4 HOURS PRN
Qty: 30 TABLET | Refills: 0 | Status: SHIPPED | OUTPATIENT
Start: 2020-10-27 | End: 2020-11-03

## 2020-10-27 RX ORDER — METHOCARBAMOL 750 MG/1
750 TABLET, FILM COATED ORAL 3 TIMES DAILY
Qty: 60 TABLET | Refills: 1 | Status: SHIPPED | OUTPATIENT
Start: 2020-10-27 | End: 2020-11-06

## 2020-10-27 RX ADMIN — OXYCODONE HYDROCHLORIDE 10 MG: 5 TABLET ORAL at 03:44

## 2020-10-27 RX ADMIN — SODIUM CHLORIDE: 9 INJECTION, SOLUTION INTRAVENOUS at 04:25

## 2020-10-27 RX ADMIN — OXYCODONE HYDROCHLORIDE 10 MG: 5 TABLET ORAL at 08:37

## 2020-10-27 RX ADMIN — OXYCODONE HYDROCHLORIDE 10 MG: 5 TABLET ORAL at 14:30

## 2020-10-27 ASSESSMENT — PAIN SCALES - GENERAL
PAINLEVEL_OUTOF10: 7
PAINLEVEL_OUTOF10: 6
PAINLEVEL_OUTOF10: 5
PAINLEVEL_OUTOF10: 4
PAINLEVEL_OUTOF10: 4
PAINLEVEL_OUTOF10: 7
PAINLEVEL_OUTOF10: 5
PAINLEVEL_OUTOF10: 3

## 2020-10-27 ASSESSMENT — PAIN - FUNCTIONAL ASSESSMENT: PAIN_FUNCTIONAL_ASSESSMENT: ACTIVITIES ARE NOT PREVENTED

## 2020-10-27 ASSESSMENT — PAIN DESCRIPTION - ONSET: ONSET: ON-GOING

## 2020-10-27 ASSESSMENT — PAIN DESCRIPTION - PROGRESSION: CLINICAL_PROGRESSION: NOT CHANGED

## 2020-10-27 ASSESSMENT — PAIN DESCRIPTION - LOCATION: LOCATION: ABDOMEN

## 2020-10-27 ASSESSMENT — PAIN DESCRIPTION - ORIENTATION: ORIENTATION: LOWER

## 2020-10-27 ASSESSMENT — VISUAL ACUITY: OU: 1

## 2020-10-27 ASSESSMENT — PAIN DESCRIPTION - DESCRIPTORS: DESCRIPTORS: ACHING

## 2020-10-27 ASSESSMENT — PAIN DESCRIPTION - FREQUENCY: FREQUENCY: INTERMITTENT

## 2020-10-27 ASSESSMENT — PAIN DESCRIPTION - PAIN TYPE: TYPE: SURGICAL PAIN

## 2020-10-27 NOTE — DISCHARGE INSTR - DIET

## 2020-10-27 NOTE — PLAN OF CARE
Problem: Pain:  Goal: Pain level will decrease  Description: Pain level will decrease  Outcome: Ongoing  Note: Patient complains of pain of a 5,  taking roxicodone as needed for pain   Goal: Control of acute pain  Description: Control of acute pain  Outcome: Ongoing  Note: Patient complains of pain of a 5,  taking roxicodone as needed for pain   Goal: Control of chronic pain  Description: Control of chronic pain  Outcome: Completed     Problem: Discharge Planning:  Goal: Participates in care planning  Description: Participates in care planning  Outcome: Ongoing  Note: Possible discharge tomorrow,  will monitor for discharge needs   Goal: Discharged to appropriate level of care  Description: Discharged to appropriate level of care  Outcome: Ongoing     Problem: Activity Intolerance:  Goal: Ability to tolerate increased activity will improve  Description: Ability to tolerate increased activity will improve  Outcome: Met This Shift  Note: Patient ambulated in the hallways,  tolerated well      Problem:  Bowel Function - Altered:  Goal: Bowel elimination is within specified parameters  Description: Bowel elimination is within specified parameters  Outcome: Ongoing  Note: Patient has not had a bowel movement since surgery earlier today      Problem: Mobility - Impaired:  Goal: Mobility will improve to maximum level  Description: Mobility will improve to maximum level  Outcome: Ongoing  Note: Patient ambulated in the hallways,  tolerated well      Problem: Pain:  Goal: Pain level will decrease  Description: Pain level will decrease  Outcome: Ongoing  Note: Patient complains of pain of a 5,  taking roxicodone as needed for pain   Goal: Ability to notify healthcare provider of pain before it becomes unmanageable or unbearable will improve  Description: Ability to notify healthcare provider of pain before it becomes unmanageable or unbearable will improve  Outcome: Met This Shift  Goal: Control of acute pain  Description: Control of acute pain  Outcome: Ongoing  Note: Patient complains of pain of a 5,  taking roxicodone as needed for pain   Goal: Control of chronic pain  Description: Control of chronic pain  Outcome: Completed     Problem: Skin Integrity - Impaired:  Goal: Will show no infection signs and symptoms  Description: Will show no infection signs and symptoms  Outcome: Ongoing  Note: Patient has surgical incision in bilateral breasts and lower abdomen with no signs of infection at this time   Goal: Absence of new skin breakdown  Description: Absence of new skin breakdown  Outcome: Met This Shift  Note: Patient is not having any skin breakdown during this stay    Care plan reviewed with patient. Patient  verbalize understanding of the plan of care and contribute to goal setting.

## 2020-10-27 NOTE — PROGRESS NOTES
Plastic Surgery Progress Note    Patient:  Adriano Mortensen      MLTN/QVK:[id number]    YOB: 1955    MRN: [id number]       Acct: [de-identified]     PCP: Roxy Morgan MD    Date of Admission: 10/26/2020    Assessment/Plan:    1. POD#1 from bilateral reduciton mammoplasty and panniculectomy. Stable postoperative course. She has been ambulatory and she is tolerating po without difficulty. Discussed intraoperative findings with her today. She is stable for discharge home. Expected discharge date:  10/27/20    Hospital Course: stable postoperative course     Subjective (past 24 hours): pain well controlled, no fever, chills, nausea, vomiting. She is up in chair and has been walking. Medications:  Reviewed    Infusion Medications    sodium chloride 50 mL/hr at 10/27/20 1033     Scheduled Medications    traZODone  50 mg Oral Nightly    FLUoxetine  20 mg Oral Nightly    sodium chloride flush  10 mL Intravenous 2 times per day     PRN Meds: furosemide, sodium chloride flush, promethazine **OR** ondansetron, oxyCODONE **OR** oxyCODONE, morphine **OR** morphine, acetaminophen      Intake/Output Summary (Last 24 hours) at 10/27/2020 1221  Last data filed at 10/27/2020 0431  Gross per 24 hour   Intake 2650 ml   Output 1305 ml   Net 1345 ml       Diet:  DIET CARB CONTROL; Exam:  /64   Pulse 65   Temp 98.5 °F (36.9 °C) (Oral)   Resp 16   Ht 5' 8\" (1.727 m)   Wt 219 lb 12.8 oz (99.7 kg)   SpO2 92%   BMI 33.42 kg/m²   Physical Exam  Vitals signs and nursing note reviewed. Exam conducted with a chaperone present. Constitutional:       General: She is awake. She is not in acute distress. Appearance: Normal appearance. She is well-developed and well-groomed. HENT:      Head: Normocephalic and atraumatic. Jaw: There is normal jaw occlusion.       Right Ear: Hearing and external ear normal.      Left Ear: Hearing and external ear normal.      Nose: Nose normal. Mouth/Throat:      Lips: Pink. Mouth: Mucous membranes are moist.      Pharynx: Oropharynx is clear. Eyes:      General: Lids are normal. Vision grossly intact. Right eye: No discharge. Left eye: No discharge. Extraocular Movements: Extraocular movements intact. Conjunctiva/sclera: Conjunctivae normal.      Pupils: Pupils are equal, round, and reactive to light. Neck:      Musculoskeletal: Full passive range of motion without pain, normal range of motion and neck supple. Thyroid: No thyromegaly. Trachea: Trachea and phonation normal. No tracheal deviation. Cardiovascular:      Rate and Rhythm: Normal rate and regular rhythm. Pulses: Normal pulses. No decreased pulses. Pulmonary:      Effort: Pulmonary effort is normal. No respiratory distress. Chest:       Abdominal:      General: Abdomen is flat. Bowel sounds are normal. There is no distension. Palpations: Abdomen is soft. Tenderness: There is no abdominal tenderness. Comments: Drain output is 155cc total since surgery   Musculoskeletal: Normal range of motion. General: No deformity. Skin:     General: Skin is warm. Capillary Refill: Capillary refill takes 2 to 3 seconds. Findings: No erythema or rash. Neurological:      General: No focal deficit present. Mental Status: She is alert and oriented to person, place, and time. Mental status is at baseline. Sensory: No sensory deficit. Psychiatric:         Mood and Affect: Mood normal.         Behavior: Behavior normal. Behavior is cooperative. Thought Content: Thought content normal.         Judgment: Judgment normal.            Labs:   No results for input(s): WBC, HGB, HCT, PLT in the last 72 hours. Recent Labs     10/26/20  0709   K 4.9     No results for input(s): AST, ALT, BILIDIR, BILITOT, ALKPHOS in the last 72 hours. No results for input(s): INR in the last 72 hours.   No results for input(s): Johana Lowers in the last 72 hours.     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

## 2020-10-27 NOTE — CARE COORDINATION
DISCHARGE PLANNING EVALUATION: OP/OBSERVATION        10/27/20, 8:78 PM EDT    Jessie Martinez       Admitted from:[facility]ACU 10/26/2020/ 0557   Location: [address] [place] [address] Reason for admit: S/P panniculectomy [Z98.890]  Macromastia [N62]   Admit order signed?: yes    Procedure: 10/26/20 surgery by Dr Briana Hernandez: BREAST MAMMOPLASTY REDUCTION  PANNICULECTOMY   Pertinent Info/Orders/Treatment Plan:  Dr Briana Hernandez attending. Ambulate in hallway. Pain management. I&O. Burton catheter out today. Monitor drain output. Abdominal binder. PCP: Red Cao MD    Patient Goals/Plan/Treatment Preferences: I spoke with Hasmukh Rod. She lives alone and is planning to go home today. She will manage her wound drain. Declined HH. No other needs voiced. Transportation/Food Security/Housekeeping Addressed:  No issues identified. 10/27/20, 3:11 PM EDT    Patient goals/plan/ treatment preferences discussed by  and . Patient goals/plan/ treatment preferences reviewed with patient/ family. Patient/ family verbalize understanding of discharge plan and are in agreement with goal/plan/treatment preferences. Understanding was demonstrated using the teach back method. AVS provided by RN at time of discharge, which includes all necessary medical information pertaining to the patients current course of illness, treatment, post-discharge goals of care, and treatment preferences.

## 2020-10-27 NOTE — PROGRESS NOTES
Discharge teaching and instructions for diagnosis/procedure of panniculectomy completed with patient using teachback method. AVS reviewed. Patient voiced understanding regarding prescriptions, follow up appointments, and care of self at home.  Discharged in a wheelchair to  home with support per family

## 2020-10-27 NOTE — DISCHARGE SUMMARY
Hospital Medicine Discharge Summary      Patient Identification:   Rocio Lynch   : 1955  MRN: [id number]   Account: [de-identified]      Patient's PCP: Carola Bryant MD    Admit Date: 10/26/2020     Discharge Date:   10/27/20    Admitting Physician: Lyndsey Green MD     Discharge Physician: Lyndsey Green MD     Discharge Diagnoses: Active Hospital Problems    Diagnosis Date Noted    S/P panniculectomy [Z98.890] 10/26/2020    Macromastia Fidel Sary 10/26/2020       The patient was seen and examined on day of discharge and this discharge summary is in conjunction with any daily progress note from day of discharge. Hospital Course:   Rocio Lynch is a 60 y.o. female admitted to [facility] on 10/26/2020 for panniculectomy and bilateral reduction mammoplasty. Postoperatively his course was uneventful. Exam:     Vitals:  Vitals:    10/26/20 1925 10/27/20 0340 10/27/20 0830 10/27/20 1145   BP: (!) 93/57 101/71 109/62 113/64   Pulse: 72 66 65 65   Resp: 16 16 16 16   Temp: 98.4 °F (36.9 °C) 98.2 °F (36.8 °C) 98.6 °F (37 °C) 98.5 °F (36.9 °C)   TempSrc: Oral Oral Oral Oral   SpO2: 91% 94% 95% 92%   Weight:       Height:         Weight: Weight: 219 lb 12.8 oz (99.7 kg)     24 hour intake/output:    Intake/Output Summary (Last 24 hours) at 10/27/2020 1231  Last data filed at 10/27/2020 0431  Gross per 24 hour   Intake 2150 ml   Output 1305 ml   Net 845 ml         General appearance:  No apparent distress, appears stated age and cooperative. HEENT:  Normal cephalic, atraumatic without obvious deformity. Pupils equal, round, and reactive to light. Extra ocular muscles intact. Conjunctivae/corneas clear. Neck: Supple, with full range of motion. No jugular venous distention. Trachea midline. Respiratory:  Normal respiratory effort. Clear to auscultation, bilaterally without Rales/Wheezes/Rhonchi.   Cardiovascular:  Regular rate and rhythm with normal S1/S2 without murmurs, rubs or gallops. Abdomen: Soft, non-tender, non-distended with normal bowel sounds. Musculoskeletal:  No clubbing, cyanosis or edema bilaterally. Full range of motion without deformity. Skin: Skin color, texture, turgor normal.  No rashes or lesions. Neurologic:  Neurovascularly intact without any focal sensory/motor deficits. Cranial nerves: II-XII intact, grossly non-focal.  Psychiatric:  Alert and oriented, thought content appropriate, normal insight  Capillary Refill: Brisk,< 3 seconds   Peripheral Pulses: +2 palpable, equal bilaterally       Labs: For convenience and continuity at follow-up the following most recent labs are provided:      CBC:    Lab Results   Component Value Date    WBC 6.1 10/19/2020    HGB 11.5 10/19/2020    HCT 39.8 10/19/2020     10/19/2020       Renal:    Lab Results   Component Value Date     10/19/2020    K 4.9 10/26/2020     10/19/2020    CO2 27 10/19/2020    BUN 18 10/19/2020    CREATININE 0.7 10/19/2020    CALCIUM 9.5 10/19/2020    PHOS 4.5 01/25/2011         Significant Diagnostic Studies    Radiology:   No orders to display          Consults:     None    Disposition: Home  Condition at Discharge: Stable    Code Status:  Full Code     Patient Instructions:    Discharge lab work: none  Activity: no heavy lifting for 4 weeks  Diet: DIET CARB CONTROL; Follow-up visits:   No follow-up provider specified.        Discharge Medications:      Trini Colon   Home Medication Instructions ERIC:[id number]    Printed on:10/27/20 1231   Medication Information                      FLUoxetine (PROZAC) 20 MG capsule  Take 2 capsules by mouth daily             furosemide (LASIX) 40 MG tablet  Take 40 mg by mouth daily as needed             ibuprofen (ADVIL;MOTRIN) 200 MG tablet  Take 200 mg by mouth every 6 hours as needed for Pain             methocarbamol (ROBAXIN-750) 750 MG tablet  Take 1 tablet by mouth 3 times daily for 10 days             oxyCODONE (ROXICODONE) 5 MG immediate release tablet  Take 1 tablet by mouth every 4 hours as needed for Pain for up to 7 days. traZODone (DESYREL) 50 MG tablet  Take 1 tablet by mouth nightly as needed for Sleep             vitamin D (ERGOCALCIFEROL) 1.25 MG (07268 UT) CAPS capsule  TAKE 1 CAPSULE BY MOUTH TWICE A WEEK                 Time Spent on discharge is more than 15 minutes in the examination, evaluation, counseling and review of medications and discharge plan. Signed: Thank you Tricia Hodge MD for the opportunity to be involved in this patient's care.     Electronically signed by Kimmie Colmenares MD on 10/27/2020 at 12:31 PM

## 2020-10-29 ENCOUNTER — TELEPHONE (OUTPATIENT)
Dept: FAMILY MEDICINE CLINIC | Age: 65
End: 2020-10-29

## 2020-11-03 ENCOUNTER — OFFICE VISIT (OUTPATIENT)
Dept: SURGERY | Age: 65
End: 2020-11-03

## 2020-11-03 VITALS — DIASTOLIC BLOOD PRESSURE: 78 MMHG | HEART RATE: 79 BPM | TEMPERATURE: 96.8 F | SYSTOLIC BLOOD PRESSURE: 121 MMHG

## 2020-11-03 PROCEDURE — 99024 POSTOP FOLLOW-UP VISIT: CPT | Performed by: SURGERY

## 2020-11-03 ASSESSMENT — ENCOUNTER SYMPTOMS
SINUS PRESSURE: 0
COLOR CHANGE: 0
TROUBLE SWALLOWING: 0
SINUS PAIN: 0
ABDOMINAL PAIN: 0
COUGH: 0
SHORTNESS OF BREATH: 0
FACIAL SWELLING: 0
PHOTOPHOBIA: 0

## 2020-11-03 ASSESSMENT — VISUAL ACUITY: OU: 1

## 2020-11-03 NOTE — PROGRESS NOTES
Subjective:      Patient ID: Dasha Banda is a 60 y.o. female. Justo Parikh returns to the office today 1 week out from bilateral reduction mammoplasty and panniculectomy. Overall she is doing very well and has no complaints, she states she is very happy with the shape and appearance of her abdomen and her breasts. Her drain output is noted as she presents with the record, and the left drain is removed. She has no complaints of fever, chills, nausea, vomiting, constipation. She states her pain control is excellent with the p.o. pain medications prescribed postoperatively. Review of Systems   Constitutional: Negative for unexpected weight change. HENT: Negative for dental problem, facial swelling, nosebleeds, sinus pressure, sinus pain and trouble swallowing. Eyes: Negative for photophobia and visual disturbance. Respiratory: Negative for cough and shortness of breath. Cardiovascular: Negative for chest pain and leg swelling. Gastrointestinal: Negative for abdominal pain. Endocrine: Negative for cold intolerance and heat intolerance. Musculoskeletal: Negative for neck pain and neck stiffness. Skin: Negative for color change, pallor, rash and wound. Neurological: Negative for dizziness, facial asymmetry, light-headedness, numbness and headaches. Hematological: Does not bruise/bleed easily. Objective:   Physical Exam  Vitals signs and nursing note reviewed. Exam conducted with a chaperone present. Constitutional:       General: She is not in acute distress. Appearance: Normal appearance. HENT:      Head: Normocephalic and atraumatic. Jaw: There is normal jaw occlusion. Right Ear: External ear normal.      Left Ear: External ear normal.      Nose: Nose normal.      Mouth/Throat:      Mouth: Mucous membranes are moist.      Pharynx: Oropharynx is clear. Eyes:      General: Lids are normal. Vision grossly intact. Right eye: No discharge.          Left eye: No discharge. Extraocular Movements: Extraocular movements intact. Conjunctiva/sclera: Conjunctivae normal.      Pupils: Pupils are equal, round, and reactive to light. Neck:      Musculoskeletal: Full passive range of motion without pain, normal range of motion and neck supple. Thyroid: No thyromegaly. Trachea: Trachea and phonation normal. No tracheal deviation. Cardiovascular:      Rate and Rhythm: Normal rate. Pulses: Normal pulses. No decreased pulses. Pulmonary:      Effort: Pulmonary effort is normal. No respiratory distress. Chest:       Abdominal:      General: Abdomen is flat. There is no distension. Palpations: Abdomen is soft. Tenderness: There is no abdominal tenderness. Comments: Drain on the left mons is removed without difficulty. Drain output from the right side is still greater than 50 cc/day. Musculoskeletal: Normal range of motion. General: No deformity. Skin:     General: Skin is warm. Capillary Refill: Capillary refill takes 2 to 3 seconds. Findings: No erythema or rash. Neurological:      General: No focal deficit present. Mental Status: She is alert and oriented to person, place, and time. Mental status is at baseline. Sensory: No sensory deficit. Psychiatric:         Mood and Affect: Mood normal.         Behavior: Behavior normal.         Thought Content: Thought content normal.         Judgment: Judgment normal.         Assessment:      Postop check following bilateral reduction mammoplasty and panniculectomy. Overall she is doing very well.  1 drain was removed, and the other will remain until next week. Plan:      Continue abdominal binder support. Follow-up in 1 week.         Romulo Scanlon MD

## 2020-11-10 RX ORDER — TRAZODONE HYDROCHLORIDE 50 MG/1
TABLET ORAL
Qty: 30 TABLET | Refills: 5 | Status: SHIPPED | OUTPATIENT
Start: 2020-11-10 | End: 2021-03-08

## 2020-11-10 NOTE — TELEPHONE ENCOUNTER
[facility]   Last seen 10/7/20  Next appt no future appt  Order pending #90/1  Last rx 9/9/20 #30/1

## 2020-11-11 ENCOUNTER — OFFICE VISIT (OUTPATIENT)
Dept: SURGERY | Age: 65
End: 2020-11-11

## 2020-11-11 VITALS
HEIGHT: 68 IN | DIASTOLIC BLOOD PRESSURE: 69 MMHG | WEIGHT: 205 LBS | TEMPERATURE: 97.1 F | HEART RATE: 72 BPM | BODY MASS INDEX: 31.07 KG/M2 | SYSTOLIC BLOOD PRESSURE: 106 MMHG

## 2020-11-11 DIAGNOSIS — N62 MACROMASTIA: Primary | ICD-10-CM

## 2020-11-11 PROCEDURE — 99024 POSTOP FOLLOW-UP VISIT: CPT | Performed by: SURGERY

## 2020-11-17 ENCOUNTER — OFFICE VISIT (OUTPATIENT)
Dept: SURGERY | Age: 65
End: 2020-11-17

## 2020-11-17 VITALS
TEMPERATURE: 96.8 F | WEIGHT: 209.6 LBS | SYSTOLIC BLOOD PRESSURE: 103 MMHG | HEART RATE: 67 BPM | BODY MASS INDEX: 31.87 KG/M2 | DIASTOLIC BLOOD PRESSURE: 68 MMHG

## 2020-11-17 PROCEDURE — 99024 POSTOP FOLLOW-UP VISIT: CPT | Performed by: SURGERY

## 2020-11-17 ASSESSMENT — ENCOUNTER SYMPTOMS
PHOTOPHOBIA: 0
COLOR CHANGE: 0
SINUS PRESSURE: 0
SINUS PAIN: 0
SHORTNESS OF BREATH: 0
FACIAL SWELLING: 0
COUGH: 0
ABDOMINAL PAIN: 0
TROUBLE SWALLOWING: 0

## 2020-11-17 NOTE — PROGRESS NOTES
Subjective:      Patient ID: Ramin White is a 60 y.o. female. Bonnetta Brittle returns to the office today 3 weeks follow-up from bilateral breast reduction and panniculectomy. Overall she feels very good, has no complaints of pain, tenderness, drainage, or redness. She is happy with size, shape, and appearance of her breast and her abdomen. Review of Systems   Constitutional: Negative for unexpected weight change. HENT: Negative for dental problem, facial swelling, nosebleeds, sinus pressure, sinus pain and trouble swallowing. Eyes: Negative for photophobia and visual disturbance. Respiratory: Negative for cough and shortness of breath. Cardiovascular: Negative for chest pain and leg swelling. Gastrointestinal: Negative for abdominal pain. Endocrine: Negative for cold intolerance and heat intolerance. Musculoskeletal: Negative for neck pain and neck stiffness. Skin: Negative for color change, pallor, rash and wound. Neurological: Negative for dizziness, facial asymmetry, light-headedness, numbness and headaches. Hematological: Does not bruise/bleed easily. Objective:   Physical Exam  Vitals signs and nursing note reviewed. Exam conducted with a chaperone present. Constitutional:       General: She is awake. She is not in acute distress. Appearance: Normal appearance. HENT:      Head: Normocephalic and atraumatic. Right Ear: External ear normal.      Left Ear: External ear normal.      Nose: Nose normal.      Mouth/Throat:      Mouth: Mucous membranes are moist.   Eyes:      General:         Right eye: No discharge. Left eye: No discharge. Extraocular Movements: Extraocular movements intact. Conjunctiva/sclera: Conjunctivae normal.      Pupils: Pupils are equal, round, and reactive to light. Neck:      Musculoskeletal: Normal range of motion and neck supple. Thyroid: No thyromegaly. Trachea: No tracheal deviation.    Cardiovascular:      Rate and Rhythm: Normal rate. Pulses: Normal pulses. No decreased pulses. Pulmonary:      Effort: Pulmonary effort is normal. No respiratory distress. Chest:      Breasts: Taqueria Score is 5. Right: No swelling, bleeding, inverted nipple or tenderness. Left: No swelling, bleeding, inverted nipple or tenderness. Abdominal:      General: Abdomen is flat. Bowel sounds are normal. There is no distension. Palpations: Abdomen is soft. Tenderness: There is no abdominal tenderness. Musculoskeletal: Normal range of motion. General: No deformity. Lymphadenopathy:      Upper Body:      Right upper body: No supraclavicular, axillary or pectoral adenopathy. Left upper body: No supraclavicular, axillary or pectoral adenopathy. Skin:     General: Skin is warm. Capillary Refill: Capillary refill takes 2 to 3 seconds. Findings: No erythema or rash. Neurological:      General: No focal deficit present. Mental Status: She is alert and oriented to person, place, and time. Mental status is at baseline. Sensory: No sensory deficit. Psychiatric:         Mood and Affect: Mood normal.         Behavior: Behavior normal. Behavior is cooperative. Thought Content: Thought content normal.         Judgment: Judgment normal.         Assessment:      Postop check from bilateral reduction mammoplasty and panniculectomy, she is doing very well and is very happy with the results. Plan:      Continue with the abdominal binder for another 2 weeks, she can convert to a sports bra of her choosing. She can slowly resume normal activities. Follow-up in 3 weeks.         Quin Schwarz MD

## 2020-11-20 ENCOUNTER — OFFICE VISIT (OUTPATIENT)
Dept: SURGERY | Age: 65
End: 2020-11-20

## 2020-11-20 VITALS
DIASTOLIC BLOOD PRESSURE: 79 MMHG | WEIGHT: 206.2 LBS | TEMPERATURE: 97 F | BODY MASS INDEX: 31.35 KG/M2 | SYSTOLIC BLOOD PRESSURE: 125 MMHG | HEART RATE: 79 BPM

## 2020-11-20 PROCEDURE — 99024 POSTOP FOLLOW-UP VISIT: CPT | Performed by: SURGERY

## 2020-11-20 ASSESSMENT — ENCOUNTER SYMPTOMS
COUGH: 0
FACIAL SWELLING: 0
ABDOMINAL PAIN: 0
SINUS PRESSURE: 0
TROUBLE SWALLOWING: 0
SHORTNESS OF BREATH: 0
SINUS PAIN: 0
COLOR CHANGE: 0
PHOTOPHOBIA: 0

## 2020-11-20 NOTE — PROGRESS NOTES
Subjective:      Patient ID: David Wooten is a 60 y.o. female. Cony Marinelli returns to the office today 3 weeks follow-up from bilateral reduction mammoplasty and panniculectomy. She is very happy with the size shape and appearance of her breast and abdomen. She does notice significant improvement in the upper back, neck, shoulder and arm pain that were related to the macromastia. She continues to wear her abdominal binder      Review of Systems   Constitutional: Negative for unexpected weight change. HENT: Negative for dental problem, facial swelling, nosebleeds, sinus pressure, sinus pain and trouble swallowing. Eyes: Negative for photophobia and visual disturbance. Respiratory: Negative for cough and shortness of breath. Cardiovascular: Negative for chest pain and leg swelling. Gastrointestinal: Negative for abdominal pain. Endocrine: Negative for cold intolerance and heat intolerance. Musculoskeletal: Negative for neck pain and neck stiffness. Skin: Negative for color change, pallor, rash and wound. Neurological: Negative for dizziness, facial asymmetry, light-headedness, numbness and headaches. Hematological: Does not bruise/bleed easily. Objective:   Physical Exam  Vitals signs and nursing note reviewed. Exam conducted with a chaperone present. Constitutional:       General: She is awake. She is not in acute distress. Appearance: Normal appearance. HENT:      Head: Normocephalic. Jaw: There is normal jaw occlusion. Right Ear: External ear normal.      Left Ear: External ear normal.      Nose: Nose normal.      Mouth/Throat:      Mouth: Mucous membranes are moist.      Pharynx: Oropharynx is clear. Eyes:      General:         Right eye: No discharge. Left eye: No discharge. Extraocular Movements: Extraocular movements intact. Conjunctiva/sclera: Conjunctivae normal.      Pupils: Pupils are equal, round, and reactive to light.    Neck: Musculoskeletal: Normal range of motion and neck supple. Thyroid: No thyromegaly. Trachea: No tracheal deviation. Cardiovascular:      Rate and Rhythm: Normal rate. Pulses: Normal pulses. No decreased pulses. Pulmonary:      Effort: Pulmonary effort is normal. No respiratory distress. Chest:       Abdominal:      General: Abdomen is flat. There is no distension. Palpations: Abdomen is soft. Tenderness: There is no abdominal tenderness. Musculoskeletal: Normal range of motion. General: No deformity. Skin:     General: Skin is warm. Capillary Refill: Capillary refill takes 2 to 3 seconds. Findings: No erythema or rash. Neurological:      General: No focal deficit present. Mental Status: She is alert and oriented to person, place, and time. Mental status is at baseline. Sensory: No sensory deficit. Psychiatric:         Mood and Affect: Mood normal.         Behavior: Behavior normal. Behavior is cooperative. Thought Content: Thought content normal.         Judgment: Judgment normal.         Assessment:      Postop check following panniculectomy and bilateral reduction mammoplasty. She is doing very well and is very happy with her appearance. She notes significant improvement in the back neck and shoulder pain issues related to the macromastia. Plan:      Continued use of the abdominal support binder for another week. She can gradually resume most activities. Follow-up in 3 weeks.         Dale Varma MD

## 2021-01-05 ENCOUNTER — TELEPHONE (OUTPATIENT)
Dept: FAMILY MEDICINE CLINIC | Age: 66
End: 2021-01-05

## 2021-01-05 ENCOUNTER — HOSPITAL ENCOUNTER (OUTPATIENT)
Age: 66
Discharge: HOME | End: 2021-01-05
Payer: COMMERCIAL

## 2021-01-05 DIAGNOSIS — D64.9 LOW HEMOGLOBIN: Primary | ICD-10-CM

## 2021-01-05 LAB
ALBUMIN SERPL-MCNC: 4.1 G/DL (ref 3.5–5.1)
ALP BLD-CCNC: 96 U/L (ref 38–126)
ALT SERPL-CCNC: 11 U/L (ref 11–66)
ANION GAP SERPL CALCULATED.3IONS-SCNC: 11 MEQ/L (ref 8–16)
AST SERPL-CCNC: 17 U/L (ref 5–40)
BILIRUB SERPL-MCNC: 0.6 MG/DL (ref 0.3–1.2)
BUN BLDV-MCNC: 10 MG/DL (ref 7–22)
CALCIUM SERPL-MCNC: 9.1 MG/DL (ref 8.5–10.5)
CHLORIDE BLD-SCNC: 108 MEQ/L (ref 98–111)
CO2: 25 MEQ/L (ref 23–33)
CREAT SERPL-MCNC: 0.6 MG/DL (ref 0.4–1.2)
EKG ATRIAL RATE: 64 BPM
EKG P AXIS: 0 DEGREES
EKG P-R INTERVAL: 172 MS
EKG Q-T INTERVAL: 434 MS
EKG QRS DURATION: 84 MS
EKG QTC CALCULATION (BAZETT): 447 MS
EKG R AXIS: 31 DEGREES
EKG T AXIS: 28 DEGREES
EKG VENTRICULAR RATE: 64 BPM
ERYTHROCYTE [DISTWIDTH] IN BLOOD BY AUTOMATED COUNT: 16.6 % (ref 11.5–14.5)
ERYTHROCYTE [DISTWIDTH] IN BLOOD BY AUTOMATED COUNT: 48 FL (ref 35–45)
FERRITIN: 10 NG/ML (ref 10–291)
GFR SERPL CREATININE-BSD FRML MDRD: > 90 ML/MIN/1.73M2
GLUCOSE BLD-MCNC: 90 MG/DL (ref 70–108)
HCT VFR BLD CALC: 30.9 % (ref 37–47)
HEMOGLOBIN: 8.5 GM/DL (ref 12–16)
IRON: 20 UG/DL (ref 50–170)
MCH RBC QN AUTO: 22 PG (ref 26–33)
MCHC RBC AUTO-ENTMCNC: 27.5 GM/DL (ref 32.2–35.5)
MCV RBC AUTO: 80.1 FL (ref 81–99)
MRSA SCREEN RT-PCR: NEGATIVE
PLATELET # BLD: 242 THOU/MM3 (ref 130–400)
PMV BLD AUTO: 10.6 FL (ref 9.4–12.4)
POTASSIUM SERPL-SCNC: 3.8 MEQ/L (ref 3.5–5.2)
RBC # BLD: 3.86 MILL/MM3 (ref 4.2–5.4)
SODIUM BLD-SCNC: 144 MEQ/L (ref 135–145)
TOTAL PROTEIN: 6.4 G/DL (ref 6.1–8)
VITAMIN B-12: 259 PG/ML (ref 211–911)
WBC # BLD: 4.2 THOU/MM3 (ref 4.8–10.8)

## 2021-01-05 PROCEDURE — 36415 COLL VENOUS BLD VENIPUNCTURE: CPT

## 2021-01-05 PROCEDURE — 82728 ASSAY OF FERRITIN: CPT

## 2021-01-05 PROCEDURE — 82607 VITAMIN B-12: CPT

## 2021-01-05 PROCEDURE — 87641 MR-STAPH DNA AMP PROBE: CPT

## 2021-01-05 PROCEDURE — 93005 ELECTROCARDIOGRAM TRACING: CPT | Performed by: ORTHOPAEDIC SURGERY

## 2021-01-05 PROCEDURE — 83540 ASSAY OF IRON: CPT

## 2021-01-05 PROCEDURE — 80053 COMPREHEN METABOLIC PANEL: CPT

## 2021-01-05 PROCEDURE — 85027 COMPLETE CBC AUTOMATED: CPT

## 2021-01-06 ENCOUNTER — TELEPHONE (OUTPATIENT)
Dept: FAMILY MEDICINE CLINIC | Age: 66
End: 2021-01-06

## 2021-01-06 DIAGNOSIS — D50.9 IRON DEFICIENCY ANEMIA, UNSPECIFIED IRON DEFICIENCY ANEMIA TYPE: Primary | ICD-10-CM

## 2021-01-06 RX ORDER — FERROUS SULFATE 325(65) MG
325 TABLET ORAL
COMMUNITY

## 2021-01-06 NOTE — TELEPHONE ENCOUNTER
----- Message from Rolan Welch MD sent at 1/5/2021  9:20 PM EST -----  Iron level low. See is she's able to tolerate and absorb oral iron. If so, ok for Ferrous Sulfate 325mg po bid #60/2. Recheck CBC, iron level in 6 weeks.  CG
LVM for pt to call back about labs.  Please see below message
Pt notified. She states that she has never taken oral iron so isn't sure if she is able to tolerate it or not but is willing to try. Pt will get it OTC. Med list updated. Lab orders mailed to the pt.
APTT 134
appt 121.5

## 2021-01-20 ENCOUNTER — OFFICE VISIT (OUTPATIENT)
Dept: SURGERY | Age: 66
End: 2021-01-20

## 2021-01-20 VITALS
HEIGHT: 68 IN | SYSTOLIC BLOOD PRESSURE: 106 MMHG | HEART RATE: 72 BPM | WEIGHT: 202 LBS | DIASTOLIC BLOOD PRESSURE: 66 MMHG | TEMPERATURE: 97.4 F | BODY MASS INDEX: 30.62 KG/M2

## 2021-01-20 DIAGNOSIS — E65 PANNUS, ABDOMINAL: Primary | ICD-10-CM

## 2021-01-20 DIAGNOSIS — N62 MACROMASTIA: ICD-10-CM

## 2021-01-20 DIAGNOSIS — Z09 POSTOP CHECK: ICD-10-CM

## 2021-01-20 PROCEDURE — 99024 POSTOP FOLLOW-UP VISIT: CPT | Performed by: SURGERY

## 2021-01-20 ASSESSMENT — ENCOUNTER SYMPTOMS
COLOR CHANGE: 0
SINUS PRESSURE: 0
TROUBLE SWALLOWING: 0
FACIAL SWELLING: 0
SINUS PAIN: 0
PHOTOPHOBIA: 0
COUGH: 0
SHORTNESS OF BREATH: 0
ABDOMINAL PAIN: 0

## 2021-01-20 ASSESSMENT — VISUAL ACUITY: OU: 1

## 2021-01-20 NOTE — PROGRESS NOTES
Lc Stuart (:  1955) is a 60 y.o. female,Established patient, here for evaluation of the following chief complaint(s):  Post-Op Check [facility] 10/26 panniculectomy shania breast reduction)      ASSESSMENT/PLAN:  Follow-up check from bilateral reduction mammoplasty and panniculectomy. She is doing very well and very happy with the results. She can be seen back in the office at her convenience. No follow-ups on file. SUBJECTIVE/OBJECTIVE:  Barb Paz returns to the office 3 months follow-up from bilateral reduction mammoplasty and panniculectomy. She is very happy with the appearance of her abdomen as well as the size, shape, appearance of her breasts. She does note significant improvement in the upper back, neck, and shoulder pain issues related to the macromastia. Review of Systems   Constitutional: Negative for activity change, appetite change, fatigue, fever and unexpected weight change. HENT: Negative for dental problem, facial swelling, nosebleeds, sinus pressure, sinus pain and trouble swallowing. Eyes: Negative for photophobia and visual disturbance. Respiratory: Negative for cough and shortness of breath. Cardiovascular: Negative for chest pain and leg swelling. Gastrointestinal: Negative for abdominal pain. Endocrine: Negative for cold intolerance and heat intolerance. Musculoskeletal: Negative for neck pain and neck stiffness. Skin: Negative for color change, pallor, rash and wound. Neurological: Negative for dizziness, facial asymmetry, light-headedness, numbness and headaches. Hematological: Does not bruise/bleed easily. Physical Exam  Vitals signs and nursing note reviewed. Exam conducted with a chaperone present. Constitutional:       General: She is not in acute distress. Appearance: Normal appearance. She is well-developed and well-groomed. HENT:      Head: Normocephalic and atraumatic. Jaw: There is normal jaw occlusion. Right Ear: External ear normal.      Left Ear: External ear normal.      Nose: Nose normal.      Mouth/Throat:      Mouth: Mucous membranes are moist.      Pharynx: Oropharynx is clear. Eyes:      General: Lids are normal. Vision grossly intact. Right eye: No discharge. Left eye: No discharge. Extraocular Movements: Extraocular movements intact. Conjunctiva/sclera: Conjunctivae normal.      Pupils: Pupils are equal, round, and reactive to light. Neck:      Musculoskeletal: Full passive range of motion without pain, normal range of motion and neck supple. Thyroid: No thyromegaly. Trachea: Trachea and phonation normal. No tracheal deviation. Cardiovascular:      Rate and Rhythm: Normal rate. Pulses: Normal pulses. No decreased pulses. Pulmonary:      Effort: Pulmonary effort is normal. No respiratory distress. Chest:       Abdominal:      General: Abdomen is flat. A surgical scar is present. There is no distension. Palpations: Abdomen is soft. Tenderness: There is no abdominal tenderness. Musculoskeletal: Normal range of motion. General: No deformity. Skin:     General: Skin is warm. Capillary Refill: Capillary refill takes less than 2 seconds. Findings: No erythema or rash. Neurological:      General: No focal deficit present. Mental Status: She is alert and oriented to person, place, and time. Mental status is at baseline. Sensory: No sensory deficit. Psychiatric:         Mood and Affect: Mood normal.         Behavior: Behavior normal. Behavior is cooperative. Thought Content: Thought content normal.         Judgment: Judgment normal.           On this date 01/20/21 I have spent 15 minutes reviewing previous notes, test results and face to face with the patient discussing the diagnosis and importance of compliance with the treatment plan as well as documenting on the day of the visit. An electronic signature was used to authenticate this note.     --Donavan Chávez MD

## 2021-01-28 ENCOUNTER — HOSPITAL ENCOUNTER (OUTPATIENT)
Dept: OCCUPATIONAL THERAPY | Age: 66
Setting detail: THERAPIES SERIES
Discharge: HOME | End: 2021-01-28
Payer: COMMERCIAL

## 2021-01-28 PROCEDURE — 97165 OT EVAL LOW COMPLEX 30 MIN: CPT

## 2021-01-28 PROCEDURE — 97110 THERAPEUTIC EXERCISES: CPT

## 2021-01-28 NOTE — FLOWSHEET NOTE
Elbow Flexion     Elbow Extension     Forearm Pronation     Forearm Supination     [] Elbow Strength is grossly WFL. Wrist Flexion     Wrist Extension     Wrist Radial Deviation     Wrist Ulnar Deviation     []  Wrist Strength is grossly WFL. Right Left    Strength Setting:      Pinch Strength Tip Pinch:      Lateral Pinch           If no ratings are recorded, no formal assessment was completed. TREATMENT   Precautions:    Pain:      X in shaded column indicates Activity Completed Today   Modalities Parameters/  Location  Notes/Comments                     Manual Therapy Time/  Technique  Notes/Comments   PROM Left shoulder all directions to tolerance  X                Exercises   Sets/  Sec Reps  Notes/Comments   Supine hands together chest press  3 X Pt. Had catching pain and so stopped after 3 reps   Supine dowel for ER 10 sec hold 5 X    Scap pinches 3 sec hold 5 X    Scapular depression 3 sec hold 5 X    Table slides 10 sec hold 5 X                  Activities Time    Notes/Comments                       Specific Interventions Next Treatment: PROM/AAROM/AROM as tolerated, STM as needed, gentle strengthening when able, modalities as needed    Activity/Treatment Tolerance:  [x]  Patient tolerated treatment well  []  Patient limited by fatigue  []  Patient limited by pain   []  Patient limited by other medical complications  []  Other:     Assessment: Pt. Presents for eval for left shoulder 2 weeks after a shoulder scope. Pt. States she was told she could wean out of her sling. Pt. With limited motion and strength as well as pain. Will benefit from skilled OT for ROM and strengthening for return to PLOF  Areas for Improvement: impaired ROM, impaired strength and pain  Prognosis: good    GOALS:  Patient Goal: To be able to complete self care without pain or difficulty and complete housework and work duties    Short Term Goals:  Time Frame: 4 weeks  1. Pt.  Will demo independence with HEP for left shoulder for improved ROM and eventual strength for return to daily routine  2. Pt. Will demo improved PROM left shoulder flexion= 150, abd=90, ER= 65 for ease with bathing and dressing  3. Pt. Will report decreased pain in left shoulder to no greater than 4/10 with movement for ease with work tasks    Long Term Goals:  Time Frame: 6 weeks  1. Pt. Will demo functional AROM left shoulder for independent return to all self care, housework, and work   2. Pt. Will be able to lift a 5# bag of groceries with LUE to carry into house without difficulty  3. Pt. Will be able to push up from a chair with BUE without pain in left shoulder      Patient Education:   [x]  HEP/Education Completed: Plan of Care, Goals, encouraged pt. To ice her shoulder in the evening after work if painful   Medbridge Access HEP: scap pinches, scap depression, supine dowel for ER, table slides  []  No new Education completed  []  Reviewed Prior HEP      [x]  Patient verbalized and/or demonstrated understanding of education provided. []  Patient unable to verbalize and/or demonstrate understanding of education provided. Will continue education. [x]  Barriers to learning: none    PLAN:  Treatment Recommendations: Strengthening, Range of Motion, Manual Therapy - Soft Tissue Mobilization and Home Exercise Program    [x]  Plan of care initiated. Plan to see patient 2 times per week for 6 weeks to address the treatment planned outlined above. []  Continue with current plan of care  []  Modify plan of care as follows:    []  Hold pending physician visit  []  Discharge    Time In 800   Time Out 850       Timed Code Minutes: Minutes Units   ADL (08590)     Aquatics (94069)     Manual Therapy (09432)     Neuro (87805)     Th. Activities (43917)     Th. Exercise (61021) 15 1   Wheelchair Mgmt (49892)     Cognitive Function (1st 15 min - 14044)     Cognitive Function (per sub.  15 min - 67721)     Ultrasound (39202)     Ionto (51385)     Manual E-Stim (25952)          TOTAL TREATMENT TIME: 50 minutes       Electronically Signed by: RANJAN Joseph/CECIL [id number]

## 2021-02-02 ENCOUNTER — HOSPITAL ENCOUNTER (OUTPATIENT)
Dept: OCCUPATIONAL THERAPY | Age: 66
Setting detail: THERAPIES SERIES
Discharge: HOME | End: 2021-02-02
Payer: COMMERCIAL

## 2021-02-02 PROCEDURE — 97110 THERAPEUTIC EXERCISES: CPT

## 2021-02-02 NOTE — PROGRESS NOTES
shoulder all directions to tolerance  X Improved tolerance with distraction given. Sidelying manual massage to L upper trap and levator scap  X    Gentle scap and GH mobs  X    Exercises   Sets/  Sec Reps  Notes/Comments   Supine hands together chest press 1 10 X Pt. Had catching pain and so stopped after 3 reps   Supine hands clasped flexion 90 degrees to overhead 1 10 X    Supine dowel for ER 10 sec hold 5 X    Scap pinches 3 sec hold 10 X    Scapular depression 3 sec hold 5 X    Table slides 10 sec hold 5 X    Standing dowel extension behind the back 1 10 X    Wall slide 1 5 X    Activities Time    Notes/Comments                       Specific Interventions Next Treatment: PROM/AAROM/AROM as tolerated, STM as needed, gentle strengthening when able, modalities as needed    Activity/Treatment Tolerance:  [x]  Patient tolerated treatment well  []  Patient limited by fatigue  []  Patient limited by pain   []  Patient limited by other medical complications  []  Other:     Assessment: Patient is progressing towards goals. Areas for Improvement: impaired ROM, impaired strength and pain  Prognosis: good    GOALS:  Patient Goal: To be able to complete self care without pain or difficulty and complete housework and work duties    Short Term Goals:  Time Frame: 4 weeks  1. Pt. Will demo independence with HEP for left shoulder for improved ROM and eventual strength for return to daily routine  2. Pt. Will demo improved PROM left shoulder flexion= 150, abd=90, ER= 65 for ease with bathing and dressing  3. Pt. Will report decreased pain in left shoulder to no greater than 4/10 with movement for ease with work tasks    Long Term Goals:  Time Frame: 6 weeks  1. Pt. Will demo functional AROM left shoulder for independent return to all self care, housework, and work   2. Pt. Will be able to lift a 5# bag of groceries with LUE to carry into house without difficulty  3. Pt.  Will be able to push up from a chair with BUE without pain in left shoulder      Patient Education:   []  HEP/Education Completed: Plan of Care, Goals, encouraged pt. To ice her shoulder in the evening after work if painful   Medbridge Access HEP: scap pinches, scap depression, supine dowel for ER, table slides  []  No new Education completed  [x]  Reviewed Prior HEP and plan of care      [x]  Patient verbalized and/or demonstrated understanding of education provided. []  Patient unable to verbalize and/or demonstrate understanding of education provided. Will continue education. [x]  Barriers to learning: none    PLAN:  Treatment Recommendations: Strengthening, Range of Motion, Manual Therapy - Soft Tissue Mobilization and Home Exercise Program    []  Plan of care initiated. Plan to see patient 2 times per week for 6 weeks to address the treatment planned outlined above. [x]  Continue with current plan of care  []  Modify plan of care as follows:    []  Hold pending physician visit  []  Discharge    Time In 1625   Time Out 1655       Timed Code Minutes: Minutes Units   ADL (80370)     Aquatics (26190)     Manual Therapy (44276)     Neuro (47064)     Th. Activities (40553)     Th. Exercise (74301) 30 2   Wheelchair Mgmt (56581)     Cognitive Function (1st 15 min - 58676)     Cognitive Function (per sub. 15 min - 74526)     Ultrasound (56207)     Ionto (23991)     Manual E-Stim (94471)          TOTAL TREATMENT TIME: 30 minutes       Electronically Signed by:  RANJAN Boateng/L [id number]

## 2021-02-05 ENCOUNTER — HOSPITAL ENCOUNTER (OUTPATIENT)
Dept: OCCUPATIONAL THERAPY | Age: 66
Setting detail: THERAPIES SERIES
Discharge: HOME | End: 2021-02-05
Payer: COMMERCIAL

## 2021-02-05 PROCEDURE — 97110 THERAPEUTIC EXERCISES: CPT

## 2021-02-05 NOTE — PROGRESS NOTES
[address]AP[address]  [] EVALUATION  [x] DAILY NOTE (LAND) [] DAILY NOTE (AQUATIC ) [] PROGRESS NOTE [] DISCHARGE NOTE    [x] OUTPATIENT REHABILITATION [facility]   [] [place] [address]    [] [facility]   [] [facility]    Date: 2021  Patient Name:  Lester Jacob  : [birth year]  MRN: [id number]  CSN: [id number]    Referring Practitioner Mary Mccray MD   Diagnosis Impingement syndrome of left shoulder [M75.42]  Strain of muscle(s) and tendon(s) of the rotator cuff of left shoulder, initial encounter [S46.012A]    Treatment Diagnosis Left shoulder scope 21   Date of Evaluation 21      Functional Outcome Measure Used UEFS   Functional Outcome Score 50 (21)       Insurance: Primary: Payor: Ashley Iglesias /  /  / ,   Secondary:    Authorization Information: No visit limit, modalities covered   Visit # 3, 3/10 for progress note   Visits Allowed:    Recertification Date: 2021   Physician Follow-Up: Last week of Feb.    Physician Orders: Cee Tong, AYDIN left shoulder/ Heat/US/Massage; Increase strength and ROM   Pertinent History: Pt. Had been having pain for 9 months in left shoulder after having to use crutches due to a hip issue. Pt. Had surgery 21 for a shoulder scope. Pt. States she did not have RC tear but some fraying that the doctor cleaned up. Pt. Not wearing a sling today and was told she could wean out of it. SUBJECTIVE: Patient reports that she is having increased pain on the top of the shoulder today, is not sure why. Noting increased pain with pulling up her pants.           TREATMENT   Precautions:    Pain: 3-4/10 at rest     X in shaded column indicates Activity Completed Today   Modalities Parameters/  Location  Notes/Comments                     Manual Therapy Time/  Technique  Notes/Comments   PROM Left shoulder all directions to tolerance  X    Sidelying manual massage to L upper trap and encouraged pt. To ice her shoulder in the evening after work if painful   Medbridge Access HEP: scap pinches, scap depression, supine dowel for ER, table slides  []  No new Education completed  [x]  Reviewed Prior HEP and plan of care      [x]  Patient verbalized and/or demonstrated understanding of education provided. []  Patient unable to verbalize and/or demonstrate understanding of education provided. Will continue education. [x]  Barriers to learning: none    PLAN:  Treatment Recommendations: Strengthening, Range of Motion, Manual Therapy - Soft Tissue Mobilization and Home Exercise Program    []  Plan of care initiated. Plan to see patient 2 times per week for 6 weeks to address the treatment planned outlined above. [x]  Continue with current plan of care  []  Modify plan of care as follows:    []  Hold pending physician visit  []  Discharge    Time In 1500   Time Out 1545       Timed Code Minutes: Minutes Units   ADL (47690)     Aquatics (67628)     Manual Therapy (90545)     Neuro (68355)     Th. Activities (79922)     Th. Exercise (83895) 45 3   Wheelchair Mgmt (69565)     Cognitive Function (1st 15 min - 11270)     Cognitive Function (per sub.  15 min - 94585)     Ultrasound (23891)     Ionto (19158)     Manual E-Stim (57688)          TOTAL TREATMENT TIME: 45 minutes       Electronically Signed by: Alin Barry, CELINA, OTR/L [id number]

## 2021-02-08 ENCOUNTER — HOSPITAL ENCOUNTER (OUTPATIENT)
Dept: OCCUPATIONAL THERAPY | Age: 66
Setting detail: THERAPIES SERIES
Discharge: HOME | End: 2021-02-08
Payer: COMMERCIAL

## 2021-02-08 PROCEDURE — 97110 THERAPEUTIC EXERCISES: CPT

## 2021-02-08 NOTE — PROGRESS NOTES
[address]  [] EVALUATION  [x] DAILY NOTE (LAND) [] DAILY NOTE (AQUATIC ) [] PROGRESS NOTE [] DISCHARGE NOTE    [x] OUTPATIENT REHABILITATION [facility]   [] [place] [address]    [] [facility]   [] Riki Zapata    Date: 2021  Patient Name:  Tanja Hernandez  : [birth year]  MRN: [id number]  CSN: [id number]    Referring Practitioner Alessandra Hitchcock MD   Diagnosis Impingement syndrome of left shoulder [M75.42]  Strain of muscle(s) and tendon(s) of the rotator cuff of left shoulder, initial encounter [S46.012A]    Treatment Diagnosis Left shoulder scope 21   Date of Evaluation 21      Functional Outcome Measure Used UEFS   Functional Outcome Score 50 (21)       Insurance: Primary: Payor: [facility] /  /  / ,   Secondary:    Authorization Information: No visit limit, modalities covered   Visit # 4, 10 for progress note   Visits Allowed:    Recertification Date: 2021   Physician Follow-Up: Last week of Feb.    Physician Orders: [facility], PROM left shoulder/ Heat/US/Massage; Increase strength and ROM   Pertinent History: Pt. Had been having pain for 9 months in left shoulder after having to use crutches due to a hip issue. Pt. Had surgery 21 for a shoulder scope. Pt. States she did not have RC tear but some fraying that the doctor cleaned up. Pt. Not wearing a sling today and was told she could wean out of it. SUBJECTIVE: Patient reports she tried to rest it over the weekend due to increased pain last week. Does feel the pain has decreased, has been trying to ice at work.          TREATMENT   Precautions:    Pain: 0/10 at rest     X in shaded column indicates Activity Completed Today   Modalities Parameters/  Location  Notes/Comments                     Manual Therapy Time/  Technique  Notes/Comments   PROM Left shoulder all directions to tolerance  X    Sidelying manual massage to L upper trap, levator scap, medial scap border  X    Gentle scap and GH mobs  X    Exercises   Sets/  Sec Reps  Notes/Comments   Sleeper stretch 10 sec 5 X Improved flexion afterwards   Pec stretch over vertical towel roll   X    Supine hands together chest press 1 5 X Stopped due to catching   Supine dowel LUE in supination for flexion 90 degrees to overhead 1 10 X    Supine dowel for ER 5 sec hold 10 X    Scap pinches 3 sec hold 10 X    Scapular depression 3 sec hold 5 X    Table slides 10 sec hold 5 X    Standing dowel extension behind the back 1 10     Wall slide 1 5  Tried on 2-5, pain reported so stopped   Activities Time    Notes/Comments   Trialed kinesiotape \"I\" strip anterior to posterior for mechanical correction  X                  Specific Interventions Next Treatment: PROM/AAROM/AROM as tolerated, STM as needed, gentle strengthening when able, modalities as needed    Activity/Treatment Tolerance:  [x]  Patient tolerated treatment well  []  Patient limited by fatigue  []  Patient limited by pain   []  Patient limited by other medical complications  []  Other:     Assessment: Patient is progressing slowly towards goals. Patient has a painful \"catch\" in the anterior shoulder past several sessions. Encouraged icing and trialed kinesiotape. Areas for Improvement: impaired ROM, impaired strength and pain  Prognosis: good    GOALS:  Patient Goal: To be able to complete self care without pain or difficulty and complete housework and work duties    Short Term Goals:  Time Frame: 4 weeks  1. Pt. Will demo independence with HEP for left shoulder for improved ROM and eventual strength for return to daily routine  2. Pt. Will demo improved PROM left shoulder flexion= 150, abd=90, ER= 65 for ease with bathing and dressing  3. Pt. Will report decreased pain in left shoulder to no greater than 4/10 with movement for ease with work tasks    Long Term Goals:  Time Frame: 6 weeks  1. Pt.  Will demo functional AROM left shoulder for independent return to all self care, housework, and work   2. Pt. Will be able to lift a 5# bag of groceries with LUE to carry into house without difficulty  3. Pt. Will be able to push up from a chair with BUE without pain in left shoulder      Patient Education:   []  HEP/Education Completed: Plan of Care, Goals, encouraged pt. To ice her shoulder in the evening after work if painful   Medbridge Access HEP: scap pinches, scap depression, supine dowel for ER, table slides  []  No new Education completed  [x]  Reviewed Prior HEP, plan of care, purpose/precautions/wear schedule of kinesiotape. [x]  Patient verbalized and/or demonstrated understanding of education provided. []  Patient unable to verbalize and/or demonstrate understanding of education provided. Will continue education. [x]  Barriers to learning: none    PLAN:  Treatment Recommendations: Strengthening, Range of Motion, Manual Therapy - Soft Tissue Mobilization and Home Exercise Program    []  Plan of care initiated. Plan to see patient 2 times per week for 6 weeks to address the treatment planned outlined above. [x]  Continue with current plan of care  []  Modify plan of care as follows:    []  Hold pending physician visit  []  Discharge    Time In 1550   Time Out 1630       Timed Code Minutes: Minutes Units   ADL (02198)     Aquatics (15286)     Manual Therapy (80729)     Neuro (91301)     Th. Activities (41758)     Th. Exercise (15542) 40 3   Wheelchair Mgmt (45286)     Cognitive Function (1st 15 min - 25524)     Cognitive Function (per sub.  15 min - 05576)     Ultrasound (53591)     Ionto (88187)     Manual E-Stim (97084)          TOTAL TREATMENT TIME: 40 minutes       KARINA VILCHIS/CECIL #[id number]

## 2021-02-09 ENCOUNTER — HOSPITAL ENCOUNTER (OUTPATIENT)
Dept: OCCUPATIONAL THERAPY | Age: 66
Setting detail: THERAPIES SERIES
Discharge: HOME | End: 2021-02-09
Payer: COMMERCIAL

## 2021-02-09 PROCEDURE — 97110 THERAPEUTIC EXERCISES: CPT

## 2021-02-09 PROCEDURE — 97140 MANUAL THERAPY 1/> REGIONS: CPT

## 2021-02-09 NOTE — PROGRESS NOTES
[address]  [] EVALUATION  [x] DAILY NOTE (LAND) [] DAILY NOTE (AQUATIC ) [] PROGRESS NOTE [] DISCHARGE NOTE    [x] OUTPATIENT REHABILITATION [facility]   [] [place] [address]    [] [facility]   [] Lyndon Newby    Date: 2021  Patient Name:  Camille Taylor  : [birth year]  MRN: [id number]  CSN: [id number]    Referring Practitioner Radha Lawson MD   Diagnosis Impingement syndrome of left shoulder [M75.42]  Strain of muscle(s) and tendon(s) of the rotator cuff of left shoulder, initial encounter [S46.012A]    Treatment Diagnosis Left shoulder scope 21   Date of Evaluation 21      Functional Outcome Measure Used UEFS   Functional Outcome Score 50 (21)       Insurance: Primary: Payor: Colleen Shetty /  /  / ,   Secondary:    Authorization Information: No visit limit, modalities covered   Visit # 5, 5/10 for progress note   Visits Allowed:    Recertification Date: 2021   Physician Follow-Up: Last week of Feb.    Physician Orders: Jacques Hardy, PROM left shoulder/ Heat/US/Massage; Increase strength and ROM   Pertinent History: Pt. Had been having pain for 9 months in left shoulder after having to use crutches due to a hip issue. Pt. Had surgery 21 for a shoulder scope. Pt. States she did not have RC tear but some fraying that the doctor cleaned up. Pt. Not wearing a sling today and was told she could wean out of it.       SUBJECTIVE: Patient reports shoulder feels ok at rest but is 5/10 pain with active movement         TREATMENT   Precautions:    Pain: 0/10 at rest     X in shaded column indicates Activity Completed Today   Modalities Parameters/  Location  Notes/Comments                     Manual Therapy Time/  Technique  Notes/Comments   PROM Left shoulder all directions to tolerance  Xx    Sidelying manual massage to L upper trap, levator scap, medial scap border; STM to deltoid  Xx    Gentle scap and GH mobs Xx    Exercises   Sets/  Sec Reps  Notes/Comments   Sleeper stretch 10 sec 5 Xx tight   Pec stretch over vertical towel roll       Supine dowel with LUE in supination for chest press 1 10 Xx Less catching when coming back down with LUE in supination   Supine dowel LUE in supination for flexion 90 degrees to overhead 1 10 Xx    Supine dowel for ER 5 sec hold 10 Xx    Scap pinches 3 sec hold 10     Scapular depression in sidelying 3 sec hold 10 Xx    Table slides 10 sec hold 5     Standing dowel extension behind the back 1 10 Xx Lift off to tolerance   Pulleys for shoulder flexion and scaption  10 ea Xx    Wall walk   5 Xx Able to get ~1/2 way up board                 Wall slide 1 5  Tried on 2-5, pain reported so stopped   Activities Time    Notes/Comments   Trialed kinesiotape \"I\" strip anterior to posterior for mechanical correction  X I strip still in place. Added a Y strip to deltoid for inhibition. Specific Interventions Next Treatment: PROM/AAROM/AROM as tolerated, STM as needed, gentle strengthening when able, modalities as needed    Activity/Treatment Tolerance:  [x]  Patient tolerated treatment well  []  Patient limited by fatigue  []  Patient limited by pain   []  Patient limited by other medical complications  []  Other:     Assessment: Patient is progressing slowly towards goals. Continues to have \"catch\" in shoulder with shoulder flexion. Added Y strip to deltoid to try to decrease pain. Areas for Improvement: impaired ROM, impaired strength and pain  Prognosis: good    GOALS:  Patient Goal: To be able to complete self care without pain or difficulty and complete housework and work duties    Short Term Goals:  Time Frame: 4 weeks  1. Pt. Will demo independence with HEP for left shoulder for improved ROM and eventual strength for return to daily routine  2. Pt. Will demo improved PROM left shoulder flexion= 150, abd=90, ER= 65 for ease with bathing and dressing  3. Pt.  Will report decreased pain in left shoulder to no greater than 4/10 with movement for ease with work tasks    Long Term Goals:  Time Frame: 6 weeks  1. Pt. Will demo functional AROM left shoulder for independent return to all self care, housework, and work   2. Pt. Will be able to lift a 5# bag of groceries with LUE to carry into house without difficulty  3. Pt. Will be able to push up from a chair with BUE without pain in left shoulder      Patient Education:   []  HEP/Education Completed: Plan of Care, Goals, encouraged pt. To ice her shoulder in the evening after work if painful   Medbridge Access HEP: scap pinches, scap depression, supine dowel for ER, table slides  [x]  No new Education completed  []  Reviewed Prior HEP, plan of care, purpose/precautions/wear schedule of kinesiotape. []  Patient verbalized and/or demonstrated understanding of education provided. []  Patient unable to verbalize and/or demonstrate understanding of education provided. Will continue education. [x]  Barriers to learning: none    PLAN:  Treatment Recommendations: Strengthening, Range of Motion, Manual Therapy - Soft Tissue Mobilization and Home Exercise Program    []  Plan of care initiated. Plan to see patient 2 times per week for 6 weeks to address the treatment planned outlined above. [x]  Continue with current plan of care  []  Modify plan of care as follows:    []  Hold pending physician visit  []  Discharge    Time In 740   Time Out 815       Timed Code Minutes: Minutes Units   ADL (97948)     Aquatics (70196)     Manual Therapy ([id number]) 10 1   Neuro (69065)     Th. Activities (11356)     Th. Exercise (04404) 25 1   Wheelchair Mgmt (81926)     Cognitive Function (1st 15 min - 50073)     Cognitive Function (per sub.  15 min - 50529)     Ultrasound (84711)     Ionto (80336)     Manual E-Stim (08745)          TOTAL TREATMENT TIME: 35 minutes       Laura 70, OTR/L 8520

## 2021-02-12 ENCOUNTER — HOSPITAL ENCOUNTER (OUTPATIENT)
Dept: OCCUPATIONAL THERAPY | Age: 66
Setting detail: THERAPIES SERIES
Discharge: HOME | End: 2021-02-12
Payer: COMMERCIAL

## 2021-02-12 PROCEDURE — 97140 MANUAL THERAPY 1/> REGIONS: CPT

## 2021-02-12 PROCEDURE — 97110 THERAPEUTIC EXERCISES: CPT

## 2021-02-12 NOTE — PROGRESS NOTES
[address]  [] EVALUATION  [x] DAILY NOTE (LAND) [] DAILY NOTE (AQUATIC ) [] PROGRESS NOTE [] DISCHARGE NOTE    [x] OUTPATIENT REHABILITATION [facility]   [] [facility]    [] [facility]   [] Marcy Gomez    Date: 2021  Patient Name:  Berna Eubanks  : [birth year]  MRN: [id number]  CSN: [id number]    Referring Practitioner Kike Pappas MD   Diagnosis Impingement syndrome of left shoulder [M75.42]  Strain of muscle(s) and tendon(s) of the rotator cuff of left shoulder, initial encounter [S46.012A]    Treatment Diagnosis Left shoulder scope 21   Date of Evaluation 21      Functional Outcome Measure Used UEFS   Functional Outcome Score 50 (21)       Insurance: Primary: Payor: Helen Townsend /  /  / ,   Secondary:    Authorization Information: No visit limit, modalities covered   Visit # 6, 6/10 for progress note   Visits Allowed:    Recertification Date: 2021   Physician Follow-Up: Last week of Feb.    Physician Orders: David Avendaño, PROM left shoulder/ Heat/US/Massage; Increase strength and ROM   Pertinent History: Pt. Had been having pain for 9 months in left shoulder after having to use crutches due to a hip issue. Pt. Had surgery 21 for a shoulder scope. Pt. States she did not have RC tear but some fraying that the doctor cleaned up. Pt. Not wearing a sling today and was told she could wean out of it. SUBJECTIVE: Patient states shoulder always feels stiff in the morning. Reports continued 5/10 pain with movement. Pt. States she leaves  to go to [place] for a week.  Pt. Scheduled for therapy when she returns        TREATMENT   Precautions:    Pain: 0/10 at rest but 5/10 with movement     X in shaded column indicates Activity Completed Today   Modalities Parameters/  Location  Notes/Comments                     Manual Therapy Time/  Technique  Notes/Comments   PROM Left shoulder all directions to tolerance with gentle stretch at end ranges  X Tightness and pain with abduction - improved after sleeper stretch   Sidelying manual massage to L upper trap, levator scap, medial scap border; STM to deltoid  X    Gentle scap and GH mobs  X    Exercises   Sets/  Sec Reps  Notes/Comments   Sleeper stretch 10 sec 5 X Tightness continues   Manual Pec stretch by therapist  10 sec 5 X    Supine dowel for chest press 1 10 X    Supine dowel for flexion 90 degrees to overhead 1 10 X No catching today with shoulder flexion   Supine dowel for ER 5 sec hold 10 X    Scap pinches 3 sec hold 10     Scapular depression in sidelying 3 sec hold 10     Scapular punches  10 X Good tolerance   Supine ceiling circles  10 ea way X Good tolerance   Standing dowel extension behind the back 1 10 X Lift off to tolerance   Pulleys for shoulder flexion and scaption  10 ea X    Wall walk   5 X Able to get all the way to the top of board today                 Wall slide 1 5 X Improved tolerance today   Activities Time    Notes/Comments   Trialed kinesiotape \"I\" strip anterior to posterior for mechanical correction   I strip still in place. Added a Y strip to deltoid for inhibition. Specific Interventions Next Treatment: PROM/AAROM/AROM as tolerated, STM as needed, gentle strengthening when able, modalities as needed    Activity/Treatment Tolerance:  [x]  Patient tolerated treatment well  []  Patient limited by fatigue  []  Patient limited by pain   []  Patient limited by other medical complications  []  Other:     Assessment: Patient is progressing toward goals - improved tolerance for PROM and exercises today, less reports of catching, added supine ceiling circles and scap punches with good tolerance today.     Areas for Improvement: impaired ROM, impaired strength and pain  Prognosis: good    GOALS:  Patient Goal: To be able to complete self care without pain or difficulty and complete housework and work duties    Short Term Goals:  Time Frame: 4 weeks  1. Pt. Will demo independence with HEP for left shoulder for improved ROM and eventual strength for return to daily routine  2. Pt. Will demo improved PROM left shoulder flexion= 150, abd=90, ER= 65 for ease with bathing and dressing  3. Pt. Will report decreased pain in left shoulder to no greater than 4/10 with movement for ease with work tasks    Long Term Goals:  Time Frame: 6 weeks  1. Pt. Will demo functional AROM left shoulder for independent return to all self care, housework, and work   2. Pt. Will be able to lift a 5# bag of groceries with LUE to carry into house without difficulty  3. Pt. Will be able to push up from a chair with BUE without pain in left shoulder      Patient Education:   []  HEP/Education Completed: Plan of Care, Goals, encouraged pt. To ice her shoulder in the evening after work if painful   Medbridge Access HEP: scap pinches, scap depression, supine dowel for ER, table slides  []  No new Education completed  [x]  Reviewed HEP and instructed pt. To concentrate on sleeper stretch, wall slide, and supine ceiling circles while on vacation    [x]  Patient verbalized and/or demonstrated understanding of education provided. []  Patient unable to verbalize and/or demonstrate understanding of education provided. Will continue education. [x]  Barriers to learning: none    PLAN:  Treatment Recommendations: Strengthening, Range of Motion, Manual Therapy - Soft Tissue Mobilization and Home Exercise Program    []  Plan of care initiated. Plan to see patient 2 times per week for 6 weeks to address the treatment planned outlined above.   [x]  Continue with current plan of care  []  Modify plan of care as follows:    []  Hold pending physician visit  []  Discharge    Time In 725   Time Out 805       Timed Code Minutes: Minutes Units   ADL ([id number])     Aquatics (06489)     Manual Therapy ([id number]) 15 1   Neuro (50227)     Th. Activities (94711)     Th. Exercise (55106) 25 1   Wheelchair Mgmt (00592)     Cognitive Function (1st 15 min - 09371)     Cognitive Function (per sub.  15 min - 86767)     Ultrasound (61336)     Ionto (05323)     Manual E-Stim (40106)          TOTAL TREATMENT TIME: 40 minutes       Laura 70, OTR/L 4460

## 2021-02-16 NOTE — PROGRESS NOTES
Bilateral breast reduction postoperative check    Patient presents postop from bilateral breast reduction  She notes improvement in the bilateral shoulder pain, neck pain, upper back pain. She has no complaints of fever, chills, nausea, vomiting. She has no complaints of drainage or redness from her incisions. On exam  Bilateral breast reduction incisions are clean, dry, and intact. Nipple/areolar complexes are intact and viable. Continue with postoperative surgical bra support and lifting restrictions. Follow up in 3 weeks.

## 2021-02-24 ENCOUNTER — HOSPITAL ENCOUNTER (OUTPATIENT)
Dept: OCCUPATIONAL THERAPY | Age: 66
Setting detail: THERAPIES SERIES
Discharge: HOME | End: 2021-02-24
Payer: COMMERCIAL

## 2021-02-24 PROCEDURE — 97110 THERAPEUTIC EXERCISES: CPT

## 2021-02-24 NOTE — PROGRESS NOTES
[address]  [] EVALUATION  [x] DAILY NOTE (LAND) [] DAILY NOTE (AQUATIC ) [] PROGRESS NOTE [] DISCHARGE NOTE    [x] OUTPATIENT REHABILITATION [facility]   [] [place] [address]    [] [facility]   [] Gabi Knight    Date: 2021  Patient Name:  Niki Cole  : [birth year]  MRN: [id number]  CSN: [id number]    Referring Practitioner Bree Maldonado MD   Diagnosis Impingement syndrome of left shoulder [M75.42]  Strain of muscle(s) and tendon(s) of the rotator cuff of left shoulder, initial encounter [S46.012A]    Treatment Diagnosis Left shoulder scope 21   Date of Evaluation 21      Functional Outcome Measure Used UEFS   Functional Outcome Score 50 (21)       Insurance: Primary: Payor: oNlberto Bowen /  /  / ,   Secondary:    Authorization Information: No visit limit, modalities covered   Visit # 7, 7/10 for progress note   Visits Allowed:    Recertification Date: 2021   Physician Follow-Up: Last week of Feb.    Physician Orders: Clarissa Gamboa, PROM left shoulder/ Heat/US/Massage; Increase strength and ROM   Pertinent History: Pt. Had been having pain for 9 months in left shoulder after having to use crutches due to a hip issue. Pt. Had surgery 21 for a shoulder scope. Pt. States she did not have RC tear but some fraying that the doctor cleaned up. Pt. Not wearing a sling today and was told she could wean out of it. SUBJECTIVE: Shoulder is still stiff in the morning. Saw the PA yesterday. He feels that additional therapy is necessary at this time.           TREATMENT   Precautions:    Pain: 0/10 at rest, 2-3/10 with movement     X in shaded column indicates Activity Completed Today   Modalities Parameters/  Location  Notes/Comments                     Manual Therapy Time/  Technique  Notes/Comments   PROM Left shoulder all directions to tolerance with gentle stretch at end ranges  X STM with IR stretch Sidelying manual massage to L upper trap, levator scap, medial scap border; STM to deltoid      Gentle scap and [address] mobs      Exercises   Sets/  Sec Reps  Notes/Comments   Sleeper stretch 10 sec 5 X    Manual Pec stretch by therapist  10 sec 5 X Stretch off of the edge of the table   Supine dowel for chest press 1 10     Supine dowel for flexion full arc 1 10 x    Supine dowel for ER 5 sec hold 10     Scap pinches 3 sec hold 10 X    Scapular depression in sidelying 3 sec hold 10     Scapular punches  10 X    Supine ceiling circles  [address] X    Standing dowel extension behind the back 1 10     Pulleys for shoulder flexion and scaption  10 ea X    Wall walk   5     Supine flexion from side to 90 degrees, and then from 90 degrees to overhead 1 5 X    Supine flexion from 90 to overhead 1 10 X    Wall slide 1 5     Activities Time    Notes/Comments   Trialed kinesiotape \"I\" strip anterior to posterior for mechanical correction   I strip still in place. Added a Y strip to deltoid for inhibition. Specific Interventions Next Treatment: PROM/AAROM/AROM as tolerated, STM as needed, gentle strengthening when able, modalities as needed    Activity/Treatment Tolerance:  [x]  Patient tolerated treatment well  []  Patient limited by fatigue  []  Patient limited by pain   []  Patient limited by other medical complications  []  Other:     Assessment: Patient is progressing toward goals. Good PROM at the end of the session. Weakness noted. Tolerated strengthening activity well. Areas for Improvement: impaired ROM, impaired strength and pain  Prognosis: good    GOALS:  Patient Goal: To be able to complete self care without pain or difficulty and complete housework and work duties    Short Term Goals:  Time Frame: 4 weeks  1. Pt. Will demo independence with HEP for left shoulder for improved ROM and eventual strength for return to daily routine  2.  Pt. Will demo improved PROM left shoulder flexion= 150, abd=90, ER= 65 for ease with bathing and dressing  3. Pt. Will report decreased pain in left shoulder to no greater than 4/10 with movement for ease with work tasks    Long Term Goals:  Time Frame: 6 weeks  1. Pt. Will demo functional AROM left shoulder for independent return to all self care, housework, and work   2. Pt. Will be able to lift a 5# bag of groceries with LUE to carry into house without difficulty  3. Pt. Will be able to push up from a chair with BUE without pain in left shoulder      Patient Education:   []  HEP/Education Completed: Plan of Care, Goals, encouraged pt. To ice her shoulder in the evening after work if painful   Medbridge Access HEP: scap pinches, scap depression, supine dowel for ER, table slides  []  No new Education completed  [x]  Reviewed HEP and instructed pt. To concentrate on sleeper stretch, wall slide, and supine ceiling circles while on vacation    [x]  Patient verbalized and/or demonstrated understanding of education provided. []  Patient unable to verbalize and/or demonstrate understanding of education provided. Will continue education. [x]  Barriers to learning: none    PLAN:  Treatment Recommendations: Strengthening, Range of Motion, Manual Therapy - Soft Tissue Mobilization and Home Exercise Program    []  Plan of care initiated. Plan to see patient 2 times per week for 6 weeks to address the treatment planned outlined above. [x]  Continue with current plan of care  []  Modify plan of care as follows:    []  Hold pending physician visit  []  Discharge    Time In 1600   Time Out 1645       Timed Code Minutes: Minutes Units   ADL (29562)     Aquatics (80913)     Manual Therapy (40122)     Neuro (55102)     Th. Activities (19170)     Th. Exercise (27765) 45 3   Wheelchair Mgmt (56767)     Cognitive Function (1st 15 min - 29869)     Cognitive Function (per sub.  15 min - 34619)     Ultrasound (70270)     Ionto (42305)     Manual E-Stim (24240)          TOTAL TREATMENT TIME: 45 minutes       Denise Dietz [place], OTR/L 1653

## 2021-02-26 ENCOUNTER — HOSPITAL ENCOUNTER (OUTPATIENT)
Dept: OCCUPATIONAL THERAPY | Age: 66
Setting detail: THERAPIES SERIES
Discharge: HOME | End: 2021-02-26
Payer: COMMERCIAL

## 2021-02-26 PROCEDURE — 97110 THERAPEUTIC EXERCISES: CPT

## 2021-02-26 NOTE — PROGRESS NOTES
[address]  [] EVALUATION  [x] DAILY NOTE (LAND) [] DAILY NOTE (AQUATIC ) [] PROGRESS NOTE [] DISCHARGE NOTE    [x] OUTPATIENT REHABILITATION [facility]   [] [place] [address]    [] [facility]   [] Sulema Zambrano    Date: 2021  Patient Name:  David Plata  : [birth year]  MRN: [id number]  CSN: [id number]    Referring Practitioner Merline Sly, MD   Diagnosis Impingement syndrome of left shoulder [M75.42]  Strain of muscle(s) and tendon(s) of the rotator cuff of left shoulder, initial encounter [S46.012A]    Treatment Diagnosis Left shoulder scope 21   Date of Evaluation 21      Functional Outcome Measure Used UEFS   Functional Outcome Score 50 (21)       Insurance: Primary: Payor: Bubba Dimas /  /  / ,   Secondary:    Authorization Information: No visit limit, modalities covered   Visit # 8, 8/10 for progress note   Visits Allowed:    Recertification Date: 2021   Physician Follow-Up: Last week of Feb.    Physician Orders: [facility], PROM left shoulder/ Heat/US/Massage; Increase strength and ROM   Pertinent History: Pt. Had been having pain for 9 months in left shoulder after having to use crutches due to a hip issue. Pt. Had surgery 21 for a shoulder scope. Pt. States she did not have RC tear but some fraying that the doctor cleaned up. Pt. Not wearing a sling today and was told she could wean out of it. SUBJECTIVE: Reports that she is doing pretty well, no pain with typical activity, if she has pain it is tolerable.           TREATMENT   Precautions:    Pain: 0/10 at rest, 2-3/10 with movement     X in shaded column indicates Activity Completed Today   Modalities Parameters/  Location  Notes/Comments                     Manual Therapy Time/  Technique  Notes/Comments   PROM Left shoulder all directions to tolerance with gentle stretch at end ranges  X STM with IR stretch   Sidelying manual massage to L upper trap, levator scap, medial scap border; STM to deltoid      Gentle scap and [address] mobs      Exercises   Sets/  Sec Reps  Notes/Comments   Sleeper stretch 10 sec 5 X    Manual Pec stretch by therapist  10 sec 5  Stretch off of the edge of the table   Supine dowel for chest press 1 10     Supine dowel for flexion full arc 1 10 x    Supine bench press 1 10 x    Scap pinches 3 sec hold 10     Scapular depression in sidelying 3 sec hold 10     Scapular punches  10 X    Supine ceiling circles  [address]     Standing dowel extension behind the back 1 10     Pulleys for shoulder flexion and scaption  10 ea X    Wall walk   5     Supine flexion from side to 90 degrees, and then from 90 degrees to overhead 1 10 X    Supine flexion from 90 to overhead 1 10     Wall slide 1 5     Activities Time    Notes/Comments   Trialed kinesiotape \"I\" strip anterior to posterior for mechanical correction   I strip still in place. Added a Y strip to deltoid for inhibition. Specific Interventions Next Treatment: PROM/AAROM/AROM as tolerated, STM as needed, gentle strengthening when able, modalities as needed    Activity/Treatment Tolerance:  [x]  Patient tolerated treatment well  []  Patient limited by fatigue  []  Patient limited by pain   []  Patient limited by other medical complications  []  Other:     Assessment: Patient is progressing toward goals. Good ROM continues. Increased ability for 90 to overhead AROM. Areas for Improvement: impaired ROM, impaired strength and pain  Prognosis: good    GOALS:  Patient Goal: To be able to complete self care without pain or difficulty and complete housework and work duties    Short Term Goals:  Time Frame: 4 weeks  1. Pt. Will demo independence with HEP for left shoulder for improved ROM and eventual strength for return to daily routine  2. Pt. Will demo improved PROM left shoulder flexion= 150, abd=90, ER= 65 for ease with bathing and dressing  3. Pt.  Will report 015

## 2021-03-02 ENCOUNTER — HOSPITAL ENCOUNTER (OUTPATIENT)
Dept: OCCUPATIONAL THERAPY | Age: 66
Setting detail: THERAPIES SERIES
Discharge: HOME | End: 2021-03-02
Payer: COMMERCIAL

## 2021-03-02 PROCEDURE — 97110 THERAPEUTIC EXERCISES: CPT

## 2021-03-02 NOTE — PROGRESS NOTES
** PLEASE SIGN, DATE AND TIME CERTIFICATION BELOW AND RETURN TO [facility] OUTPATIENT REHABILITATION (FAX #: [phone]). ATTEST/CO-SIGN IF ACCESSING VIA INNextInput. THANK YOU.**    I certify that I have examined the patient below and determined that Physical Medicine and Rehabilitation service is necessary and that I approve the established plan of care for up to 90 days or as specifically noted. Attestation, signature or co-signature of physician indicates approval of certification requirements.    ________________________ ____________ __________  Physician Signature   Date   Time       [address]  [] EVALUATION  [] DAILY NOTE (LAND) [] DAILY NOTE (AQUATIC )   [x] PROGRESS NOTE [] DISCHARGE NOTE    [x] [facility]   [] [facility]     [] [facility]   [] [facility] Monday    Date: 3/2/2021  Patient Name:  Georgette Smith  : [birth year]  MRN: [id number]  CSN: [id number]    Referring Practitioner Andreea Chappell MD   Diagnosis Impingement syndrome of left shoulder [M75.42]  Strain of muscle(s) and tendon(s) of the rotator cuff of left shoulder, initial encounter [S46.012A]    Treatment Diagnosis Left shoulder scope 21   Date of Evaluation 21      Functional Outcome Measure Used UEFS   Functional Outcome Score 50 (21); 63/80 (3/2/21)      Insurance: Primary: Payor: Timothy Kwan /  /  / ,   Secondary:    Authorization Information: No visit limit, modalities covered   Visit # 9, PN completed 3/2/21   Visits Allowed:    Recertification Date: 2021   Physician Follow-Up: 2021   Physician Orders: AYDIN Lira left shoulder/ Heat/US/Massage; Increase strength and ROM   Pertinent History: Pt. Had been having pain for 9 months in left shoulder after having to use crutches due to a hip issue. Pt. Had surgery 21 for a shoulder scope.  Pt. States she did not have RC tear but some fraying that the doctor cleaned up. Pt. Not wearing a sling today and was told she could wean out of it. SUBJECTIVE:   Patient reports pain in the shoulder has decreased, with the exception of muscle soreness after therapy sessions. Relates she is more independent for light ADL's such as getting dressed and bathing. Difficulty with holding the arm over shoulder level for hair care and lifting light items away from the body due to weakness and fatigue. TREATMENT   Precautions:    Pain: No pain at rest today. X in shaded column indicates Activity Completed Today   Modalities Parameters/  Location  Notes/Comments                     Manual Therapy Time/  Technique  Notes/Comments   Supine PROM to L shoulder all motions to tolerance  x Tight end ranges - improved ER following posterior capsule stretch. Sidelying manual massage to L upper trap, levator scap, medial scap border; STM to deltoid      Gentle scap and [address] mobs      Exercises   Sets/  Sec Reps  Notes/Comments   Sidelying sleeper stretch 20 sec 3 x    Manual pec stretch by therapist  10 sec 5  Stretch off of the edge of the table   Supine dowel for chest press 1 10     Supine dowel for flexion full arc 1 10     Scap pinches 3 sec hold 10     Scapular depression in sidelying 3 sec hold 10     Supine active ceiling press 1 10 x    Supine scapular punch 1 10 x    Supine ceiling circles 2 10 x    Pulleys for shoulder flexion and scaption  10 ea x Warm up   Standing wall slide left UE only 1 10 x    Supine flexion from approx 50 degrees to overhead 1 10 x Modified due to pain with full arc. Activities Time    Notes/Comments   Trialed kinesiotape \"I\" strip anterior to posterior for mechanical correction   I strip still in place. Added a Y strip to deltoid for inhibition.                  Specific Interventions Next Treatment: PROM/AAROM/AROM as tolerated, STM as needed, gentle strengthening when able, modalities as needed    Activity/Treatment Tolerance:  [x]  Patient tolerated treatment well  []  Patient limited by fatigue  []  Patient limited by pain   []  Patient limited by other medical complications  []  Other:     Assessment: Patient is participating in OT s/p left shoulder scope and is progressing nicely towards goals. Demonstrates improved passive and active range of motion since evaluation. Relates she is completing independent bathing and dressing with less pain complaints. Continues to have difficulty with reaching actively over shoulder level and notices fatigue and weakness. Requires continued OT 2x a week for 4 additional weeks to focus on improving AROM and strength as tolerated. Areas for Improvement: impaired ROM, impaired strength and pain  Prognosis: good    GOALS:  Patient Goal: To be able to complete self care without pain or difficulty and complete housework and work duties. Short Term Goals:  Time Frame: 4 weeks  1. Pt. Will demo independence with HEP for left shoulder for improved ROM and eventual strength for return to daily routine. GOAL MET Patient reports she tries to complete stretches everyday. No questions regarding technique. CONTINUE GOAL   2. Pt. Will demo improved PROM left shoulder flexion= 150, abd=90, ER= 65 for ease with bathing and dressing. GOAL MET Demonstrates shoulder flexion 153, abduction 120, ER 65 degrees with report bathing and dressing activities are easier. REVISE GOAL Patient will demonstrate improved PROM left shoulder flexion= 160, abduction= 130, ER= 70 for ease with bathing and dressing. 3. Pt. Will report decreased pain in left shoulder to no greater than 4/10 with movement for ease with work tasks. GOAL NOT MET Patient reports any reaching away from the body increases pain, approximately 8-9/10 above shoulder level. CONTINUE GOAL     Long Term Goals:  Time Frame: 4 weeks  1. Pt. Will demo functional AROM left shoulder for independent return to all self care, housework, and work.  GOAL MET Reports she is completing light housework and self care. REVISE GOAL Patient will demonstrate AROM left shoulder greater than 100 degrees flexion, 85 degrees abduction, 75 degrees ER with elbow at side to increase ease in reaching into overhead cupboards. 2. Pt. Will be able to lift a 5# bag of groceries with LUE to carry into house without difficulty. GOAL NOT MET Reports she continues to have difficulty and significant weakness with lifting activities. CONTINUE GOAL   3. Pt. Will be able to push up from a chair with BUE without pain in left shoulder. GOAL MET      Patient Education:   []  HEP/Education Completed: Plan of Care, Goals, encouraged pt. To ice her shoulder in the evening after work if painful   Medbridge Access HEP: scap pinches, scap depression, supine dowel for ER, table slides  []  No new Education completed  [x]  Reviewed HEP and plan of care to continue. Scheduled additional appointments. [x]  Patient verbalized and/or demonstrated understanding of education provided. []  Patient unable to verbalize and/or demonstrate understanding of education provided. Will continue education. [x]  Barriers to learning: none    PLAN:  Treatment Recommendations: Strengthening, Range of Motion, Manual Therapy - Soft Tissue Mobilization and Home Exercise Program    []  Plan of care initiated. Plan to see patient 2 times per week for 6 weeks to address the treatment planned outlined above. []  Continue with current plan of care  [x]  Modify plan of care as follows: Continue 2x a week for 4 additional weeks until March 30th.   []  Hold pending physician visit  []  Discharge    Time In 1610   Time Out 1650       Timed Code Minutes: Minutes Units   ADL (55735)     Aquatics (59822)     Manual Therapy (40889)     Neuro (35298)     Th. Activities (60987)     Th. Exercise (29524) 40 3   Wheelchair Mgmt (88067)     Cognitive Function (1st 15 min - 70736)     Cognitive Function (per sub.  15 min - 95678) Ultrasound (88213)     Ionto (02607)     Manual E-Stim (59675)          TOTAL TREATMENT TIME: 40 minutes       KARINA VILCHIS/CECIL #[id number]

## 2021-03-04 ENCOUNTER — HOSPITAL ENCOUNTER (OUTPATIENT)
Dept: OCCUPATIONAL THERAPY | Age: 66
Setting detail: THERAPIES SERIES
Discharge: HOME | End: 2021-03-04
Payer: COMMERCIAL

## 2021-03-04 PROCEDURE — 97110 THERAPEUTIC EXERCISES: CPT

## 2021-03-04 NOTE — PROGRESS NOTES
[address] THERAPY  [] EVALUATION  [x] DAILY NOTE (LAND) [] DAILY NOTE (AQUATIC )   [] PROGRESS NOTE [] DISCHARGE NOTE    [x] OUTPATIENT REHABILITATION [facility]   [] [place] [address]    [] [facility]   [] [place]    Date: 3/4/2021  Patient Name:  Jose L Perez  : [birth year]  MRN: [id number]  CSN: [id number]    Referring Practitioner Shelly Goldberg MD   Diagnosis Impingement syndrome of left shoulder [M75.42]  Strain of muscle(s) and tendon(s) of the rotator cuff of left shoulder, initial encounter [S46.012A]    Treatment Diagnosis Left shoulder scope 21   Date of Evaluation 21      Functional Outcome Measure Used UEFS   Functional Outcome Score 50 (21); 63/80 (3/2/21)      Insurance: Primary: Payor: Perla Olmos /  /  / ,   Secondary:    Authorization Information: No visit limit, modalities covered   Visit # 10, 1/10;  PN completed 3/2/21   Visits Allowed:    Recertification Date: 2021   Physician Follow-Up: 2021   Physician Orders: Deon Moise, PROM left shoulder/ Heat/US/Massage; Increase strength and ROM   Pertinent History: Pt. Had been having pain for 9 months in left shoulder after having to use crutches due to a hip issue. Pt. Had surgery 21 for a shoulder scope. Pt. States she did not have RC tear but some fraying that the doctor cleaned up. Pt. Not wearing a sling today and was told she could wean out of it. SUBJECTIVE:   Patient reports shoulder was not too bad until she came and did pulleys today. Reports 2/10 pain in shoulder        TREATMENT   Precautions:    Pain: 2/10     X in shaded column indicates Activity Completed Today   Modalities Parameters/  Location  Notes/Comments                     Manual Therapy Time/  Technique  Notes/Comments   Supine PROM to L shoulder all motions to tolerance  x Good motion today compared to last time this therapist saw pt.    Sidelying manual massage to L upper trap, levator scap, medial scap border; STM to deltoid      Gentle scap and [address] mobs      Exercises   Sets/  Sec Reps  Notes/Comments   Sidelying sleeper stretch 20 sec 3 x    Manual pec stretch by therapist  10 sec 5  Stretch off of the edge of the table   Supine dowel for chest press 1 10     Supine dowel for flexion full arc 1 10     Scap pinches 3 sec hold 10     Scapular depression in sidelying 3 sec hold 10     Supine active ceiling press 1 10 x    Supine scapular punch 1 10 x    Supine ceiling circles with 1# 1 15 ea. way x Good tolerance with 1# added today   Sidelying ER no weight with towel roll under elbow 1 10 x    Pulleys for shoulder flexion and scaption  10 ea x Warm up   Standing wall slide left UE only 1 10     Supine flexion from side to overhead 1 10 x Pt. States it does not hurt, just feels weak   Standing for arcs on wall to tolerance  10 x    biodex 120 speed 2 min. Backward only  10 x           Activities Time    Notes/Comments   Trialed kinesiotape \"I\" strip anterior to posterior for mechanical correction   I strip still in place. Added a Y strip to deltoid for inhibition. Specific Interventions Next Treatment: PROM/AAROM/AROM as tolerated, STM as needed, gentle strengthening when able, modalities as needed    Activity/Treatment Tolerance:  [x]  Patient tolerated treatment well  []  Patient limited by fatigue  []  Patient limited by pain   []  Patient limited by other medical complications  []  Other:     Assessment: Continues to progress toward goals. Tolerated addition of 1# weight for ceiling circles as well as biodex today. Areas for Improvement: impaired ROM, impaired strength and pain  Prognosis: good    GOALS:  Patient Goal: To be able to complete self care without pain or difficulty and complete housework and work duties. Short Term Goals:  Time Frame: 4 weeks  1. Pt.  Will demo independence with HEP for left shoulder for improved ROM and eventual strength for return to daily routine. GOAL MET Patient reports she tries to complete stretches everyday. No questions regarding technique. CONTINUE GOAL   2. Pt. Will demo improved PROM left shoulder flexion= 150, abd=90, ER= 65 for ease with bathing and dressing. GOAL MET Demonstrates shoulder flexion 153, abduction 120, ER 65 degrees with report bathing and dressing activities are easier. REVISE GOAL Patient will demonstrate improved PROM left shoulder flexion= 160, abduction= 130, ER= 70 for ease with bathing and dressing. 3. Pt. Will report decreased pain in left shoulder to no greater than 4/10 with movement for ease with work tasks. GOAL NOT MET Patient reports any reaching away from the body increases pain, approximately 8-9/10 above shoulder level. CONTINUE GOAL     Long Term Goals:  Time Frame: 4 weeks  1. Pt. Will demo functional AROM left shoulder for independent return to all self care, housework, and work. GOAL MET Reports she is completing light housework and self care. REVISE GOAL Patient will demonstrate AROM left shoulder greater than 100 degrees flexion, 85 degrees abduction, 75 degrees ER with elbow at side to increase ease in reaching into overhead cupboards. 2. Pt. Will be able to lift a 5# bag of groceries with LUE to carry into house without difficulty. GOAL NOT MET Reports she continues to have difficulty and significant weakness with lifting activities. CONTINUE GOAL   3. Pt. Will be able to push up from a chair with BUE without pain in left shoulder. GOAL MET      Patient Education:   []  HEP/Education Completed: Plan of Care, Goals, encouraged pt.  To ice her shoulder in the evening after work if painful   Medbridge Access HEP: scap pinches, scap depression, supine dowel for ER, table slides; 3/4/21 added supine full arc shoulder flexion from side to overhead and sidelying ER to HEP  []  No new Education completed  [x]  Reviewed HEP and added supine shoulder flexion and sidelying ER to HEP    [x]  Patient verbalized and/or demonstrated understanding of education provided. []  Patient unable to verbalize and/or demonstrate understanding of education provided. Will continue education. [x]  Barriers to learning: none    PLAN:  Treatment Recommendations: Strengthening, Range of Motion, Manual Therapy - Soft Tissue Mobilization and Home Exercise Program    []  Plan of care initiated. Plan to see patient 2 times per week for 6 weeks to address the treatment planned outlined above. []  Continue with current plan of care  [x]  Modify plan of care as follows: Continue 2x a week for 4 additional weeks until March 30th.   []  Hold pending physician visit  []  Discharge    Time In 1615   Time Out 1650       Timed Code Minutes: Minutes Units   ADL (21665)     Aquatics (57847)     Manual Therapy (67667)     Neuro (56907)     Th. Activities (52555)     Th. Exercise (34343) 35 2   Wheelchair Mgmt (62298)     Cognitive Function (1st 15 min - 42431)     Cognitive Function (per sub.  15 min - 21625)     Ultrasound (70994)     Ionto (48281)     Manual E-Stim (06099)          TOTAL TREATMENT TIME: 35 minutes       Laura 70, OTR/L 7521

## 2021-03-05 ENCOUNTER — TELEPHONE (OUTPATIENT)
Dept: FAMILY MEDICINE CLINIC | Age: 66
End: 2021-03-05

## 2021-03-05 ENCOUNTER — HOSPITAL ENCOUNTER (OUTPATIENT)
Age: 66
Discharge: HOME | End: 2021-03-05
Payer: COMMERCIAL

## 2021-03-05 DIAGNOSIS — D50.9 IRON DEFICIENCY ANEMIA, UNSPECIFIED IRON DEFICIENCY ANEMIA TYPE: ICD-10-CM

## 2021-03-05 DIAGNOSIS — D50.9 IRON DEFICIENCY ANEMIA, UNSPECIFIED IRON DEFICIENCY ANEMIA TYPE: Primary | ICD-10-CM

## 2021-03-05 DIAGNOSIS — D64.9 LOW HEMOGLOBIN: ICD-10-CM

## 2021-03-05 LAB
ANISOCYTOSIS: PRESENT
BASOPHILS # BLD: 0.4 %
BASOPHILS ABSOLUTE: 0 THOU/MM3 (ref 0–0.1)
EOSINOPHIL # BLD: 2.3 %
EOSINOPHILS ABSOLUTE: 0.1 THOU/MM3 (ref 0–0.4)
ERYTHROCYTE [DISTWIDTH] IN BLOOD BY AUTOMATED COUNT: 24.8 % (ref 11.5–14.5)
ERYTHROCYTE [DISTWIDTH] IN BLOOD BY AUTOMATED COUNT: 78.7 FL (ref 35–45)
HCT VFR BLD CALC: 41.8 % (ref 37–47)
HEMOGLOBIN: 11.9 GM/DL (ref 12–16)
HYPOCHROMIA: PRESENT
IMMATURE GRANS (ABS): 0.01 THOU/MM3 (ref 0–0.07)
IMMATURE GRANULOCYTES: 0.2 %
IRON: 184 UG/DL (ref 50–170)
LYMPHOCYTES # BLD: 33.3 %
LYMPHOCYTES ABSOLUTE: 1.6 THOU/MM3 (ref 1–4.8)
MCH RBC QN AUTO: 25.3 PG (ref 26–33)
MCHC RBC AUTO-ENTMCNC: 28.5 GM/DL (ref 32.2–35.5)
MCV RBC AUTO: 88.7 FL (ref 81–99)
MONOCYTES # BLD: 9.5 %
MONOCYTES ABSOLUTE: 0.4 THOU/MM3 (ref 0.4–1.3)
NUCLEATED RED BLOOD CELLS: 0 /100 WBC
PLATELET # BLD: 196 THOU/MM3 (ref 130–400)
PLATELET ESTIMATE: ADEQUATE
PMV BLD AUTO: 10.2 FL (ref 9.4–12.4)
RBC # BLD: 4.71 MILL/MM3 (ref 4.2–5.4)
SCAN OF BLOOD SMEAR: NORMAL
SEG NEUTROPHILS: 54.3 %
SEGMENTED NEUTROPHILS ABSOLUTE COUNT: 2.6 THOU/MM3 (ref 1.8–7.7)
WBC # BLD: 4.7 THOU/MM3 (ref 4.8–10.8)

## 2021-03-05 PROCEDURE — 85025 COMPLETE CBC W/AUTO DIFF WBC: CPT

## 2021-03-05 PROCEDURE — 83540 ASSAY OF IRON: CPT

## 2021-03-05 PROCEDURE — 36415 COLL VENOUS BLD VENIPUNCTURE: CPT

## 2021-03-05 NOTE — TELEPHONE ENCOUNTER
----- Message from Otis Hummel MD sent at 3/5/2021 12:19 PM EST -----  Anemia improving with Hgb now up to 11.9. Decrease iron supplement to once daily as serum iron is now a little high. Recheck CBC, iron in 3 months.  CG

## 2021-03-08 RX ORDER — TRAZODONE HYDROCHLORIDE 50 MG/1
TABLET ORAL
Qty: 30 TABLET | Refills: 5 | Status: SHIPPED | OUTPATIENT
Start: 2021-03-08 | End: 2021-08-26

## 2021-03-08 NOTE — TELEPHONE ENCOUNTER
Rx sent to pharmacy as below:    Requested Prescriptions     Signed Prescriptions Disp Refills    traZODone (DESYREL) 50 MG tablet 30 tablet 5     Sig: TAKE ONE TABLET BY MOUTH EVERY EVENING AS NEEDED FOR SLEEP     Authorizing Provider: Carlos Mckeon           Electronically signed by Bianca Cash MD on 3/8/2021 at 11:13 AM

## 2021-03-08 NOTE — TELEPHONE ENCOUNTER
This medication refill is regarding a electronic request by [address] Requested Prescriptions     Pending Prescriptions Disp Refills    traZODone (DESYREL) 50 MG tablet [Pharmacy Med Name: TRAZODONE HCL 50MG TABS] 30 tablet 5     Sig: TAKE ONE TABLET BY MOUTH EVERY EVENING AS NEEDED FOR SLEEP       Date of last visit: 10/7/2020  Date of next visit: None  Date of last refill: 11/10/2020  30/5    Rx verified, ordered and set to EP.

## 2021-03-09 ENCOUNTER — HOSPITAL ENCOUNTER (OUTPATIENT)
Dept: OCCUPATIONAL THERAPY | Age: 66
Setting detail: THERAPIES SERIES
Discharge: HOME | End: 2021-03-09
Payer: COMMERCIAL

## 2021-03-09 PROCEDURE — 97110 THERAPEUTIC EXERCISES: CPT

## 2021-03-09 NOTE — PROGRESS NOTES
[address] THERAPY  [] EVALUATION  [x] DAILY NOTE (LAND) [] DAILY NOTE (AQUATIC )   [] PROGRESS NOTE [] DISCHARGE NOTE    [x] OUTPATIENT REHABILITATION [facility]   [] [place] [address]    [] [facility]   [] Prateek Pierce    Date: 3/9/2021  Patient Name:  Jenny Montenegro  : [birth year]  MRN: [id number]  CSN: [id number]    Referring Practitioner Dianne King MD   Diagnosis Impingement syndrome of left shoulder [M75.42]  Strain of muscle(s) and tendon(s) of the rotator cuff of left shoulder, initial encounter [S46.012A]    Treatment Diagnosis Left shoulder scope 21   Date of Evaluation 21      Functional Outcome Measure Used UEFS   Functional Outcome Score 50 (21); 63/80 (3/2/21)      Insurance: Primary: Payor: Aydee Bowen /  /  / ,   Secondary:    Authorization Information: No visit limit, modalities covered   Visit # 11, 2/10;  PN completed 3/2/21   Visits Allowed:    Recertification Date: 2021   Physician Follow-Up: 2021   Physician Orders: [facility], PROM left shoulder/ Heat/US/Massage; Increase strength and ROM   Pertinent History: Pt. Had been having pain for 9 months in left shoulder after having to use crutches due to a hip issue. Pt. Had surgery 21 for a shoulder scope. Pt. States she did not have RC tear but some fraying that the doctor cleaned up. Pt. Not wearing a sling today and was told she could wean out of it. SUBJECTIVE:   Patient reports shoulder feels pretty good today.          TREATMENT   Precautions:    Pain: 1/10     X in shaded column indicates Activity Completed Today   Modalities Parameters/  Location  Notes/Comments                     Manual Therapy Time/  Technique  Notes/Comments   Supine PROM to L shoulder all motions to tolerance  xX Good motion today   Sidelying manual massage to L upper trap, levator scap, medial scap border  xX    Gentle scap mobs  xX    Exercises Sets/  Sec Reps  Notes/Comments   Sidelying sleeper stretch 20 sec 4 xX    Manual pec stretch by therapist  10 sec 5  Stretch off of the edge of the table   Supine dowel for chest press 1 10     Supine dowel for flexion full arc 1 10 xX    Scap pinches 3 sec hold 10     Scapular depression in sidelying 3 sec hold 10     Supine chest press with 1# 1 10 xX    Supine scapular punch with 1# 1 10 xX    Supine ceiling circles with 1# 1 [address] ea[address] way     Supine alphabet with 1#   xX    Sidelying ER no weight with towel roll under elbow 1 10     Pulleys for shoulder flexion and scaption  10 ea xX Warm up   Wirt theraband for rockwoods 1 10 ea xX Good tolerance   Standing wall slide left UE only 1 10 xX    Supine flexion from side to overhead 1 10  Pt. States it does not hurt, just feels weak   Standing for arcs on wall to tolerance  10     biodex 90 speed 3 min. Backward only  10 xX           Activities Time    Notes/Comments   Trialed kinesiotape \"I\" strip anterior to posterior for mechanical correction   I strip still in place. Added a Y strip to deltoid for inhibition. Specific Interventions Next Treatment: PROM/AAROM/AROM as tolerated, STM as needed, gentle strengthening when able, modalities as needed    Activity/Treatment Tolerance:  [x]  Patient tolerated treatment well  []  Patient limited by fatigue  []  Patient limited by pain   []  Patient limited by other medical complications  []  Other:     Assessment: Continues to progress toward goals. Added peach theraband today with good tolerance - will wait til next visit to add to HEP if this did not increase pain  Areas for Improvement: impaired ROM, impaired strength and pain  Prognosis: good    GOALS:  Patient Goal: To be able to complete self care without pain or difficulty and complete housework and work duties. Short Term Goals:  Time Frame: 4 weeks  1. Pt.  Will demo independence with HEP for left shoulder for improved ROM and eventual strength for return to daily routine. GOAL MET Patient reports she tries to complete stretches everyday. No questions regarding technique. CONTINUE GOAL   2. Pt. Will demo improved PROM left shoulder flexion= 150, abd=90, ER= 65 for ease with bathing and dressing. GOAL MET Demonstrates shoulder flexion 153, abduction 120, ER 65 degrees with report bathing and dressing activities are easier. REVISE GOAL Patient will demonstrate improved PROM left shoulder flexion= 160, abduction= 130, ER= 70 for ease with bathing and dressing. 3. Pt. Will report decreased pain in left shoulder to no greater than 4/10 with movement for ease with work tasks. GOAL NOT MET Patient reports any reaching away from the body increases pain, approximately 8-9/10 above shoulder level. CONTINUE GOAL     Long Term Goals:  Time Frame: 4 weeks  1. Pt. Will demo functional AROM left shoulder for independent return to all self care, housework, and work. GOAL MET Reports she is completing light housework and self care. REVISE GOAL Patient will demonstrate AROM left shoulder greater than 100 degrees flexion, 85 degrees abduction, 75 degrees ER with elbow at side to increase ease in reaching into overhead cupboards. 2. Pt. Will be able to lift a 5# bag of groceries with LUE to carry into house without difficulty. GOAL NOT MET Reports she continues to have difficulty and significant weakness with lifting activities. CONTINUE GOAL   3. Pt. Will be able to push up from a chair with BUE without pain in left shoulder. GOAL MET      Patient Education:   []  HEP/Education Completed: Plan of Care, Goals, encouraged pt.  To ice her shoulder in the evening after work if painful   Medbridge Access HEP: scap pinches, scap depression, supine dowel for ER, table slides; 3/4/21 added supine full arc shoulder flexion from side to overhead and sidelying ER to HEP  [x]  No new Education completed  []  Reviewed HEP   []  Patient verbalized and/or demonstrated understanding of education provided. []  Patient unable to verbalize and/or demonstrate understanding of education provided. Will continue education. [x]  Barriers to learning: none    PLAN:  Treatment Recommendations: Strengthening, Range of Motion, Manual Therapy - Soft Tissue Mobilization and Home Exercise Program    []  Plan of care initiated. Plan to see patient 2 times per week for 6 weeks to address the treatment planned outlined above. []  Continue with current plan of care  [x]  Modify plan of care as follows: Continue 2x a week for 4 additional weeks until March 30th.   []  Hold pending physician visit  []  Discharge    Time In 1630   Time Out 1710       Timed Code Minutes: Minutes Units   ADL ([id number])     Aquatics (24231)     Manual Therapy (99509)     Neuro (29089)     Th. Activities (84103)     Th. Exercise (03163) 40 3   Wheelchair Mgmt (60492)     Cognitive Function (1st 15 min - 13319)     Cognitive Function (per sub.  15 min - 87628)     Ultrasound (87343)     Ionto (86729)     Manual E-Stim (23815)          TOTAL TREATMENT TIME: 40 minutes       Laura 70, OTR/L 5693

## 2021-03-11 ENCOUNTER — HOSPITAL ENCOUNTER (OUTPATIENT)
Dept: OCCUPATIONAL THERAPY | Age: 66
Setting detail: THERAPIES SERIES
Discharge: HOME | End: 2021-03-11
Payer: COMMERCIAL

## 2021-03-11 PROCEDURE — 97110 THERAPEUTIC EXERCISES: CPT

## 2021-03-11 NOTE — PROGRESS NOTES
[address] THERAPY  [] EVALUATION  [x] DAILY NOTE (LAND) [] DAILY NOTE (AQUATIC )   [] PROGRESS NOTE [] DISCHARGE NOTE    [x] OUTPATIENT REHABILITATION [facility]   [] [place] [address]    [] [facility]   [] Gabi Knight    Date: 3/11/2021  Patient Name:  Niki Cole  : [birth year]  MRN: [id number]  CSN: [id number]    Referring Practitioner Bree Maldonado MD   Diagnosis Impingement syndrome of left shoulder [M75.42]  Strain of muscle(s) and tendon(s) of the rotator cuff of left shoulder, initial encounter [S46.012A]    Treatment Diagnosis Left shoulder scope 21   Date of Evaluation 21      Functional Outcome Measure Used UEFS   Functional Outcome Score 50 (21); 63/80 (3/2/21)      Insurance: Primary: Payor: Nolberto Bowen /  /  / ,   Secondary:    Authorization Information: No visit limit, modalities covered   Visit # 12, 3/10;  PN completed 3/2/21   Visits Allowed:    Recertification Date: 2021   Physician Follow-Up: 2021   Physician Orders: Clarissa Gamboa, PROM left shoulder/ Heat/US/Massage; Increase strength and ROM   Pertinent History: Pt. Had been having pain for 9 months in left shoulder after having to use crutches due to a hip issue. Pt. Had surgery 21 for a shoulder scope. Pt. States she did not have RC tear but some fraying that the doctor cleaned up. Pt. Not wearing a sling today and was told she could wean out of it. SUBJECTIVE:   Patient reports that she had a little muscle aching after starting theraband last session, but nothing terrible.          TREATMENT   Precautions:    Pain: 10     X in shaded column indicates Activity Completed Today   Modalities Parameters/  Location  Notes/Comments                     Manual Therapy Time/  Technique  Notes/Comments   Supine PROM to L shoulder all motions to tolerance  x Good motion today   Sidelying manual massage to L upper trap, levator scap, medial scap border      Gentle scap mobs      Exercises   Sets/  Sec Reps  Notes/Comments   Sidelying sleeper stretch 20 sec 4 x    Manual pec stretch by therapist  10 sec 5  Stretch off of the edge of the table   Supine dowel for chest press 1 10     Supine dowel for flexion full arc 1 10     Scap pinches 3 sec hold 10     Standing dowel for IR up back 3 sec hold 10 x    Supine chest press with 1# 1 10 x    Supine scapular punch with 1# 1 10 x    Supine ceiling circles with 1# 1 15 ea. way x    Supine alphabet with 1#       Sidelying ER 1# with towel roll under elbow 1 10 x    Pulleys for shoulder flexion and scaption  10 ea x Warm up   Brown theraband for rockwoods 1 10 ea x Good tolerance   Standing wall slide left UE only 1 10     Supine flexion from side to overhead 1# 1 10 x    Standing for arcs on wall to tolerance  10 x    biodex 90 speed 3 min. Backward and 2 min forward  10 x           Activities Time    Notes/Comments   Trialed kinesiotape \"I\" strip anterior to posterior for mechanical correction   I strip still in place. Added a Y strip to deltoid for inhibition. Specific Interventions Next Treatment: PROM/AAROM/AROM as tolerated, STM as needed, gentle strengthening when able, modalities as needed    Activity/Treatment Tolerance:  [x]  Patient tolerated treatment well  []  Patient limited by fatigue  []  Patient limited by pain   []  Patient limited by other medical complications  []  Other:     Assessment: Continues to progress toward goals. Added peach theraband to HEP today   Areas for Improvement: impaired ROM, impaired strength and pain  Prognosis: good    GOALS:  Patient Goal: To be able to complete self care without pain or difficulty and complete housework and work duties. Short Term Goals:  Time Frame: 4 weeks  1. Pt. Will demo independence with HEP for left shoulder for improved ROM and eventual strength for return to daily routine.  GOAL MET Patient reports she tries to complete stretches everyday. No questions regarding technique. CONTINUE GOAL   2. Pt. Will demo improved PROM left shoulder flexion= 150, abd=90, ER= 65 for ease with bathing and dressing. GOAL MET Demonstrates shoulder flexion 153, abduction 120, ER 65 degrees with report bathing and dressing activities are easier. REVISE GOAL Patient will demonstrate improved PROM left shoulder flexion= 160, abduction= 130, ER= 70 for ease with bathing and dressing. 3. Pt. Will report decreased pain in left shoulder to no greater than 4/10 with movement for ease with work tasks. GOAL NOT MET Patient reports any reaching away from the body increases pain, approximately 8-9/10 above shoulder level. CONTINUE GOAL     Long Term Goals:  Time Frame: 4 weeks  1. Pt. Will demo functional AROM left shoulder for independent return to all self care, housework, and work. GOAL MET Reports she is completing light housework and self care. REVISE GOAL Patient will demonstrate AROM left shoulder greater than 100 degrees flexion, 85 degrees abduction, 75 degrees ER with elbow at side to increase ease in reaching into overhead cupboards. 2. Pt. Will be able to lift a 5# bag of groceries with LUE to carry into house without difficulty. GOAL NOT MET Reports she continues to have difficulty and significant weakness with lifting activities. CONTINUE GOAL   3. Pt. Will be able to push up from a chair with BUE without pain in left shoulder. GOAL MET      Patient Education:   []  HEP/Education Completed: Plan of Care, Goals, encouraged pt.  To ice her shoulder in the evening after work if painful   Medbridge Access HEP: scap pinches, scap depression, supine dowel for ER, table slides; 3/4/21 added supine full arc shoulder flexion from side to overhead and sidelying ER to HEP; 3/11/21 added rockwoods with peach band to HEP  []  No new Education completed  [x]  Added peach theraband for rockwoods to HEP   [x]  Patient verbalized and/or demonstrated understanding of education provided. []  Patient unable to verbalize and/or demonstrate understanding of education provided. Will continue education. [x]  Barriers to learning: none    PLAN:  Treatment Recommendations: Strengthening, Range of Motion, Manual Therapy - Soft Tissue Mobilization and Home Exercise Program    []  Plan of care initiated. Plan to see patient 2 times per week for 6 weeks to address the treatment planned outlined above. []  Continue with current plan of care  [x]  Modify plan of care as follows: Continue 2x a week for 4 additional weeks until March 30th.   []  Hold pending physician visit  []  Discharge    Time In 1620   Time Out 1658       Timed Code Minutes: Minutes Units   ADL (91079)     Aquatics (30561)     Manual Therapy (97723)     Neuro (90310)     Th. Activities (66485)     Th. Exercise (57526) 38 3   Wheelchair Mgmt (11544)     Cognitive Function (1st 15 min - 01023)     Cognitive Function (per sub.  15 min - 10355)     Ultrasound (02093)     Ionto (54098)     Manual E-Stim (01377)          TOTAL TREATMENT TIME: 38 minutes       Nicola Vasquez OTR/L 8605

## 2021-03-16 ENCOUNTER — HOSPITAL ENCOUNTER (OUTPATIENT)
Dept: OCCUPATIONAL THERAPY | Age: 66
Setting detail: THERAPIES SERIES
Discharge: HOME | End: 2021-03-16
Payer: COMMERCIAL

## 2021-03-16 PROCEDURE — 97110 THERAPEUTIC EXERCISES: CPT

## 2021-03-16 NOTE — PROGRESS NOTES
[address]ERAP[address]  [] EVALUATION  [x] DAILY NOTE (LAND) [] DAILY NOTE (AQUATIC )   [] PROGRESS NOTE [] DISCHARGE NOTE    [x] OUTPATIENT REHABILITATION [facility]   [] [place] [address]    [] [facility]   [] Leighton Chambers    Date: 3/16/2021  Patient Name:  Luz Cheng  : [birth year]  MRN: [id number]  CSN: [id number]    Referring Practitioner Chris Gray MD   Diagnosis Impingement syndrome of left shoulder [M75.42]  Strain of muscle(s) and tendon(s) of the rotator cuff of left shoulder, initial encounter [S46.012A]    Treatment Diagnosis Left shoulder scope 21   Date of Evaluation 21      Functional Outcome Measure Used UEFS   Functional Outcome Score 50 (21); 63/80 (3/2/21)      Insurance: Primary: Payor: Abbie Pollock /  /  / ,   Secondary:    Authorization Information: No visit limit, modalities covered   Visit # 13, 4/10;  PN completed 3/2/21   Visits Allowed:    Recertification Date: 2021   Physician Follow-Up: 2021   Physician Orders: Cristine Albertt, PROM left shoulder/ Heat/US/Massage; Increase strength and ROM   Pertinent History: Pt. Had been having pain for 9 months in left shoulder after having to use crutches due to a hip issue. Pt. Had surgery 21 for a shoulder scope. Pt. States she did not have RC tear but some fraying that the doctor cleaned up. Pt. Not wearing a sling today and was told she could wean out of it. SUBJECTIVE:   Patient reports just mild discomfort in left shoulder today.  States she got a massage yesterday and this helped         TREATMENT   Precautions:    Pain: /10     X in shaded column indicates Activity Completed Today   Modalities Parameters/  Location  Notes/Comments                     Manual Therapy Time/  Technique  Notes/Comments   Supine PROM to L shoulder all motions to tolerance  xX Good motion today   Sidelying manual massage to L upper trap, levator scap, medial scap border      Gentle scap mobs      Exercises   Sets/  Sec Reps  Notes/Comments   Sidelying sleeper stretch 20 sec 4 xX    Standing dowel for IR up back and then extension away from body 3 sec hold 10 ea xX    Supine chest press with 1# 1 10 xX    Supine scapular punch with 1# 1 10 xX    Supine ceiling circles with 1# 1 15 ea. way xX    Supine alphabet with 1#       Sidelying ER 1# with towel roll under elbow 1 10 xX    Pulleys for shoulder flexion and scaption  10 ea xX Warm up   [place] theraband for rock[place] and rows 1 10 ea xX    Peach band bicep and tricep  10 ea xX    Supine Peach band Riivalid exercises 1 10 xX    Standing wall slide left UE only 1 10     Supine flexion from side to overhead 1# 1 10 xX    Standing for arcs on wall to tolerance  10     biodex 75 speed 3 min. Backward and 2 min forward  10 xX           Activities Time    Notes/Comments   Trialed kinesiotape \"I\" strip anterior to posterior for mechanical correction   I strip still in place. Added a Y strip to deltoid for inhibition. Specific Interventions Next Treatment: PROM/AAROM/AROM as tolerated, STM as needed, gentle strengthening when able, modalities as needed    Activity/Treatment Tolerance:  [x]  Patient tolerated treatment well  []  Patient limited by fatigue  []  Patient limited by pain   []  Patient limited by other medical complications  []  Other:     Assessment: Continues to progress toward goals. Additional theraband exercises today with good tolerance   Areas for Improvement: impaired ROM, impaired strength and pain  Prognosis: good    GOALS:  Patient Goal: To be able to complete self care without pain or difficulty and complete housework and work duties. Short Term Goals:  Time Frame: 4 weeks  1. Pt. Will demo independence with HEP for left shoulder for improved ROM and eventual strength for return to daily routine. GOAL MET Patient reports she tries to complete stretches everyday.  No questions regarding technique. CONTINUE GOAL   2. Pt. Will demo improved PROM left shoulder flexion= 150, abd=90, ER= 65 for ease with bathing and dressing. GOAL MET Demonstrates shoulder flexion 153, abduction 120, ER 65 degrees with report bathing and dressing activities are easier. REVISE GOAL Patient will demonstrate improved PROM left shoulder flexion= 160, abduction= 130, ER= 70 for ease with bathing and dressing. 3. Pt. Will report decreased pain in left shoulder to no greater than 4/10 with movement for ease with work tasks. GOAL NOT MET Patient reports any reaching away from the body increases pain, approximately 8-9/10 above shoulder level. CONTINUE GOAL     Long Term Goals:  Time Frame: 4 weeks  1. Pt. Will demo functional AROM left shoulder for independent return to all self care, housework, and work. GOAL MET Reports she is completing light housework and self care. REVISE GOAL Patient will demonstrate AROM left shoulder greater than 100 degrees flexion, 85 degrees abduction, 75 degrees ER with elbow at side to increase ease in reaching into overhead cupboards. 2. Pt. Will be able to lift a 5# bag of groceries with LUE to carry into house without difficulty. GOAL NOT MET Reports she continues to have difficulty and significant weakness with lifting activities. CONTINUE GOAL   3. Pt. Will be able to push up from a chair with BUE without pain in left shoulder. GOAL MET      Patient Education:   []  HEP/Education Completed: Plan of Care, Goals, encouraged pt. To ice her shoulder in the evening after work if painful   Medbridge Access HEP: scap pinches, scap depression, supine dowel for ER, table slides; 3/4/21 added supine full arc shoulder flexion from side to overhead and sidelying ER to HEP; 3/11/21 added rockwoods with peach band to HEP  []  No new Education completed  [x]  Reviewed theraband HEP   [x]  Patient verbalized and/or demonstrated understanding of education provided.   []  Patient unable to verbalize and/or demonstrate understanding of education provided. Will continue education. [x]  Barriers to learning: none    PLAN:  Treatment Recommendations: Strengthening, Range of Motion, Manual Therapy - Soft Tissue Mobilization and Home Exercise Program    []  Plan of care initiated. Plan to see patient 2 times per week for 6 weeks to address the treatment planned outlined above. []  Continue with current plan of care  [x]  Modify plan of care as follows: Continue 2x a week for 4 additional weeks until March 30th.   []  Hold pending physician visit  []  Discharge    Time In 1030   Time Out 1105       Timed Code Minutes: Minutes Units   ADL (88864)     Aquatics (04520)     Manual Therapy (01921)     Neuro (58308)     Th. Activities (94431)     Th. Exercise (66084) 35 2   Wheelchair Mgmt (20455)     Cognitive Function (1st 15 min - 19599)     Cognitive Function (per sub.  15 min - 86551)     Ultrasound (69735)     Ionto (77041)     Manual E-Stim (59190)          TOTAL TREATMENT TIME: 35 minutes       Laura 70, OTR/L 9025

## 2021-03-18 ENCOUNTER — HOSPITAL ENCOUNTER (OUTPATIENT)
Dept: OCCUPATIONAL THERAPY | Age: 66
Setting detail: THERAPIES SERIES
Discharge: HOME | End: 2021-03-18
Payer: COMMERCIAL

## 2021-03-18 PROCEDURE — 97110 THERAPEUTIC EXERCISES: CPT

## 2021-03-18 NOTE — PROGRESS NOTES
[address] THER[address]Y  [] EVALUATION  [x] DAILY NOTE (LAND) [] DAILY NOTE (AQUATIC )   [] PROGRESS NOTE [] DISCHARGE NOTE    [x] OUTPATIENT REHABILITATION [facility]   [] [place] [address]    [] [facility]   [] Chilton Cheadle    Date: 3/18/2021  Patient Name:  Jose L Perez  : [birth year]4  MRN: [id number]  CSN: [id number]    Referring Practitioner Shelly Goldberg MD   Diagnosis Impingement syndrome of left shoulder [M75.42]  Strain of muscle(s) and tendon(s) of the rotator cuff of left shoulder, initial encounter [S46.012A]    Treatment Diagnosis Left shoulder scope 21   Date of Evaluation 21      Functional Outcome Measure Used UEFS   Functional Outcome Score 50 (21); 63/80 (3/2/21)      Insurance: Primary: Payor: Perla Olmos /  /  / ,   Secondary:    Authorization Information: No visit limit, modalities covered   Visit # 14, 5/10;  PN completed 3/2/21   Visits Allowed:    Recertification Date: 2021   Physician Follow-Up: 2021   Physician Orders: Deon Moise, PROM left shoulder/ Heat/US/Massage; Increase strength and ROM   Pertinent History: Pt. Had been having pain for 9 months in left shoulder after having to use crutches due to a hip issue. Pt. Had surgery 21 for a shoulder scope. Pt. States she did not have RC tear but some fraying that the doctor cleaned up. Pt. Not wearing a sling today and was told she could wean out of it. SUBJECTIVE: Patient reports she is able to do more activity at home and at work with less pain. Difficulty at times with backward motion to pull up her pants.          TREATMENT   Precautions:    Pain: /10     X in shaded column indicates Activity Completed Today   Modalities Parameters/  Location  Notes/Comments                     Manual Therapy Time/  Technique  Notes/Comments   Supine PROM to L shoulder all motions to tolerance  xX Good motion today   Sidelying manual massage to L upper trap, levator scap, medial scap border      Gentle scap mobs      Exercises   Sets/  Sec Reps  Notes/Comments   Sidelying sleeper stretch 20 sec 4 xX    Standing dowel for IR up back and then extension away from body 3 sec hold 10 ea xX    Supine chest press with 1# 1 10 xX    Supine scapular punch with 1# 1 10 xX    Supine ceiling circles with 1# 1 15 ea. way xX    Supine alphabet with 1#       Sidelying ER 1# with towel roll under elbow 1 10 xX    Pulleys for shoulder flexion and scaption  10 ea xX Warm up   [place] theraband for rockwoods and rows 1 10 ea xX    Peach band bicep and tricep  10 ea xX    Supine Peach band Riivalid exercises 3 10 xX    Standing wall slide left UE only 1 10     Supine flexion  1 10 xX Modified - No weight from approximately 80 degrees to overhead to avoid pain. Standing for arcs on wall to tolerance  10     biodex 75 speed 4 min. Backward   10 xX           Activities Time    Notes/Comments   Trialed kinesiotape \"I\" strip anterior to posterior for mechanical correction   I strip still in place. Added a Y strip to deltoid for inhibition. Specific Interventions Next Treatment: PROM/AAROM/AROM as tolerated, STM as needed, gentle strengthening when able, modalities as needed    Activity/Treatment Tolerance:  [x]  Patient tolerated treatment well  []  Patient limited by fatigue  []  Patient limited by pain   []  Patient limited by other medical complications  []  Other:     Assessment: Continues to progress toward goals. Additional theraband exercises today with good tolerance   Areas for Improvement: impaired ROM, impaired strength and pain  Prognosis: good    GOALS:  Patient Goal: To be able to complete self care without pain or difficulty and complete housework and work duties. Short Term Goals:  Time Frame: 4 weeks  1. Pt. Will demo independence with HEP for left shoulder for improved ROM and eventual strength for return to daily routine.  GOAL MET Patient understanding of education provided. []  Patient unable to verbalize and/or demonstrate understanding of education provided. Will continue education. [x]  Barriers to learning: none    PLAN:  Treatment Recommendations: Strengthening, Range of Motion, Manual Therapy - Soft Tissue Mobilization and Home Exercise Program    []  Plan of care initiated. Plan to see patient 2 times per week for 6 weeks to address the treatment planned outlined above. [x]  Continue with current plan of care  []  Modify plan of care as follows: Continue 2x a week for 4 additional weeks until March 30th.   []  Hold pending physician visit  []  Discharge    Time In 1600   Time Out 1645       Timed Code Minutes: Minutes Units   ADL (75854)     Aquatics (04561)     Manual Therapy (79627)     Neuro (11980)     Th. Activities (49778)     Th. Exercise (30953) 45 3   Wheelchair Mgmt (47175)     Cognitive Function (1st 15 min - 58592)     Cognitive Function (per sub.  15 min - 57846)     Ultrasound (25465)     Ionto (82709)     Manual E-Stim (75968)          TOTAL TREATMENT TIME: 39 minutes       KARINA VILCHIS/CECIL #[id number]

## 2021-03-23 ENCOUNTER — HOSPITAL ENCOUNTER (OUTPATIENT)
Dept: OCCUPATIONAL THERAPY | Age: 66
Setting detail: THERAPIES SERIES
Discharge: HOME | End: 2021-03-23
Payer: COMMERCIAL

## 2021-03-23 PROCEDURE — 97110 THERAPEUTIC EXERCISES: CPT

## 2021-03-23 NOTE — PROGRESS NOTES
[address]  [] EVALUATION  [x] DAILY NOTE (LAND) [] DAILY NOTE (AQUATIC )   [] PROGRESS NOTE [] DISCHARGE NOTE    [x] OUTPATIENT REHABILITATION [facility]   [] [place] [address]    [] [facility]   [facility]    Date: 3/23/2021  Patient Name:  Les Richardson  : [age]  MRN: [id number]  CSN: [id number]    Referring Practitioner Brynn Moura MD   Diagnosis Impingement syndrome of left shoulder [M75.42]  Strain of muscle(s) and tendon(s) of the rotator cuff of left shoulder, initial encounter [S46.012A]    Treatment Diagnosis Left shoulder scope 21   Date of Evaluation 21      Functional Outcome Measure Used UEFS   Functional Outcome Score 50 (21); 63/80 (3/2/21)      Insurance: Primary: Payor: Phill Jimenez /  /  / ,   Secondary:    Authorization Information: No visit limit, modalities covered   Visit # 15, 6/10;  PN completed 3/2/21   Visits Allowed:    Recertification Date: 2021   Physician Follow-Up: 2021   Physician Orders: Imelda Green, PROM left shoulder/ Heat/US/Massage; Increase strength and ROM   Pertinent History: Pt. Had been having pain for 9 months in left shoulder after having to use crutches due to a hip issue. Pt. Had surgery 21 for a shoulder scope. Pt. States she did not have RC tear but some fraying that the doctor cleaned up. Pt. Not wearing a sling today and was told she could wean out of it. SUBJECTIVE: Patient reports her shoulder is a \"little ouchy\" today - states she has been working on some things at home.          TREATMENT   Precautions:    Pain: no pain at rest but 3/10 with activity today     X in shaded column indicates Activity Completed Today   Modalities Parameters/  Location  Notes/Comments                     Manual Therapy Time/  Technique  Notes/Comments   Supine PROM to L shoulder all motions to tolerance  x Good motion today   Sidelying manual massage to L upper trap, levator scap, medial scap border      Gentle scap mobs      Exercises   Sets/  Sec Reps  Notes/Comments   Sidelying sleeper stretch 20 sec 4 x    Standing dowel for IR up back and then extension away from body 3 sec hold 10 ea     Supine chest press with 2# 1 10 x    Supine scapular punch with 2# 1 10 x    Supine ceiling circles with 2# 1 15 ea. way x    Supine alphabet with 1#       Sidelying ER 2# with towel roll under elbow 1 10 x    Pulleys for shoulder flexion and scaption  10 ea x Warm up   [place] theraband for rockwoods and rows 1 15 ea x    Peach band bicep and tricep 1 set 15 ea x    Supine Peach band Riivalid exercises 2 sets 10 x    Standing wall slide left UE only with lift off at top 1 10 x    Supine flexion  1 10  Modified - No weight from approximately 80 degrees to overhead to avoid pain. Standing for arcs on wall to tolerance  10     biodex 75 speed 5 min. Backward   10 x           Activities Time    Notes/Comments   Trialed kinesiotape \"I\" strip anterior to posterior for mechanical correction   I strip still in place. Added a Y strip to deltoid for inhibition. Specific Interventions Next Treatment: PROM/AAROM/AROM as tolerated, STM as needed, gentle strengthening when able, modalities as needed    Activity/Treatment Tolerance:  [x]  Patient tolerated treatment well  []  Patient limited by fatigue  []  Patient limited by pain   []  Patient limited by other medical complications  []  Other:     Assessment: Continues to progress toward goals. Increased PRE's to 2# today and increased reps with theraband with good tolerance today   Areas for Improvement: impaired ROM, impaired strength and pain  Prognosis: good    GOALS:  Patient Goal: To be able to complete self care without pain or difficulty and complete housework and work duties. Short Term Goals:  Time Frame: 4 weeks  1. Pt.  Will demo independence with HEP for left shoulder for improved ROM and eventual strength for return to daily routine. GOAL MET Patient reports she tries to complete stretches everyday. No questions regarding technique. CONTINUE GOAL   2. Pt. Will demo improved PROM left shoulder flexion= 150, abd=90, ER= 65 for ease with bathing and dressing. GOAL MET Demonstrates shoulder flexion 153, abduction 120, ER 65 degrees with report bathing and dressing activities are easier. REVISE GOAL Patient will demonstrate improved PROM left shoulder flexion= 160, abduction= 130, ER= 70 for ease with bathing and dressing. 3. Pt. Will report decreased pain in left shoulder to no greater than 4/10 with movement for ease with work tasks. GOAL NOT MET Patient reports any reaching away from the body increases pain, approximately 8-9/10 above shoulder level. CONTINUE GOAL     Long Term Goals:  Time Frame: 4 weeks  1. Pt. Will demo functional AROM left shoulder for independent return to all self care, housework, and work. GOAL MET Reports she is completing light housework and self care. REVISE GOAL Patient will demonstrate AROM left shoulder greater than 100 degrees flexion, 85 degrees abduction, 75 degrees ER with elbow at side to increase ease in reaching into overhead cupboards. 2. Pt. Will be able to lift a 5# bag of groceries with LUE to carry into house without difficulty. GOAL NOT MET Reports she continues to have difficulty and significant weakness with lifting activities. CONTINUE GOAL   3. Pt. Will be able to push up from a chair with BUE without pain in left shoulder. GOAL MET      Patient Education:   []  HEP/Education Completed: Plan of Care, Goals, encouraged pt.  To ice her shoulder in the evening after work if painful   Medbridge Access HEP: scap pinches, scap depression, supine dowel for ER, table slides; 3/4/21 added supine full arc shoulder flexion from side to overhead and sidelying ER to HEP; 3/11/21 added rockwoods with peach band to HEP  [x]  No new Education completed  []  Reviewed theraband HEP   []  Patient verbalized and/or demonstrated understanding of education provided. []  Patient unable to verbalize and/or demonstrate understanding of education provided. Will continue education. [x]  Barriers to learning: none    PLAN:  Treatment Recommendations: Strengthening, Range of Motion, Manual Therapy - Soft Tissue Mobilization and Home Exercise Program    []  Plan of care initiated. Plan to see patient 2 times per week for 6 weeks to address the treatment planned outlined above. [x]  Continue with current plan of care  []  Modify plan of care as follows: Continue 2x a week for 4 additional weeks until March 30th.   []  Hold pending physician visit  []  Discharge    Time In 1355   Time Out 1435       Timed Code Minutes: Minutes Units   ADL (49791)     Aquatics (32623)     Manual Therapy (30428)     Neuro (94087)     Th. Activities (98752)     Th. Exercise (99141) 40 3   Wheelchair Mgmt (96518)     Cognitive Function (1st 15 min - 15331)     Cognitive Function (per sub.  15 min - 80378)     Ultrasound (55308)     Ionto (96100)     Manual E-Stim (30621)          TOTAL TREATMENT TIME: 40 minutes       Laura 70, OTR/L 4460

## 2021-03-25 ENCOUNTER — HOSPITAL ENCOUNTER (OUTPATIENT)
Dept: OCCUPATIONAL THERAPY | Age: 66
Setting detail: THERAPIES SERIES
Discharge: HOME | End: 2021-03-25
Payer: COMMERCIAL

## 2021-03-25 PROCEDURE — 97110 THERAPEUTIC EXERCISES: CPT

## 2021-03-25 NOTE — PROGRESS NOTES
[address] THERAPY  [] EVALUATION  [x] DAILY NOTE (LAND) [] DAILY NOTE (AQUATIC )   [] PROGRESS NOTE [] DISCHARGE NOTE    [x] OUTPATIENT REHABILITATION [facility]   [] [place] [address]    [] [facility]   [] Eliza Brennan    Date: 3/25/2021  Patient Name:  Lc Stuart  : [birth year]  MRN: [id number]  CSN: [id number]    Referring Practitioner Lakia Escalante MD   Diagnosis Impingement syndrome of left shoulder [M75.42]  Strain of muscle(s) and tendon(s) of the rotator cuff of left shoulder, initial encounter [S46.012A]    Treatment Diagnosis Left shoulder scope 21   Date of Evaluation 21      Functional Outcome Measure Used UEFS   Functional Outcome Score 50 (21); 63/80 (3/2/21)      Insurance: Primary: Payor: Julio Terry /  /  / ,   Secondary:    Authorization Information: No visit limit, modalities covered   Visit # 16, 7/10;  PN completed 3/2/21   Visits Allowed:    Recertification Date: 2021   Physician Follow-Up: 2021   Physician Orders: Kevin May, PROM left shoulder/ Heat/US/Massage; Increase strength and ROM   Pertinent History: Pt. Had been having pain for 9 months in left shoulder after having to use crutches due to a hip issue. Pt. Had surgery 21 for a shoulder scope. Pt. States she did not have RC tear but some fraying that the doctor cleaned up. Pt. Not wearing a sling today and was told she could wean out of it.       SUBJECTIVE: Patient reports her shoulder feels about the same - still mild soreness        TREATMENT   Precautions:    Pain: no pain at rest but 2/10 with activity today     X in shaded column indicates Activity Completed Today   Modalities Parameters/  Location  Notes/Comments                     Manual Therapy Time/  Technique  Notes/Comments   Supine PROM to L shoulder all motions to tolerance  X    Sidelying manual massage to L upper trap, levator scap, medial scap border Gentle scap mobs      Exercises   Sets/  Sec Reps  Notes/Comments   Sidelying sleeper stretch 20 sec 4 X    Standing dowel for IR up back and then extension away from body 3 sec hold 10 ea     Supine chest press with 2# 1 10 X    Supine scapular punch with 2# 1 10 X    Supine ceiling circles with 2# 1 15 ea. way     Supine alphabet with 2#   X    Sidelying ER 2# with towel roll under elbow 1 10 X    Pulleys for shoulder flexion and scaption  10 ea X Warm up   [place] theraband for rockwoods and rows 1 10 ea X Upgraded from [place] band today for all theraband exercises   Orange band bicep and tricep 1 set 10 ea X    Supine Orange band Riivalid exercises 1 sets 10 X    Supine shoulder extension from 90 with orange theraband  10 X    Standing wall slide left UE only with lift off at top 1 10 X    Supine flexion with 1# 1 5 X Attempted with 1# however after 5 reps pt. Reporting catching so stopped   Standing for arcs on wall to tolerance  10     biodex 70 speed 5 min. Backward   10 X           Activities Time    Notes/Comments   Trialed kinesiotape \"I\" strip anterior to posterior for mechanical correction   I strip still in place. Added a Y strip to deltoid for inhibition. Specific Interventions Next Treatment: PROM/AAROM/AROM as tolerated, STM as needed, gentle strengthening when able, modalities as needed    Activity/Treatment Tolerance:  [x]  Patient tolerated treatment well  []  Patient limited by fatigue  []  Patient limited by pain   []  Patient limited by other medical complications  []  Other:     Assessment: Continues to progress toward goals. Increased to orange theraband today from peach with good tolerance. Areas for Improvement: impaired ROM, impaired strength and pain  Prognosis: good    GOALS:  Patient Goal: To be able to complete self care without pain or difficulty and complete housework and work duties. Short Term Goals:  Time Frame: 4 weeks  1. Pt.  Will demo independence with HEP for left shoulder for improved ROM and eventual strength for return to daily routine. GOAL MET Patient reports she tries to complete stretches everyday. No questions regarding technique. CONTINUE GOAL   2. Pt. Will demo improved PROM left shoulder flexion= 150, abd=90, ER= 65 for ease with bathing and dressing. GOAL MET Demonstrates shoulder flexion 153, abduction 120, ER 65 degrees with report bathing and dressing activities are easier. REVISE GOAL Patient will demonstrate improved PROM left shoulder flexion= 160, abduction= 130, ER= 70 for ease with bathing and dressing. 3. Pt. Will report decreased pain in left shoulder to no greater than 4/10 with movement for ease with work tasks. GOAL NOT MET Patient reports any reaching away from the body increases pain, approximately 8-9/10 above shoulder level. CONTINUE GOAL     Long Term Goals:  Time Frame: 4 weeks  1. Pt. Will demo functional AROM left shoulder for independent return to all self care, housework, and work. GOAL MET Reports she is completing light housework and self care. REVISE GOAL Patient will demonstrate AROM left shoulder greater than 100 degrees flexion, 85 degrees abduction, 75 degrees ER with elbow at side to increase ease in reaching into overhead cupboards. 2. Pt. Will be able to lift a 5# bag of groceries with LUE to carry into house without difficulty. GOAL NOT MET Reports she continues to have difficulty and significant weakness with lifting activities. CONTINUE GOAL   3. Pt. Will be able to push up from a chair with BUE without pain in left shoulder. GOAL MET      Patient Education:   []  HEP/Education Completed: Plan of Care, Goals, encouraged pt.  To ice her shoulder in the evening after work if painful   Medbridge Access HEP: scap pinches, scap depression, supine dowel for ER, table slides; 3/4/21 added supine full arc shoulder flexion from side to overhead and sidelying ER to HEP; 3/11/21 added rockwoods with peach band to HEP; 3/25/21 gave orange band for HEP  []  No new Education completed  [x]  Reviewed theraband HEP - gave orange band to upgrade HEP  [x]  Patient verbalized and/or demonstrated understanding of education provided. []  Patient unable to verbalize and/or demonstrate understanding of education provided. Will continue education. [x]  Barriers to learning: none    PLAN:  Treatment Recommendations: Strengthening, Range of Motion, Manual Therapy - Soft Tissue Mobilization and Home Exercise Program    []  Plan of care initiated. Plan to see patient 2 times per week for 6 weeks to address the treatment planned outlined above. [x]  Continue with current plan of care  []  Modify plan of care as follows: Continue 2x a week for 4 additional weeks until March 30th.   []  Hold pending physician visit  []  Discharge    Time In 1625   Time Out 1705       Timed Code Minutes: Minutes Units   ADL (88 [id number] 60)     Aquatics (05525)     Manual Therapy (15384)     Neuro (65265)     Th. Activities (13822)     Th. Exercise (57997) 40 3   Wheelchair Mgmt (19424)     Cognitive Function (1st 15 min - 50283)     Cognitive Function (per sub.  15 min - 49656)     Ultrasound (85912)     Ionto (23672)     Manual E-Stim (39130)          TOTAL TREATMENT TIME: 40 minutes       Laura 70, OTR/L 4460

## 2021-03-30 ENCOUNTER — HOSPITAL ENCOUNTER (OUTPATIENT)
Dept: OCCUPATIONAL THERAPY | Age: 66
Setting detail: THERAPIES SERIES
Discharge: HOME | End: 2021-03-30
Payer: COMMERCIAL

## 2021-03-30 PROCEDURE — 97110 THERAPEUTIC EXERCISES: CPT

## 2021-03-30 NOTE — FLOWSHEET NOTE
[address] THERAPY  [] EVALUATION  [] DAILY NOTE (LAND) [] DAILY NOTE (AQUATIC )   [x] PROGRESS NOTE [] DISCHARGE NOTE    [x] OUTPATIENT REHABILITATION [facility]   [] [place] [address]    [] [facility]   [] Luisa Nur    Date: 3/30/2021  Patient Name:  Rosalina Mercado  :   MRN: [id number]  CSN: [id number]    Referring Practitioner Avel Johnson MD   Diagnosis Impingement syndrome of left shoulder [M75.42]  Strain of muscle(s) and tendon(s) of the rotator cuff of left shoulder, initial encounter [S46.012A]    Treatment Diagnosis Left shoulder scope 21   Date of Evaluation 21      Functional Outcome Measure Used UEFS   Functional Outcome Score 50 (21); 63/80 (3/2/21)      Insurance: Primary: Payor: Jodi Ferreira /  /  / ,   Secondary:    Authorization Information: No visit limit, modalities covered   Visit # 17;  PN completed 3/2/21; and 3/30/21   Visits Allowed:    Recertification Date: 2021   Physician Follow-Up: 2021   Physician Orders: Marnee Shoulder, PROM left shoulder/ Heat/US/Massage; Increase strength and ROM   Pertinent History: Pt. Had been having pain for 9 months in left shoulder after having to use crutches due to a hip issue. Pt. Had surgery 21 for a shoulder scope. Pt. States she did not have RC tear but some fraying that the doctor cleaned up. Pt. Not wearing a sling today and was told she could wean out of it.       SUBJECTIVE: Patient reports her shoulder pretty good        TREATMENT   Precautions:    Pain: no pain at rest but 2/10 with activity today     X in shaded column indicates Activity Completed Today   Modalities Parameters/  Location  Notes/Comments                     Manual Therapy Time/  Technique  Notes/Comments   Supine PROM to L shoulder all motions to tolerance  Xx Measurements taken for PN today   Sidelying manual massage to L upper trap, levator scap, medial scap going through the drive thru. Pt. Also has difficulty keeping her arm up long enough to style her hair. Pt. Would benefit from 4 more weeks of therapy to continue to improve AROM and strength/endurance of left shoulder. Pt. To see doctor for follow up on April 6, 2021. Areas for Improvement: impaired ROM, impaired strength and pain  Prognosis: good    GOALS:  Patient Goal: To be able to complete self care without pain or difficulty and complete housework and work duties. Short Term Goals:  Time Frame: 4 weeks  1. Pt. Will demo independence with HEP for left shoulder for improved ROM and eventual strength for return to daily routine. GOAL MET Patient reports she tries to complete stretches everyday. No questions regarding technique. CONTINUE GOAL -  GOAL MET 3/30/21 WITH PT. NOW COMPLETING ORANGE THERABAND EXERCISES IN ADDITION TO STRETCHES FOR HEP  Continue with upgrades as needed     2. Pt. Will demo improved PROM left shoulder flexion= 150, abd=90, ER= 65 for ease with bathing and dressing. GOAL MET Demonstrates shoulder flexion 153, abduction 120, ER 65 degrees with report bathing and dressing activities are easier. REVISE GOAL Patient will demonstrate improved PROM left shoulder flexion= 160, abduction= 130, ER= 70 for ease with bathing and dressing. GOAL MET 3/30/21 WITH PASSIVE LEFT SHOULDER FLEXION= 168, ABD= 170 AND ER= 75  Discontinue - goal met    3. Pt. Will report decreased pain in left shoulder to no greater than 4/10 with movement for ease with work tasks. GOAL NOT MET Patient reports any reaching away from the body increases pain, approximately 8-9/10 above shoulder level. CONTINUE GOAL - GOAL NOT MET 3/30/21 BUT IMPROVING - PT. REPORTS PAIN IS ABOUT 5/10 WITH REACHING ABOVE SHOULDER LEVEL -   Continue goal    Long Term Goals:  Time Frame: 4 weeks  1. Pt. Will demo functional AROM left shoulder for independent return to all self care, housework, and work.  GOAL MET Reports she is completing light Plan of care initiated. Plan to see patient 2 times per week for 6 weeks to address the treatment planned outlined above. []  Continue with current plan of care  [x]  Modify plan of care as follows: Continue 2x a week for 4 additional weeks through April 27, 2021   []  Hold pending physician visit  []  Discharge    Time In 1615   Time Out 1700       Timed Code Minutes: Minutes Units   ADL (93125)     Aquatics (13411)     Manual Therapy (80238)     Neuro (97376)     Th. Activities (40434)     Th. Exercise (01073) 45 3   Wheelchair Mgmt (85804)     Cognitive Function (1st 15 min - 41205)     Cognitive Function (per sub.  15 min - 39790)     Ultrasound (45653)     Ionto (44356)     Manual E-Stim (31920)          TOTAL TREATMENT TIME: 45 minutes       Laura 70, OTR/L 7531

## 2021-04-01 ENCOUNTER — HOSPITAL ENCOUNTER (OUTPATIENT)
Dept: OCCUPATIONAL THERAPY | Age: 66
Setting detail: THERAPIES SERIES
Discharge: HOME | End: 2021-04-01
Payer: COMMERCIAL

## 2021-04-01 DIAGNOSIS — F32.A DEPRESSION, UNSPECIFIED DEPRESSION TYPE: ICD-10-CM

## 2021-04-01 DIAGNOSIS — E55.9 VITAMIN D DEFICIENCY: ICD-10-CM

## 2021-04-01 PROCEDURE — 97110 THERAPEUTIC EXERCISES: CPT

## 2021-04-01 RX ORDER — ERGOCALCIFEROL 1.25 MG/1
50000 CAPSULE ORAL
Qty: 24 CAPSULE | Refills: 3 | Status: SHIPPED | OUTPATIENT
Start: 2021-04-01 | End: 2021-12-14

## 2021-04-01 RX ORDER — FLUOXETINE HYDROCHLORIDE 20 MG/1
CAPSULE ORAL
Qty: 60 CAPSULE | Refills: 11 | Status: SHIPPED | OUTPATIENT
Start: 2021-04-01 | End: 2022-03-29 | Stop reason: SDUPTHER

## 2021-04-01 NOTE — PROGRESS NOTES
[address] THER[address]Y  [] EVALUATION  [] DAILY NOTE (LAND) [] DAILY NOTE (AQUATIC )   [x] PROGRESS NOTE [] DISCHARGE NOTE    [x] OUTPATIENT REHABILITATION [facility]   [] [place] [address]    [] [facility]   [] Bob oGmez    Date: 2021  Patient Name:  Regine Dan  : [age]  MRN: [id number]  CSN: [id number]    Referring Practitioner Pascual Tellez MD   Diagnosis Impingement syndrome of left shoulder [M75.42]  Strain of muscle(s) and tendon(s) of the rotator cuff of left shoulder, initial encounter [S46.012A]    Treatment Diagnosis Left shoulder scope 21   Date of Evaluation 21      Functional Outcome Measure Used UEFS   Functional Outcome Score 50 (21); 63/80 (3/2/21)      Insurance: Primary: Payor: Artem Holt /  /  / ,   Secondary:    Authorization Information: No visit limit, modalities covered   Visit # 18, 1/10;  PN completed 3/2/21; and 3/30/21   Visits Allowed:    Recertification Date: 2021   Physician Follow-Up: 2021   Physician Orders: Liam Moreno, PROM left shoulder/ Heat/US/Massage; Increase strength and ROM   Pertinent History: Pt. Had been having pain for 9 months in left shoulder after having to use crutches due to a hip issue. Pt. Had surgery 21 for a shoulder scope. Pt. States she did not have RC tear but some fraying that the doctor cleaned up. Pt. Not wearing a sling today and was told she could wean out of it. SUBJECTIVE: Patient reports her shoulder has been \"thumping\" all day down the lateral side - thinks maybe it is due to the weather turning cold.  Had to take ibuprofen today due to the discomfort        TREATMENT   Precautions:    Pain: States lateral shoulder is \"thumping\" today     X in shaded column indicates Activity Completed Today   Modalities Parameters/  Location  Notes/Comments                     Manual Therapy Time/  Technique  Notes/Comments   Supine PROM to L shoulder all motions to tolerance  X    Sidelying manual massage to L upper trap, levator scap, medial scap border      Gentle scap mobs      Exercises   Sets/  Sec Reps  Notes/Comments   Sidelying sleeper stretch 30 sec 3 X    Sidelying active abduction no weight  10 X    Sidelying ER 2# with towel roll under elbow  15 X           Prone [place] no weight 1 set 10 each X    Supine chest press with scapular punch with 2# 1 15 X    Supine ceiling circles with 2# 1 15 ea. way X    Supine alphabet with 2#       Ball on wall LUE for +  10 X    Pulleys for shoulder flexion and scaption  10 ea X Warm up   [place] theraband for rockwoods and rows 1 10 ea X    Standing orange band for chest pull aparts  10 X Very left shoulder weak for this   Orange band bicep and tricep 1 set 10 ea     Supine Orange band Riivalid exercises 1 sets 10     Supine shoulder extension from 90 with orange theraband  10     Standing wall slide left UE only with lift off at top 1 10 X Weakness noted with lift off   Supine flexion with 1# full arc of motion 1 10 X    Standing for arcs on wall to tolerance  10     biodex 60 speed 3 min. Backward and 2 min. forward    X           Activities Time    Notes/Comments   Trialed kinesiotape \"I\" strip anterior to posterior for mechanical correction   I strip still in place. Added a Y strip to deltoid for inhibition. Specific Interventions Next Treatment: PROM/AAROM/AROM as tolerated, STM as needed, gentle strengthening when able, modalities as needed    Activity/Treatment Tolerance:  [x]  Patient tolerated treatment well  []  Patient limited by fatigue  []  Patient limited by pain   []  Patient limited by other medical complications  []  Other:     Assessment:   Progressing toward goals. Significant weakness for abduction.  Several new strengthening exercises today  Areas for Improvement: impaired ROM, impaired strength and pain  Prognosis: good    GOALS:  Patient Goal: To be able to flexion= 155, abd= 120, ER= 80 for ease with reaching out  side window to take change/take bag of food from drive thru etc.     2. Pt. Will be able to lift a 5# bag of groceries with LUE to carry into house without difficulty. GOAL NOT MET Reports she continues to have difficulty and significant weakness with lifting activities. CONTINUE GOAL - GOAL MET 3/30/21  Revised Goal: Pt. Will demo improved strength/endurance left shoulder to be able to keep LUE up long enough to style her hair without fatigue in shoulder     3. Pt. Will be able to push up from a chair with BUE without pain in left shoulder. GOAL MET      Patient Education:   []  HEP/Education Completed: Plan of Care, Goals, encouraged pt. To ice her shoulder in the evening after work if painful   Medbridge Access HEP: scap pinches, scap depression, supine dowel for ER, table slides; 3/4/21 added supine full arc shoulder flexion from side to overhead and sidelying ER to HEP; 3/11/21 added rockwoods with peach band to HEP; 3/25/21 gave orange band for HEP  [x]  No new Education completed  []  Reviewed today's ROM measurements with pt. And how they compare to previous assessment. Discussed goals and POC. []  Patient verbalized and/or demonstrated understanding of education provided. []  Patient unable to verbalize and/or demonstrate understanding of education provided. Will continue education. [x]  Barriers to learning: none    PLAN:  Treatment Recommendations: Strengthening, Range of Motion, Manual Therapy - Soft Tissue Mobilization and Home Exercise Program    []  Plan of care initiated. Plan to see patient 2 times per week for 6 weeks to address the treatment planned outlined above.   []  Continue with current plan of care  [x]  Modify plan of care as follows: Continue 2x a week for 4 additional weeks through April 27, 2021   []  Hold pending physician visit  []  Discharge    Time In 1530   Time Out 1610       Timed Code Minutes: Minutes Units

## 2021-04-01 NOTE — TELEPHONE ENCOUNTER
This medication refill is regarding a electronic request by [facility] [address] Requested Prescriptions     Pending Prescriptions Disp Refills    FLUoxetine (PROZAC) 20 MG capsule [Pharmacy Med Name: FLUOXETINE HCL 20MG CAPS] 60 capsule 11     Sig: TAKE 2 CAPSULES BY MOUTH ONCE DAILY.  vitamin D (ERGOCALCIFEROL) 1.25 MG (90993 UT) CAPS capsule [Pharmacy Med Name: VITAMIN D (ERGOCALCIFE 1.25 MG CAPS] 24 capsule 3     Sig: TAKE 1 CAPSULE BY MOUTH TWICE A WEEK. Date of last visit: 10/7/2020  Date of next visit: None  Date of last refill: Fluoxetine  3/10/2020  60/11  Vitamin D  4/13/2020  24/3    Rx verified, ordered and set to EP.

## 2021-04-07 ENCOUNTER — HOSPITAL ENCOUNTER (OUTPATIENT)
Dept: OCCUPATIONAL THERAPY | Age: 66
Setting detail: THERAPIES SERIES
Discharge: HOME | End: 2021-04-07
Payer: COMMERCIAL

## 2021-04-07 PROCEDURE — 97110 THERAPEUTIC EXERCISES: CPT

## 2021-04-07 NOTE — PROGRESS NOTES
motions to tolerance  X    Sidelying manual massage to L upper trap, levator scap, medial scap border      Gentle scap mobs      Exercises   Sets/  Sec Reps  Notes/Comments   Sidelying sleeper stretch 30 sec 3 X    Wall slide  10 sec 10 X    Pec stretch on vertical towel roll 1 min  X    Sidelying active abduction to 90 degrees 2 10 X No weight   Sidelying ER 2# with towel roll under elbow  15     Prone [place] no weight 1 set 10 each X    Supine scapular punch with 2# 2 10 X    Supine ceiling circles with 2# 1 15 ea. way X    Supine alphabet with 2#   X    Ball on wall LUE for +  10 X    Pulleys for shoulder flexion and scaption  10 ea X Warm up   [place] theraband for rockwoods and rows 4 10 ea X Needs close supervision and cues to avoid compensation. Standing orange band for chest pull aparts  10  Very left shoulder weak for this   Orange band bicep and tricep 1 set 10 ea     Supine Riivalid 3 15 X Orange band   Supine shoulder extension from 90 with orange theraband  10     Standing wall slide left UE only with lift off at top 1 10  Weakness noted with lift off   Supine flexion with 1# full arc of motion 1 10     Standing for arcs on wall to tolerance  10     Biodex 60 speed 4 min backward    X           Activities Time    Notes/Comments   Trialed kinesiotape \"I\" strip anterior to posterior for mechanical correction   I strip still in place. Added a Y strip to deltoid for inhibition. Specific Interventions Next Treatment: PROM/AAROM/AROM as tolerated, STM as needed, gentle strengthening when able, modalities as needed    Activity/Treatment Tolerance:  [x]  Patient tolerated treatment well  []  Patient limited by fatigue  []  Patient limited by pain   []  Patient limited by other medical complications  []  Other:     Assessment:   Progressing toward goals. Weakness continues, education needed to avoid compensation.   Areas for Improvement: impaired ROM, impaired strength and pain  Prognosis: Will demo improved AROM left shoulder to flexion= 155, abd= 120, ER= 80 for ease with reaching out  side window to take change/take bag of food from drive thru etc.     2. Pt. Will be able to lift a 5# bag of groceries with LUE to carry into house without difficulty. GOAL NOT MET Reports she continues to have difficulty and significant weakness with lifting activities. CONTINUE GOAL - GOAL MET 3/30/21  Revised Goal: Pt. Will demo improved strength/endurance left shoulder to be able to keep LUE up long enough to style her hair without fatigue in shoulder     3. Pt. Will be able to push up from a chair with BUE without pain in left shoulder. GOAL MET      Patient Education:   []  HEP/Education Completed: Technique for HEP.  Medbridge Access HEP: scap pinches, scap depression, supine dowel for ER, table slides; 3/4/21 added supine full arc shoulder flexion from side to overhead and sidelying ER to HEP; 3/11/21 added rockwoods with peach band to HEP; 3/25/21 gave orange band for HEP  []  No new Education completed  []  Reviewed today's ROM measurements with pt. And how they compare to previous assessment. Discussed goals and POC. [x]  Patient verbalized and/or demonstrated understanding of education provided. []  Patient unable to verbalize and/or demonstrate understanding of education provided. Will continue education. [x]  Barriers to learning: none    PLAN:  Treatment Recommendations: Strengthening, Range of Motion, Manual Therapy - Soft Tissue Mobilization and Home Exercise Program    []  Plan of care initiated. Plan to see patient 2 times per week for 6 weeks to address the treatment planned outlined above.   [x]  Continue with current plan of care  []  Modify plan of care as follows: Continue 2x a week for 4 additional weeks through April 27, 2021   []  Hold pending physician visit  []  Discharge    Time In 1545   Time Out 1630       Timed Code Minutes: Minutes Units   ADL (88 [id number] 24 60)     Aquatics (26985) Manual Therapy (08165)     Neuro (54819)     Th. Activities (15624)     Th. Exercise (45589) 45 3   Wheelchair Mgmt (17305)     Cognitive Function (1st 15 min - 71119)     Cognitive Function (per sub.  15 min - 01834)     Ultrasound (93856)     Ionto (49751)     Manual E-Stim (70397)          TOTAL TREATMENT TIME: 39 minutes       KARINA VILCHIS/L #[id number]

## 2021-04-09 ENCOUNTER — HOSPITAL ENCOUNTER (OUTPATIENT)
Dept: OCCUPATIONAL THERAPY | Age: 66
Setting detail: THERAPIES SERIES
Discharge: HOME | End: 2021-04-09
Payer: COMMERCIAL

## 2021-04-09 PROCEDURE — 97110 THERAPEUTIC EXERCISES: CPT

## 2021-04-09 NOTE — PROGRESS NOTES
[address] THERAPY  [] EVALUATION  [x] DAILY NOTE (LAND) [] DAILY NOTE (AQUATIC )   [] PROGRESS NOTE [] DISCHARGE NOTE    [x] OUTPATIENT REHABILITATION [facility]   [] [place] [address]    [] [facility]   [] Chao Nogueira    Date: 2021  Patient Name:  Juanita Rice  : [birth year]  MRN: [id number]  CSN: [id number]    Referring Practitioner Loraine Sánchez MD   Diagnosis Impingement syndrome of left shoulder [M75.42]  Strain of muscle(s) and tendon(s) of the rotator cuff of left shoulder, initial encounter [S46.012A]    Treatment Diagnosis Left shoulder scope 21   Date of Evaluation 21      Functional Outcome Measure Used UEFS   Functional Outcome Score 50 (21); 63/80 (3/2/21)      Insurance: Primary: Payor: Jordana Wright /  /  / ,   Secondary:    Authorization Information: No visit limit, modalities covered   Visit # 20, 3/10;  PN completed 3/2/21; and 3/30/21   Visits Allowed: None   Recertification Date: 2021   Physician Follow-Up: May 18th, 2021   Physician Orders: Darcella Draft, PROM left shoulder/ Heat/US/Massage; Increase strength and ROM   Pertinent History: Pt. Had been having pain for 9 months in left shoulder after having to use crutches due to a hip issue. Pt. Had surgery 21 for a shoulder scope. Pt. States she did not have RC tear but some fraying that the doctor cleaned up. Pt. Not wearing a sling today and was told she could wean out of it.       SUBJECTIVE: Patient reports a little soreness in left shoulder today      TREATMENT   Precautions:    Pain: \"A little sore\" left shoulder     X in shaded column indicates Activity Completed Today   Modalities Parameters/  Location  Notes/Comments                     Manual Therapy Time/  Technique  Notes/Comments   Supine PROM to L shoulder all motions to tolerance; manual pec minor stretch  Xx    Sidelying manual massage to L upper trap, levator scap, medial scap border      Gentle scap mobs      Exercises   Sets/  Sec Reps  Notes/Comments   Sidelying sleeper stretch 30 sec 3 Xx    Wall slide  10 sec 10     Pec stretch on vertical towel roll 1 min      Sidelying active abduction to tolerance 2 10 Xx No weight   Sidelying ER 2# with towel roll under elbow  15 Xx    Prone [place] no weight (however 2# for rows) 1 set 10 each Xx    Supine scapular punch with 2# 1 set 15 Xx    Supine ceiling circles with 2# 1 15 ea. way Xx    Supine alphabet with 2#       Ball on wall LUE for +  10     Pulleys for shoulder flexion and scaption  10 ea Xx Warm up   Orange theraband for [place]  4 directions 10 ea Xx Pt. demo'd good awareness of avoiding upper trap compensation     10     Orange band bicep and tricep 1 set 10 ea     Supine Riivalid 1 set of 3 directions 15 ea. direction Xx Orange band   Supine shoulder extension from 90 with orange theraband  10 Xx    Standing wall slide left UE only with lift off at top 1 10     Supine flexion full arc of motion no weigth 1 10 Xx initially having pinching from 90 degrees down to mat - had pt. Complete SA punch from 90 degrees down to mat table and this prevented the pinching   Standing for arcs on wall to tolerance  10     Biodex 60 speed 4 min backward    Xx           Activities Time    Notes/Comments   Trialed kinesiotape \"I\" strip anterior to posterior for mechanical correction   I strip still in place. Added a Y strip to deltoid for inhibition. Specific Interventions Next Treatment: PROM/AAROM/AROM as tolerated, STM as needed, gentle strengthening when able, modalities as needed    Activity/Treatment Tolerance:  [x]  Patient tolerated treatment well  []  Patient limited by fatigue  []  Patient limited by pain   []  Patient limited by other medical complications  []  Other:     Assessment:   Progressing toward goals.  Focus on RC strengthening  Areas for Improvement: impaired ROM, impaired strength and pain  Prognosis: good    GOALS:  Patient Goal: To be able to complete self care without pain or difficulty and complete housework and work duties. Short Term Goals:  Time Frame: 4 weeks  1. Pt. Will demo independence with HEP for left shoulder for improved ROM and eventual strength for return to daily routine. GOAL MET Patient reports she tries to complete stretches everyday. No questions regarding technique. CONTINUE GOAL -  GOAL MET 3/30/21 WITH PT. NOW COMPLETING ORANGE THERABAND EXERCISES IN ADDITION TO STRETCHES FOR HEP  Continue with upgrades as needed     2. Pt. Will demo improved PROM left shoulder flexion= 150, abd=90, ER= 65 for ease with bathing and dressing. GOAL MET Demonstrates shoulder flexion 153, abduction 120, ER 65 degrees with report bathing and dressing activities are easier. REVISE GOAL Patient will demonstrate improved PROM left shoulder flexion= 160, abduction= 130, ER= 70 for ease with bathing and dressing. GOAL MET 3/30/21 WITH PASSIVE LEFT SHOULDER FLEXION= 168, ABD= 170 AND ER= 75  Discontinue - goal met    3. Pt. Will report decreased pain in left shoulder to no greater than 4/10 with movement for ease with work tasks. GOAL NOT MET Patient reports any reaching away from the body increases pain, approximately 8-9/10 above shoulder level. CONTINUE GOAL - GOAL NOT MET 3/30/21 BUT IMPROVING - PT. REPORTS PAIN IS ABOUT 5/10 WITH REACHING ABOVE SHOULDER LEVEL -   Continue goal    Long Term Goals:  Time Frame: 4 weeks  1. Pt. Will demo functional AROM left shoulder for independent return to all self care, housework, and work. GOAL MET Reports she is completing light housework and self care. REVISE GOAL Patient will demonstrate AROM left shoulder greater than 100 degrees flexion, 85 degrees abduction, 75 degrees ER with elbow at side to increase ease in reaching into overhead cupboards.  - GOAL MET 3/30/21 WITH ACTIVE LEFT SHOULDER FLEXION= 135, ABD= 90 AND ER= 75, IR THUMB TIP TO BRA LINE  Revised Goal: Pt. Will demo improved AROM left shoulder to flexion= 155, abd= 120, ER= 80 for ease with reaching out  side window to take change/take bag of food from drive thru etc.     2. Pt. Will be able to lift a 5# bag of groceries with LUE to carry into house without difficulty. GOAL NOT MET Reports she continues to have difficulty and significant weakness with lifting activities. CONTINUE GOAL - GOAL MET 3/30/21  Revised Goal: Pt. Will demo improved strength/endurance left shoulder to be able to keep LUE up long enough to style her hair without fatigue in shoulder     3. Pt. Will be able to push up from a chair with BUE without pain in left shoulder. GOAL MET      Patient Education:   []  HEP/Education Completed: Technique for HEP.  Medbridge Access HEP: scap pinches, scap depression, supine dowel for ER, table slides; 3/4/21 added supine full arc shoulder flexion from side to overhead and sidelying ER to HEP; 3/11/21 added rockwoods with peach band to HEP; 3/25/21 gave orange band for HEP  [x]  No new Education completed  []  Reviewed today's ROM measurements with pt. And how they compare to previous assessment. Discussed goals and POC. []  Patient verbalized and/or demonstrated understanding of education provided. []  Patient unable to verbalize and/or demonstrate understanding of education provided. Will continue education. [x]  Barriers to learning: none    PLAN:  Treatment Recommendations: Strengthening, Range of Motion, Manual Therapy - Soft Tissue Mobilization and Home Exercise Program    []  Plan of care initiated. Plan to see patient 2 times per week for 6 weeks to address the treatment planned outlined above.   [x]  Continue with current plan of care through April 27  []  Modify plan of care as follows: Continue 2x a week for 4 additional weeks through April 27, 2021   []  Hold pending physician visit  []  Discharge    Time In 1530   Time Out 1615       Timed Code Minutes: Minutes Units   ADL ([id number])

## 2021-04-13 ENCOUNTER — HOSPITAL ENCOUNTER (OUTPATIENT)
Dept: OCCUPATIONAL THERAPY | Age: 66
Setting detail: THERAPIES SERIES
Discharge: HOME | End: 2021-04-13
Payer: COMMERCIAL

## 2021-04-13 PROCEDURE — 97110 THERAPEUTIC EXERCISES: CPT

## 2021-04-13 NOTE — PROGRESS NOTES
[address] THERAPY  [] EVALUATION  [x] DAILY NOTE (LAND) [] DAILY NOTE (AQUATIC )   [] PROGRESS NOTE [] DISCHARGE NOTE    [x] OUTPATIENT REHABILITATION [facility]   [] [place] [address]    [] [facility]   [] Debbie Fragaer    Date: 2021  Patient Name:  Steven Miranda  :   MRN: [id number]  CSN: [id number]    Referring Practitioner Sun Montiel MD   Diagnosis Impingement syndrome of left shoulder [M75.42]  Strain of muscle(s) and tendon(s) of the rotator cuff of left shoulder, initial encounter [S46.012A]    Treatment Diagnosis Left shoulder scope 21   Date of Evaluation 21      Functional Outcome Measure Used UEFS   Functional Outcome Score 50 (21); 63/80 (3/2/21)      Insurance: Primary: Payor: Anahi Zabala /  /  / ,   Secondary:    Authorization Information: No visit limit, modalities covered   Visit # 21, 4/10;  PN completed 3/2/21; and 3/30/21   Visits Allowed: None   Recertification Date: 2021   Physician Follow-Up: May 18th, 2021   Physician Orders: Fernando Chow, PROM left shoulder/ Heat/US/Massage; Increase strength and ROM   Pertinent History: Pt. Had been having pain for 9 months in left shoulder after having to use crutches due to a hip issue. Pt. Had surgery 21 for a shoulder scope. Pt. States she did not have RC tear but some fraying that the doctor cleaned up. Pt. Not wearing a sling today and was told she could wean out of it. SUBJECTIVE: Patient reports no pain today in shoulder.  States had a massage last night and it felt very good on left shoulder      TREATMENT   Precautions:    Pain: No pain left shoulder     X in shaded column indicates Activity Completed Today   Modalities Parameters/  Location  Notes/Comments                     Manual Therapy Time/  Technique  Notes/Comments   Supine PROM to L shoulder all motions to tolerance  X    Sidelying manual massage to L upper trap, levator scap, medial scap border      Gentle scap mobs      Exercises   Sets/  Sec Reps  Notes/Comments   Sidelying sleeper stretch 30 sec 3     Wall slide  10 sec 10     Pec stretch on vertical towel roll 1 min      Sidelying active abduction to tolerance with 1# 1 set 10 X Increased abduction noted today in sidelying   Sidelying ER 2# with towel roll under elbow  15 X    Prone Houghstons with 1# (however 3# for rows) 1 set 10 each X Pt. Tolerated increased weight today   Supine chest press with scapular punch with 2# 1 set 15 X    Supine ceiling circles with 2# 1 15 ea. way X    Supine alphabet with 2#       Ball on wall LUE for +  10     Pulleys for shoulder flexion and scaption  10 ea     [place] theraband for rock[place]  4 directions 10 ea X Pt. demo'd good awareness of avoiding upper trap compensation     10     Orange band bicep and tricep 1 set 10 ea     Supine Riivalid 1 set of 3 directions 15 ea. direction X [place] band   Supine shoulder extension from 90 with orange theraband  15 X    Standing wall slide left UE only with lift off at top 1 10     Supine flexion full arc of motion with 1# 1 10 X had pt. Complete SA punch from 90 degrees down to mat table and this prevented the pinching   Standing for arcs on wall to tolerance  10     Biodex 60 speed 4 min backward    X           Activities Time    Notes/Comments   Trialed kinesiotape \"I\" strip anterior to posterior for mechanical correction   I strip still in place. Added a Y strip to deltoid for inhibition. Specific Interventions Next Treatment: PROM/AAROM/AROM as tolerated, STM as needed, gentle strengthening when able, modalities as needed    Activity/Treatment Tolerance:  [x]  Patient tolerated treatment well  []  Patient limited by fatigue  []  Patient limited by pain   []  Patient limited by other medical complications  []  Other:     Assessment:   Progressing toward goals. Focus on RC strengthening - pt.  Tolerated 15 reps of all exercises plus added weights today with good tolerance. Will upgrade to green theraband at next visit  Areas for Improvement: impaired ROM, impaired strength and pain  Prognosis: good    GOALS:  Patient Goal: To be able to complete self care without pain or difficulty and complete housework and work duties. Short Term Goals:  Time Frame: 4 weeks  1. Pt. Will demo independence with HEP for left shoulder for improved ROM and eventual strength for return to daily routine. GOAL MET Patient reports she tries to complete stretches everyday. No questions regarding technique. CONTINUE GOAL -  GOAL MET 3/30/21 WITH PT. NOW COMPLETING ORANGE THERABAND EXERCISES IN ADDITION TO STRETCHES FOR HEP  Continue with upgrades as needed     2. Pt. Will demo improved PROM left shoulder flexion= 150, abd=90, ER= 65 for ease with bathing and dressing. GOAL MET Demonstrates shoulder flexion 153, abduction 120, ER 65 degrees with report bathing and dressing activities are easier. REVISE GOAL Patient will demonstrate improved PROM left shoulder flexion= 160, abduction= 130, ER= 70 for ease with bathing and dressing. GOAL MET 3/30/21 WITH PASSIVE LEFT SHOULDER FLEXION= 168, ABD= 170 AND ER= 75  Discontinue - goal met    3. Pt. Will report decreased pain in left shoulder to no greater than 4/10 with movement for ease with work tasks. GOAL NOT MET Patient reports any reaching away from the body increases pain, approximately 8-9/10 above shoulder level. CONTINUE GOAL - GOAL NOT MET 3/30/21 BUT IMPROVING - PT. REPORTS PAIN IS ABOUT 5/10 WITH REACHING ABOVE SHOULDER LEVEL -   Continue goal    Long Term Goals:  Time Frame: 4 weeks  1. Pt. Will demo functional AROM left shoulder for independent return to all self care, housework, and work. GOAL MET Reports she is completing light housework and self care.  REVISE GOAL Patient will demonstrate AROM left shoulder greater than 100 degrees flexion, 85 degrees abduction, 75 degrees ER with elbow at side to increase ease in reaching into overhead cupboards. - GOAL MET 3/30/21 WITH ACTIVE LEFT SHOULDER FLEXION= 135, ABD= 90 AND ER= 75, IR THUMB TIP TO BRA LINE  Revised Goal: Pt. Will demo improved AROM left shoulder to flexion= 155, abd= 120, ER= 80 for ease with reaching out  side window to take change/take bag of food from drive thru etc.     2. Pt. Will be able to lift a 5# bag of groceries with LUE to carry into house without difficulty. GOAL NOT MET Reports she continues to have difficulty and significant weakness with lifting activities. CONTINUE GOAL - GOAL MET 3/30/21  Revised Goal: Pt. Will demo improved strength/endurance left shoulder to be able to keep LUE up long enough to style her hair without fatigue in shoulder     3. Pt. Will be able to push up from a chair with BUE without pain in left shoulder. GOAL MET      Patient Education:   []  HEP/Education Completed: Technique for HEP.  Medbridge Access HEP: scap pinches, scap depression, supine dowel for ER, table slides; 3/4/21 added supine full arc shoulder flexion from side to overhead and sidelying ER to HEP; 3/11/21 added rockwoods with peach band to HEP; 3/25/21 gave orange band for HEP  [x]  No new Education completed  []  Reviewed today's ROM measurements with pt. And how they compare to previous assessment. Discussed goals and POC. []  Patient verbalized and/or demonstrated understanding of education provided. []  Patient unable to verbalize and/or demonstrate understanding of education provided. Will continue education. [x]  Barriers to learning: none    PLAN:  Treatment Recommendations: Strengthening, Range of Motion, Manual Therapy - Soft Tissue Mobilization and Home Exercise Program    []  Plan of care initiated. Plan to see patient 2 times per week for 6 weeks to address the treatment planned outlined above.   [x]  Continue with current plan of care through April 27  []  Modify plan of care as follows: Continue 2x a week for 4 additional weeks through April 27, 2021   []  Hold pending physician visit  []  Discharge    Time In 1615   Time Out 1700       Timed Code Minutes: Minutes Units   ADL (35616)     Aquatics (76115)     Manual Therapy (71454)     Neuro (72169)     Th. Activities (81762)     Th. Exercise (44297) 45 3   Wheelchair Mgmt (05240)     Cognitive Function (1st 15 min - 42437)     Cognitive Function (per sub.  15 min - 63036)     Ultrasound (06961)     Ionto (14998)     Manual E-Stim (68366)          TOTAL TREATMENT TIME: 45 minutes       Laura 70, OTR/L 4439

## 2021-04-16 ENCOUNTER — HOSPITAL ENCOUNTER (OUTPATIENT)
Dept: OCCUPATIONAL THERAPY | Age: 66
Setting detail: THERAPIES SERIES
Discharge: HOME | End: 2021-04-16
Payer: COMMERCIAL

## 2021-04-16 PROCEDURE — 97110 THERAPEUTIC EXERCISES: CPT

## 2021-04-16 NOTE — PROGRESS NOTES
[address] THERAP[address]  [] EVALUATION  [x] DAILY NOTE (LAND) [] DAILY NOTE (AQUATIC )   [] PROGRESS NOTE [] DISCHARGE NOTE    [x] OUTPATIENT REHABILITATION [facility]   [] [address]    [] [facility]   [] Chao Nogueira    Date: 2021  Patient Name:  Juanita Rice  : [birth year]40  MRN: [id number]  CSN: [id number]    Referring Practitioner Loraine Sánchez MD   Diagnosis Impingement syndrome of left shoulder [M75.42]  Strain of muscle(s) and tendon(s) of the rotator cuff of left shoulder, initial encounter [S46.012A]    Treatment Diagnosis Left shoulder scope 21   Date of Evaluation 21      Functional Outcome Measure Used UEFS   Functional Outcome Score 50 (21); 63/80 (3/2/21)      Insurance: Primary: Payor: Jordana Wright /  /  / ,   Secondary:    Authorization Information: No visit limit, modalities covered   Visit # 22, 5/10;  PN completed 3/2/21; and 3/30/21   Visits Allowed: None   Recertification Date: 2021   Physician Follow-Up: May 18th, 2021   Physician Orders: Darcella Draft, PROM left shoulder/ Heat/US/Massage; Increase strength and ROM   Pertinent History: Pt. Had been having pain for 9 months in left shoulder after having to use crutches due to a hip issue. Pt. Had surgery 21 for a shoulder scope. Pt. States she did not have RC tear but some fraying that the doctor cleaned up. Pt. Not wearing a sling today and was told she could wean out of it.       SUBJECTIVE: Patient state that since the weather has gotten cooler, her shoulder has become a little stiff and achy today      TREATMENT   Precautions:    Pain: Mild ache/stiffness left shoulder     X in shaded column indicates Activity Completed Today   Modalities Parameters/  Location  Notes/Comments                     Manual Therapy Time/  Technique  Notes/Comments   Supine PROM to L shoulder all motions to tolerance  Xx    Sidelying manual massage to L upper trap, levator scap, medial scap border      Gentle scap mobs      Exercises   Sets/  Sec Reps  Notes/Comments   Sidelying sleeper stretch 30 sec 3 Xx    Wall slide  10 sec 10     Pec stretch on vertical towel roll 1 min      Sidelying active abduction to tolerance with 1# 1 set 10 Xx    Sidelying ER 2# with towel roll under elbow  15 Xx    Prone [place] with 1# (however 3# for rows) 1 set 10 each X Pt. Tolerated increased weight today   Supine chest press with scapular punch with 2# 1 set 15 Xx    Supine ceiling circles with 2# 1 15 ea. way Xx    Supine alphabet with 2#       Ball on wall LUE for +  10     Pulleys for shoulder flexion and scaption  10 ea     Green theraband for [place]  4 directions 10 ea Xx      10     Orange band bicep and tricep 1 set 10 ea     Supine Riival[place] 1 set of 3 directions 15 ea. direction Xx Orange band   Supine shoulder extension from 90 with orange theraband  15 Xx    Standing wall slide left UE only with lift off at top 1 10     Supine flexion full arc of motion with 1# 1 10 Xx had pt. Complete SA punch from 90 degrees down to mat table and this prevented the pinching   Standing for arcs on wall to tolerance  10     Biodex 60 speed 4 min backward    Xx           Activities Time    Notes/Comments   Trialed kinesiotape \"I\" strip anterior to posterior for mechanical correction   I strip still in place. Added a Y strip to deltoid for inhibition. Specific Interventions Next Treatment: PROM/AAROM/AROM as tolerated, STM as needed, gentle strengthening when able, modalities as needed    Activity/Treatment Tolerance:  [x]  Patient tolerated treatment well  []  Patient limited by fatigue  []  Patient limited by pain   []  Patient limited by other medical complications  []  Other:     Assessment:   Progressing toward goals.  Focus on RC strengthening   Areas for Improvement: impaired ROM, impaired strength and pain  Prognosis: good    GOALS:  Patient Goal: To be able to complete self care without pain or difficulty and complete housework and work duties. Short Term Goals:  Time Frame: 4 weeks  1. Pt. Will demo independence with HEP for left shoulder for improved ROM and eventual strength for return to daily routine. GOAL MET Patient reports she tries to complete stretches everyday. No questions regarding technique. CONTINUE GOAL -  GOAL MET 3/30/21 WITH PT. NOW COMPLETING ORANGE THERABAND EXERCISES IN ADDITION TO STRETCHES FOR HEP  Continue with upgrades as needed     2. Pt. Will demo improved PROM left shoulder flexion= 150, abd=90, ER= 65 for ease with bathing and dressing. GOAL MET Demonstrates shoulder flexion 153, abduction 120, ER 65 degrees with report bathing and dressing activities are easier. REVISE GOAL Patient will demonstrate improved PROM left shoulder flexion= 160, abduction= 130, ER= 70 for ease with bathing and dressing. GOAL MET 3/30/21 WITH PASSIVE LEFT SHOULDER FLEXION= 168, ABD= 170 AND ER= 75  Discontinue - goal met    3. Pt. Will report decreased pain in left shoulder to no greater than 4/10 with movement for ease with work tasks. GOAL NOT MET Patient reports any reaching away from the body increases pain, approximately 8-9/10 above shoulder level. CONTINUE GOAL - GOAL NOT MET 3/30/21 BUT IMPROVING - PT. REPORTS PAIN IS ABOUT 5/10 WITH REACHING ABOVE SHOULDER LEVEL -   Continue goal    Long Term Goals:  Time Frame: 4 weeks  1. Pt. Will demo functional AROM left shoulder for independent return to all self care, housework, and work. GOAL MET Reports she is completing light housework and self care. REVISE GOAL Patient will demonstrate AROM left shoulder greater than 100 degrees flexion, 85 degrees abduction, 75 degrees ER with elbow at side to increase ease in reaching into overhead cupboards. - GOAL MET 3/30/21 WITH ACTIVE LEFT SHOULDER FLEXION= 135, ABD= 90 AND ER= 75, IR THUMB TIP TO BRA LINE  Revised Goal: Pt.  Will demo improved AROM left shoulder to flexion= 155, abd= 120, ER= 80 for ease with reaching out  side window to take change/take bag of food from drive thru etc.     2. Pt. Will be able to lift a 5# bag of groceries with LUE to carry into house without difficulty. GOAL NOT MET Reports she continues to have difficulty and significant weakness with lifting activities. CONTINUE GOAL - GOAL MET 3/30/21  Revised Goal: Pt. Will demo improved strength/endurance left shoulder to be able to keep LUE up long enough to style her hair without fatigue in shoulder     3. Pt. Will be able to push up from a chair with BUE without pain in left shoulder. GOAL MET      Patient Education:   []  HEP/Education Completed: Technique for HEP.  Medbridge Access HEP: scap pinches, scap depression, supine dowel for ER, table slides; 3/4/21 added supine full arc shoulder flexion from side to overhead and sidelying ER to HEP; 3/11/21 added rockwoods with peach band to HEP; 3/25/21 gave orange band for HEP  [x]  No new Education completed  []  Reviewed today's ROM measurements with pt. And how they compare to previous assessment. Discussed goals and POC. []  Patient verbalized and/or demonstrated understanding of education provided. []  Patient unable to verbalize and/or demonstrate understanding of education provided. Will continue education. [x]  Barriers to learning: none    PLAN:  Treatment Recommendations: Strengthening, Range of Motion, Manual Therapy - Soft Tissue Mobilization and Home Exercise Program    []  Plan of care initiated. Plan to see patient 2 times per week for 6 weeks to address the treatment planned outlined above.   [x]  Continue with current plan of care through April 27  []  Modify plan of care as follows: Continue 2x a week for 4 additional weeks through April 27, 2021   []  Hold pending physician visit  []  Discharge    Time In 1615   Time Out 1700       Timed Code Minutes: Minutes Units   ADL (63123)     Aquatics (47712)     Manual Therapy (99273)     Neuro (74290)     Th. Activities (50725)     Th. Exercise (65803) 45 3   Wheelchair Mgmt (27806)     Cognitive Function (1st 15 min - 50449)     Cognitive Function (per sub.  15 min - 50272)     Ultrasound (35088)     Ionto (97819)     Manual E-Stim (36881)          TOTAL TREATMENT TIME: 45 minutes       [place] [address], OTR/L 4460

## 2021-04-20 ENCOUNTER — HOSPITAL ENCOUNTER (OUTPATIENT)
Dept: OCCUPATIONAL THERAPY | Age: 66
Setting detail: THERAPIES SERIES
Discharge: HOME | End: 2021-04-20
Payer: COMMERCIAL

## 2021-04-20 PROCEDURE — 97110 THERAPEUTIC EXERCISES: CPT

## 2021-04-20 NOTE — PROGRESS NOTES
[address] THERAPY  [] EVALUATION  [x] DAILY NOTE (LAND) [] DAILY NOTE (AQUATIC )   [] PROGRESS NOTE [] DISCHARGE NOTE    [x] OUTPATIENT REHABILITATION [facility]   [] [place] [address]    [] [facility]   [] Aaron Salomon    Date: 2021  Patient Name:  Annalee Antony  :   MRN: [id number]  CSN: [id number]    Referring Practitioner Tamica Castillo MD   Diagnosis Impingement syndrome of left shoulder [M75.42]  Strain of muscle(s) and tendon(s) of the rotator cuff of left shoulder, initial encounter [S46.012A]    Treatment Diagnosis Left shoulder scope 21   Date of Evaluation 21      Functional Outcome Measure Used UEFS   Functional Outcome Score 50 (21); 63/80 (3/2/21)      Insurance: Primary: Payor: Joanna Gallegos /  /  / ,   Secondary:    Authorization Information: No visit limit, modalities covered   Visit # 23, 6/10;  PN completed 3/2/21; and 3/30/21   Visits Allowed: None   Recertification Date: 2021   Physician Follow-Up: May 18th, 2021   Physician Orders: Elvis Bentono, PROM left shoulder/ Heat/US/Massage; Increase strength and ROM   Pertinent History: Pt. Had been having pain for 9 months in left shoulder after having to use crutches due to a hip issue. Pt. Had surgery 21 for a shoulder scope. Pt. States she did not have RC tear but some fraying that the doctor cleaned up. Pt. Not wearing a sling today and was told she could wean out of it. SUBJECTIVE: Patient reports her shoulder is aching, possibly due to colder weather.        TREATMENT   Precautions:    Pain: Mild ache/stiffness left shoulder     X in shaded column indicates Activity Completed Today   Modalities Parameters/  Location  Notes/Comments                     Manual Therapy Time/  Technique  Notes/Comments   Supine PROM to L shoulder all motions to tolerance  x Good motion   Sidelying manual massage to L upper trap, levator scap, medial scap border      Gentle scap mobs      Exercises   Sets/  Sec Reps  Notes/Comments   Sidelying sleeper stretch 30 sec 3 x    Wall slide  10 sec 10     Pec stretch on vertical towel roll 1 min      Sidelying abduction to 90 degrees 1  10 x 1#   Sidelying ER with elbow at side on towel roll 1 15 x 2#   Prone ITY 1  10 ea. x 1#   Prone row 1 10 x 3#   Supine scapular punch 1  15 x 2#   Supine ceiling circles  1 15 ea. x 2#   Supine alphabet with 2#       Ball on wall LUE for +  10     Pulleys for shoulder flexion and scaption  10 ea x Warm up   [place] 1 10 ea x Green band - Modified ER and flexion to decrease upper trap compensation. Bicep curl 1  10  x Green band    Tricep extension 1 10 x Green band    Standing horizontal abduction 1 10  x Orange band - In mirror to avoid L shoulder hike. Supine shoulder extension from 90 with orange theraband  15     Standing wall slide left UE only with lift off at top 1 10     Supine flexion full arc of motion with 1# 1 10  had pt. Complete SA punch from 90 degrees down to mat table and this prevented the pinching   Standing for arcs on wall to tolerance  10     Biodex 60 speed 2 min forward 4 min backward    x Warm up           Activities Time    Notes/Comments   Trialed kinesiotape \"I\" strip anterior to posterior for mechanical correction   I strip still in place. Added a Y strip to deltoid for inhibition. Specific Interventions Next Treatment: PROM/AAROM/AROM as tolerated, STM as needed, gentle strengthening when able, modalities as needed    Activity/Treatment Tolerance:  [x]  Patient tolerated treatment well  []  Patient limited by fatigue  []  Patient limited by pain   []  Patient limited by other medical complications  []  Other:     Assessment: Progressing toward goals. Significant scapular weakness.    Areas for Improvement: impaired ROM, impaired strength and pain  Prognosis: good    GOALS:  Patient Goal: To be able to complete self care without for ease with reaching out  side window to take change/take bag of food from drive thru etc.     2. Pt. Will be able to lift a 5# bag of groceries with LUE to carry into house without difficulty. GOAL NOT MET Reports she continues to have difficulty and significant weakness with lifting activities. CONTINUE GOAL - GOAL MET 3/30/21  Revised Goal: Pt. Will demo improved strength/endurance left shoulder to be able to keep LUE up long enough to style her hair without fatigue in shoulder     3. Pt. Will be able to push up from a chair with BUE without pain in left shoulder. GOAL MET      Patient Education:   []  HEP/Education Completed: Technique for HEP.  Medbridge Access HEP: scap pinches, scap depression, supine dowel for ER, table slides; 3/4/21 added supine full arc shoulder flexion from side to overhead and sidelying ER to HEP; 3/11/21 added rock[place] with peach band to HEP; 3/25/21 gave orange band for HEP  [x]  No new Education completed  []  Reviewed today's ROM measurements with pt. And how they compare to previous assessment. Discussed goals and POC. [x]  Patient verbalized and/or demonstrated understanding of education provided. []  Patient unable to verbalize and/or demonstrate understanding of education provided. Will continue education. [x]  Barriers to learning: none    PLAN:  Treatment Recommendations: Strengthening, Range of Motion, Manual Therapy - Soft Tissue Mobilization and Home Exercise Program    []  Plan of care initiated. Plan to see patient 2 times per week for 6 weeks to address the treatment planned outlined above.   [x]  Continue with current plan of care through April 27  []  Modify plan of care as follows: Continue 2x a week for 4 additional weeks through April 27, 2021   []  Hold pending physician visit  []  Discharge    Time In 1615   Time Out 1700       Timed Code Minutes: Minutes Units   ADL ([id number])     Aquatics (43916)     Manual Therapy (96440)     Neuro (19919)     Th. Activities (13529)     Th. Exercise (13911) 45 3   Wheelchair Mgmt (50147)     Cognitive Function (1st 15 min - 00168)     Cognitive Function (per sub.  15 min - 20839)     Ultrasound (75760)     Ionto (61807)     Manual E-Stim (46741)          TOTAL TREATMENT TIME: 39 minutes       KARINA Thakkar VILCHIS/L #[id number]

## 2021-04-27 ENCOUNTER — HOSPITAL ENCOUNTER (OUTPATIENT)
Dept: OCCUPATIONAL THERAPY | Age: 66
Setting detail: THERAPIES SERIES
Discharge: HOME | End: 2021-04-27
Payer: COMMERCIAL

## 2021-04-27 PROCEDURE — 97110 THERAPEUTIC EXERCISES: CPT

## 2021-04-27 NOTE — DISCHARGE SUMMARY
[address] THERAPY  [] EVALUATION  [] DAILY NOTE (LAND) [] DAILY NOTE (AQUATIC )   [] PROGRESS NOTE [x] DISCHARGE NOTE    [x] OUTPATIENT REHABILITATION [facility]   [] [place] [address]    [] [facility]   [] Julieth Snowball     Date: 2021  Patient Name:  Nik Liriano  : [birth year]  MRN: [id number]  CSN: [id number]    Referring Practitioner Rosy Blancas MD   Diagnosis Impingement syndrome of left shoulder [M75.42]  Strain of muscle(s) and tendon(s) of the rotator cuff of left shoulder, initial encounter [S46.012A]    Treatment Diagnosis Left shoulder scope 21   Date of Evaluation 21      Functional Outcome Measure Used UEFS   Functional Outcome Score 50 (21); 63/80 (3/2/21); 76/80 (21)      Insurance: Primary: Payor: Zoey Smith /  /  / ,   Secondary:    Authorization Information: No visit limit, modalities covered   Visit # 24, 7/10; Discharge 21; PN completed 3/2/21; and 3/30/21; Visits Allowed: None   Recertification Date: 2021   Physician Follow-Up: May 18th, 2021   Physician Orders: Tania Hill, PROM left shoulder/ Heat/US/Massage; Increase strength and ROM   Pertinent History: Pt. Had been having pain for 9 months in left shoulder after having to use crutches due to a hip issue. Pt. Had surgery 21 for a shoulder scope. Pt. States she did not have RC tear but some fraying that the doctor cleaned up. Pt. Not wearing a sling today and was told she could wean out of it. SUBJECTIVE: Patient reports she has been working on strengthening at home with no questions regarding technique. Relates she has an increased awareness to avoid compensatory techniques. TREATMENT   Precautions:    Pain: No pain at rest today.       X in shaded column indicates Activity Completed Today   Modalities Parameters/  Location  Notes/Comments                     Manual Therapy Time/  Technique  Notes/Comments but has been working on strengthening that motion. DISCHARGE 4/27/21     2. Pt. Will demo improved strength/endurance left shoulder to be able to keep LUE up long enough to style her hair without fatigue in shoulder. GOAL MET Patient reports she has the reach to style her hair, will get MILD fatigue with keeping her arm overhead for a long period of time. DISCHARGE 4/27/21     3. Pt. Will be able to push up from a chair with BUE without pain in left shoulder. GOAL MET, DISCHARGE 4/27/21      Patient Education:   [x]  HEP/Education Completed: Reviewed progress towards goals, HEP, and plan of care to discharge.  Medbridge Access HEP: scap pinches, scap depression, supine dowel for ER, table slides; 3/4/21 added supine full arc shoulder flexion from side to overhead and sidelying ER to HEP; 3/11/21 added [place] with peach band to HEP; 3/25/21 gave orange band for HEP  []  No new Education completed  []  Reviewed today's ROM measurements with pt. And how they compare to previous assessment. Discussed goals and POC. [x]  Patient verbalized and/or demonstrated understanding of education provided. []  Patient unable to verbalize and/or demonstrate understanding of education provided. Will continue education. [x]  Barriers to learning: none    PLAN:  Treatment Recommendations: Strengthening, Range of Motion, Manual Therapy - Soft Tissue Mobilization and Home Exercise Program    []  Plan of care initiated. Plan to see patient 2 times per week for 6 weeks to address the treatment planned outlined above.   []  Continue with current plan of care through April 27  []  Modify plan of care as follows: Continue 2x a week for 4 additional weeks through April 27, 2021   []  Hold pending physician visit  [x]  Discharge    Time In 0830   Time Out 1915       Timed Code Minutes: Minutes Units   ADL (58267)     Aquatics (18855)     Manual Therapy (89104)     Neuro (77140)     Th. Activities (59524)     Th. Exercise (37020) 45 3   Wheelchair Mgmt (52046)     Cognitive Function (1st 15 min - [id number])     Cognitive Function (per sub.  15 min - 74831)     Ultrasound (48318)     Ionto (90359)     Manual E-Stim (33178)          TOTAL TREATMENT TIME: 39 minutes       KARINA VILCHIS/CECIL #[id number]

## 2021-06-15 ENCOUNTER — NURSE ONLY (OUTPATIENT)
Dept: LAB | Age: 66
End: 2021-06-15

## 2021-06-15 DIAGNOSIS — D50.9 IRON DEFICIENCY ANEMIA, UNSPECIFIED IRON DEFICIENCY ANEMIA TYPE: ICD-10-CM

## 2021-06-15 DIAGNOSIS — D64.9 LOW HEMOGLOBIN: ICD-10-CM

## 2021-06-15 LAB
ERYTHROCYTE [DISTWIDTH] IN BLOOD BY AUTOMATED COUNT: 13.9 % (ref 11.5–14.5)
ERYTHROCYTE [DISTWIDTH] IN BLOOD BY AUTOMATED COUNT: 53.2 FL (ref 35–45)
HCT VFR BLD CALC: 42.8 % (ref 37–47)
HEMOGLOBIN: 13.2 GM/DL (ref 12–16)
IRON: 64 UG/DL (ref 50–170)
MCH RBC QN AUTO: 32.2 PG (ref 26–33)
MCHC RBC AUTO-ENTMCNC: 30.8 GM/DL (ref 32.2–35.5)
MCV RBC AUTO: 104.4 FL (ref 81–99)
PLATELET # BLD: 180 THOU/MM3 (ref 130–400)
PMV BLD AUTO: 10.3 FL (ref 9.4–12.4)
RBC # BLD: 4.1 MILL/MM3 (ref 4.2–5.4)
WBC # BLD: 4.9 THOU/MM3 (ref 4.8–10.8)

## 2021-08-26 RX ORDER — TRAZODONE HYDROCHLORIDE 50 MG/1
TABLET ORAL
Qty: 30 TABLET | Refills: 2 | Status: SHIPPED | OUTPATIENT
Start: 2021-08-26 | End: 2021-11-22

## 2021-10-26 ENCOUNTER — HOSPITAL ENCOUNTER (OUTPATIENT)
Dept: WOMENS IMAGING | Age: 66
Discharge: HOME | End: 2021-10-26
Payer: MEDICARE

## 2021-10-26 DIAGNOSIS — Z12.31 VISIT FOR SCREENING MAMMOGRAM: ICD-10-CM

## 2021-10-26 PROCEDURE — 77063 BREAST TOMOSYNTHESIS BI: CPT

## 2021-10-29 ENCOUNTER — HOSPITAL ENCOUNTER (OUTPATIENT)
Dept: WOMENS IMAGING | Age: 66
Discharge: HOME | End: 2021-10-28
Payer: MEDICARE

## 2021-10-29 DIAGNOSIS — R92.1 BREAST CALCIFICATIONS: ICD-10-CM

## 2021-10-29 PROCEDURE — G0279 TOMOSYNTHESIS, MAMMO: HCPCS

## 2021-11-22 RX ORDER — TRAZODONE HYDROCHLORIDE 50 MG/1
TABLET ORAL
Qty: 30 TABLET | Refills: 0 | Status: SHIPPED | OUTPATIENT
Start: 2021-11-22 | End: 2021-12-06 | Stop reason: SDUPTHER

## 2021-11-22 NOTE — TELEPHONE ENCOUNTER
This medication refill is regarding a electronic request.  Refill requested by Malika Dorado. Requested Prescriptions     Pending Prescriptions Disp Refills    traZODone (DESYREL) 50 MG tablet [Pharmacy Med Name: traZODone HCl Oral Tablet 50 MG] 30 tablet 0     Sig: TAKE 1 TABLET BY MOUTH IN THE EVENING AS NEEDED FOR SLEEP     Date of last visit: 10/7/2020   Date of next visit: None  Date of last refill: 8/26/2021 #30/2    Rx verified, ordered and set to EP.

## 2021-11-22 NOTE — TELEPHONE ENCOUNTER
Rx EP'd to pharmacy. Please notify patient. Requested Prescriptions     Signed Prescriptions Disp Refills    traZODone (DESYREL) 50 MG tablet 30 tablet 0     Sig: TAKE 1 TABLET BY MOUTH IN THE EVENING AS NEEDED FOR SLEEP     Authorizing Provider: Magaly Whitman     Needs appt. Not seen here in over a year.       Electronically signed by David Argueta MD on 11/22/2021 at 12:12 PM

## 2021-11-22 NOTE — TELEPHONE ENCOUNTER
Spoke with patient and let her know the prescription was sent into the pharmacy and let her know she was due for an appointment. Schedule her an annual appointment 12/6/2021 @ 1:30PM with JULIA.

## 2021-12-01 RX ORDER — TRAZODONE HYDROCHLORIDE 50 MG/1
TABLET ORAL
Qty: 30 TABLET | Refills: 0 | OUTPATIENT
Start: 2021-12-01

## 2021-12-01 NOTE — TELEPHONE ENCOUNTER
Refused due to being filled 11/22/2021 #30/0 to [facility], patient does not need a refill at this time.

## 2021-12-06 ENCOUNTER — OFFICE VISIT (OUTPATIENT)
Dept: FAMILY MEDICINE CLINIC | Age: 66
End: 2021-12-06
Payer: MEDICARE

## 2021-12-06 VITALS
WEIGHT: 202 LBS | HEIGHT: 68 IN | RESPIRATION RATE: 14 BRPM | SYSTOLIC BLOOD PRESSURE: 122 MMHG | BODY MASS INDEX: 30.62 KG/M2 | DIASTOLIC BLOOD PRESSURE: 78 MMHG | HEART RATE: 68 BPM

## 2021-12-06 DIAGNOSIS — D50.9 IRON DEFICIENCY ANEMIA, UNSPECIFIED IRON DEFICIENCY ANEMIA TYPE: ICD-10-CM

## 2021-12-06 DIAGNOSIS — M46.1 SACROILIAC INFLAMMATION (HCC): ICD-10-CM

## 2021-12-06 DIAGNOSIS — E53.8 B12 DEFICIENCY: ICD-10-CM

## 2021-12-06 DIAGNOSIS — G47.8 NON-RESTORATIVE SLEEP: ICD-10-CM

## 2021-12-06 DIAGNOSIS — Z23 NEED FOR PNEUMOCOCCAL VACCINE: ICD-10-CM

## 2021-12-06 DIAGNOSIS — M79.672 PAIN IN BOTH FEET: ICD-10-CM

## 2021-12-06 DIAGNOSIS — M79.671 PAIN IN BOTH FEET: ICD-10-CM

## 2021-12-06 DIAGNOSIS — Z00.00 ROUTINE GENERAL MEDICAL EXAMINATION AT A HEALTH CARE FACILITY: Primary | ICD-10-CM

## 2021-12-06 DIAGNOSIS — E55.9 VITAMIN D DEFICIENCY: ICD-10-CM

## 2021-12-06 DIAGNOSIS — K90.9 INTESTINAL MALABSORPTION, UNSPECIFIED TYPE: ICD-10-CM

## 2021-12-06 DIAGNOSIS — N25.81 SECONDARY HYPERPARATHYROIDISM (HCC): ICD-10-CM

## 2021-12-06 DIAGNOSIS — E78.5 DYSLIPIDEMIA: ICD-10-CM

## 2021-12-06 PROCEDURE — 1123F ACP DISCUSS/DSCN MKR DOCD: CPT | Performed by: FAMILY MEDICINE

## 2021-12-06 PROCEDURE — 90732 PPSV23 VACC 2 YRS+ SUBQ/IM: CPT | Performed by: FAMILY MEDICINE

## 2021-12-06 PROCEDURE — G0402 INITIAL PREVENTIVE EXAM: HCPCS | Performed by: FAMILY MEDICINE

## 2021-12-06 PROCEDURE — 3017F COLORECTAL CA SCREEN DOC REV: CPT | Performed by: FAMILY MEDICINE

## 2021-12-06 PROCEDURE — G0009 ADMIN PNEUMOCOCCAL VACCINE: HCPCS | Performed by: FAMILY MEDICINE

## 2021-12-06 PROCEDURE — 4040F PNEUMOC VAC/ADMIN/RCVD: CPT | Performed by: FAMILY MEDICINE

## 2021-12-06 PROCEDURE — G8484 FLU IMMUNIZE NO ADMIN: HCPCS | Performed by: FAMILY MEDICINE

## 2021-12-06 RX ORDER — TRAZODONE HYDROCHLORIDE 50 MG/1
TABLET ORAL
Qty: 30 TABLET | Refills: 5 | Status: SHIPPED | OUTPATIENT
Start: 2021-12-06 | End: 2022-06-27

## 2021-12-06 SDOH — ECONOMIC STABILITY: FOOD INSECURITY: WITHIN THE PAST 12 MONTHS, THE FOOD YOU BOUGHT JUST DIDN'T LAST AND YOU DIDN'T HAVE MONEY TO GET MORE.: NEVER TRUE

## 2021-12-06 SDOH — ECONOMIC STABILITY: FOOD INSECURITY: WITHIN THE PAST 12 MONTHS, YOU WORRIED THAT YOUR FOOD WOULD RUN OUT BEFORE YOU GOT MONEY TO BUY MORE.: NEVER TRUE

## 2021-12-06 ASSESSMENT — PATIENT HEALTH QUESTIONNAIRE - PHQ9
SUM OF ALL RESPONSES TO PHQ9 QUESTIONS 1 & 2: 0
1. LITTLE INTEREST OR PLEASURE IN DOING THINGS: 0
SUM OF ALL RESPONSES TO PHQ QUESTIONS 1-9: 0
2. FEELING DOWN, DEPRESSED OR HOPELESS: 0
SUM OF ALL RESPONSES TO PHQ QUESTIONS 1-9: 0
SUM OF ALL RESPONSES TO PHQ QUESTIONS 1-9: 0

## 2021-12-06 ASSESSMENT — LIFESTYLE VARIABLES: HOW OFTEN DO YOU HAVE A DRINK CONTAINING ALCOHOL: 0

## 2021-12-06 ASSESSMENT — SOCIAL DETERMINANTS OF HEALTH (SDOH): HOW HARD IS IT FOR YOU TO PAY FOR THE VERY BASICS LIKE FOOD, HOUSING, MEDICAL CARE, AND HEATING?: NOT HARD AT ALL
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
## 2021-12-06 NOTE — PATIENT INSTRUCTIONS
Personalized Preventive Plan for Carl [address] - [birth year]  Medicare offers a range of preventive health benefits. Some of the tests and screenings are paid in full while other may be subject to a deductible, co-insurance, and/or copay. Some of these benefits include a comprehensive review of your medical history including lifestyle, illnesses that may run in your family, and various assessments and screenings as appropriate. After reviewing your medical record and screening and assessments performed today your provider may have ordered immunizations, labs, imaging, and/or referrals for you. A list of these orders (if applicable) as well as your Preventive Care list are included within your After Visit Summary for your review. Other Preventive Recommendations:    · A preventive eye exam performed by an eye specialist is recommended every 1-2 years to screen for glaucoma; cataracts, macular degeneration, and other eye disorders. · A preventive dental visit is recommended every 6 months. · Try to get at least 150 minutes of exercise per week or 10,000 steps per day on a pedometer . · Order or download the FREE \"Exercise & Physical Activity: Your Everyday Guide\" from [employer]. Call 3-544.781.3048 or search [employer] online. · You need 1622-9490 mg of calcium and 7155-9040 IU of vitamin D per day. It is possible to meet your calcium requirement with diet alone, but a vitamin D supplement is usually necessary to meet this goal.  · When exposed to the sun, use a sunscreen that protects against both UVA and UVB radiation with an SPF of 30 or greater. Reapply every 2 to 3 hours or after sweating, drying off with a towel, or swimming. · Always wear a seat belt when traveling in a car. Always wear a helmet when riding a bicycle or motorcycle.

## 2021-12-06 NOTE — PROGRESS NOTES
Immunizations Administered     Name Date Dose Route    Pneumococcal Polysaccharide ([id number]) 12/6/2021 0.5 mL Intramuscular    Site: Deltoid- Left    Lot: [id number]    NDC: 2868-2771-31          The injection above was given by Guero Arcos CMA. Pt tolerated well.

## 2021-12-13 DIAGNOSIS — E55.9 VITAMIN D DEFICIENCY: ICD-10-CM

## 2021-12-14 RX ORDER — ERGOCALCIFEROL 1.25 MG/1
CAPSULE ORAL
Qty: 24 CAPSULE | Refills: 3 | Status: SHIPPED | OUTPATIENT
Start: 2021-12-14

## 2021-12-14 NOTE — TELEPHONE ENCOUNTER
Patient's last appointment was : 12/6/2021  Patient's next appointment is : Visit date not found  Last refilled:04/01/2021

## 2021-12-16 ENCOUNTER — HOSPITAL ENCOUNTER (OUTPATIENT)
Dept: GENERAL RADIOLOGY | Age: 66
Discharge: HOME | End: 2021-12-16
Payer: MEDICARE

## 2021-12-16 ENCOUNTER — HOSPITAL ENCOUNTER (OUTPATIENT)
Age: 66
Discharge: HOME | End: 2021-12-16
Payer: MEDICARE

## 2021-12-16 DIAGNOSIS — E55.9 VITAMIN D DEFICIENCY: ICD-10-CM

## 2021-12-16 DIAGNOSIS — K90.9 INTESTINAL MALABSORPTION, UNSPECIFIED TYPE: ICD-10-CM

## 2021-12-16 DIAGNOSIS — E78.5 DYSLIPIDEMIA: ICD-10-CM

## 2021-12-16 DIAGNOSIS — M79.671 PAIN IN BOTH FEET: ICD-10-CM

## 2021-12-16 DIAGNOSIS — M79.672 PAIN IN BOTH FEET: ICD-10-CM

## 2021-12-16 DIAGNOSIS — D50.9 IRON DEFICIENCY ANEMIA, UNSPECIFIED IRON DEFICIENCY ANEMIA TYPE: ICD-10-CM

## 2021-12-16 DIAGNOSIS — E53.8 B12 DEFICIENCY: ICD-10-CM

## 2021-12-16 LAB
ALBUMIN SERPL-MCNC: 4.4 G/DL (ref 3.5–5.1)
ALP BLD-CCNC: 91 U/L (ref 38–126)
ALT SERPL-CCNC: 20 U/L (ref 11–66)
ANION GAP SERPL CALCULATED.3IONS-SCNC: 10 MEQ/L (ref 8–16)
AST SERPL-CCNC: 23 U/L (ref 5–40)
BASOPHILS # BLD: 0.5 %
BASOPHILS ABSOLUTE: 0 THOU/MM3 (ref 0–0.1)
BILIRUB SERPL-MCNC: 1.3 MG/DL (ref 0.3–1.2)
BUN BLDV-MCNC: 15 MG/DL (ref 7–22)
CALCIUM SERPL-MCNC: 9 MG/DL (ref 8.5–10.5)
CHLORIDE BLD-SCNC: 109 MEQ/L (ref 98–111)
CHOLESTEROL, TOTAL: 144 MG/DL (ref 100–199)
CO2: 26 MEQ/L (ref 23–33)
CREAT SERPL-MCNC: 0.6 MG/DL (ref 0.4–1.2)
EOSINOPHIL # BLD: 2 %
EOSINOPHILS ABSOLUTE: 0.1 THOU/MM3 (ref 0–0.4)
ERYTHROCYTE [DISTWIDTH] IN BLOOD BY AUTOMATED COUNT: 12.3 % (ref 11.5–14.5)
ERYTHROCYTE [DISTWIDTH] IN BLOOD BY AUTOMATED COUNT: 46.7 FL (ref 35–45)
GFR SERPL CREATININE-BSD FRML MDRD: > 90 ML/MIN/1.73M2
GLUCOSE BLD-MCNC: 85 MG/DL (ref 70–108)
HCT VFR BLD CALC: 42.3 % (ref 37–47)
HDLC SERPL-MCNC: 61 MG/DL
HEMOGLOBIN: 13.6 GM/DL (ref 12–16)
IMMATURE GRANS (ABS): 0.01 THOU/MM3 (ref 0–0.07)
IMMATURE GRANULOCYTES: 0.2 %
IRON: 126 UG/DL (ref 50–170)
LDL CHOLESTEROL CALCULATED: 72 MG/DL
LYMPHOCYTES # BLD: 31.4 %
LYMPHOCYTES ABSOLUTE: 1.3 THOU/MM3 (ref 1–4.8)
MCH RBC QN AUTO: 33.3 PG (ref 26–33)
MCHC RBC AUTO-ENTMCNC: 32.2 GM/DL (ref 32.2–35.5)
MCV RBC AUTO: 103.7 FL (ref 81–99)
MONOCYTES # BLD: 7.6 %
MONOCYTES ABSOLUTE: 0.3 THOU/MM3 (ref 0.4–1.3)
NUCLEATED RED BLOOD CELLS: 0 /100 WBC
PLATELET # BLD: 136 THOU/MM3 (ref 130–400)
PMV BLD AUTO: 10.5 FL (ref 9.4–12.4)
POTASSIUM SERPL-SCNC: 5 MEQ/L (ref 3.5–5.2)
RBC # BLD: 4.08 MILL/MM3 (ref 4.2–5.4)
SEG NEUTROPHILS: 58.3 %
SEGMENTED NEUTROPHILS ABSOLUTE COUNT: 2.4 THOU/MM3 (ref 1.8–7.7)
SODIUM BLD-SCNC: 145 MEQ/L (ref 135–145)
T4 FREE: 0.95 NG/DL (ref 0.93–1.76)
TOTAL PROTEIN: 6.1 G/DL (ref 6.1–8)
TRIGL SERPL-MCNC: 57 MG/DL (ref 0–199)
TSH SERPL DL<=0.05 MIU/L-ACNC: 2.26 UIU/ML (ref 0.4–4.2)
VITAMIN D 25-HYDROXY: 46 NG/ML (ref 30–100)
WBC # BLD: 4.1 THOU/MM3 (ref 4.8–10.8)

## 2021-12-16 PROCEDURE — 82746 ASSAY OF FOLIC ACID SERUM: CPT

## 2021-12-16 PROCEDURE — 73630 X-RAY EXAM OF FOOT: CPT

## 2021-12-16 PROCEDURE — 82306 VITAMIN D 25 HYDROXY: CPT

## 2021-12-16 PROCEDURE — 84443 ASSAY THYROID STIM HORMONE: CPT

## 2021-12-16 PROCEDURE — 80053 COMPREHEN METABOLIC PANEL: CPT

## 2021-12-16 PROCEDURE — 84439 ASSAY OF FREE THYROXINE: CPT

## 2021-12-16 PROCEDURE — 82607 VITAMIN B-12: CPT

## 2021-12-16 PROCEDURE — 80061 LIPID PANEL: CPT

## 2021-12-16 PROCEDURE — 85025 COMPLETE CBC W/AUTO DIFF WBC: CPT

## 2021-12-16 PROCEDURE — 83540 ASSAY OF IRON: CPT

## 2021-12-16 PROCEDURE — 36415 COLL VENOUS BLD VENIPUNCTURE: CPT

## 2021-12-17 LAB
FOLATE: > 20 NG/ML (ref 4.8–24.2)
VITAMIN B-12: 170 PG/ML (ref 211–911)

## 2021-12-20 ENCOUNTER — TELEPHONE (OUTPATIENT)
Dept: FAMILY MEDICINE CLINIC | Age: 66
End: 2021-12-20

## 2021-12-20 DIAGNOSIS — E53.8 B12 DEFICIENCY: Primary | ICD-10-CM

## 2021-12-20 NOTE — TELEPHONE ENCOUNTER
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  Arthritis also noted.  Refer to podiatry.  CG

## 2021-12-20 NOTE — TELEPHONE ENCOUNTER
Spoke to pt. She said that she does not take any B12 supplements or B12 injections. She stated that she has done the injections in the past and is fine with starting them again if you'd like for her to. As for the referral she is going to research podiatrist and then get back to us via phone call or mychart on who she would like to see.

## 2021-12-20 NOTE — TELEPHONE ENCOUNTER
----- Message from Nj Emery MD sent at 12/17/2021 12:06 PM EST -----  Notify her that her labs look ok with the exception of low B12 at 170. See if she's taking any B12 supplement or doing any injections at this point. Also, see note attached to xrays.  CG

## 2021-12-21 RX ORDER — SYRINGE W-NEEDLE,DISPOSAB,3 ML 25GX5/8"
SYRINGE, EMPTY DISPOSABLE MISCELLANEOUS
Qty: 50 EACH | Refills: 0 | Status: SHIPPED | OUTPATIENT
Start: 2021-12-21 | End: 2022-03-04 | Stop reason: SDUPTHER

## 2021-12-21 RX ORDER — CYANOCOBALAMIN 1000 UG/ML
INJECTION INTRAMUSCULAR; SUBCUTANEOUS
Qty: 4 ML | Refills: 0 | Status: SHIPPED | OUTPATIENT
Start: 2021-12-21 | End: 2022-03-04 | Stop reason: SDUPTHER

## 2021-12-21 NOTE — TELEPHONE ENCOUNTER
Restart B12 injections with 1000mcg SC/IM qweek x 4 and then once monthly. Recheck B12 level in 3 months.  CG

## 2022-03-26 DIAGNOSIS — F32.A DEPRESSION, UNSPECIFIED DEPRESSION TYPE: ICD-10-CM

## 2022-03-28 NOTE — TELEPHONE ENCOUNTER
LM for pt letting her know I was calling about a request we received for her Prozac and asked she call the office back to clarify which pharmacy she would like the prescription sent to. If pt calls back please clarify where she would like her Prozac sent.

## 2022-03-29 ENCOUNTER — PATIENT MESSAGE (OUTPATIENT)
Dept: FAMILY MEDICINE CLINIC | Age: 67
End: 2022-03-29

## 2022-03-29 DIAGNOSIS — F32.A DEPRESSION, UNSPECIFIED DEPRESSION TYPE: ICD-10-CM

## 2022-03-29 RX ORDER — FLUOXETINE HYDROCHLORIDE 20 MG/1
CAPSULE ORAL
Qty: 60 CAPSULE | Refills: 11 | OUTPATIENT
Start: 2022-03-29

## 2022-03-29 RX ORDER — FLUOXETINE HYDROCHLORIDE 20 MG/1
CAPSULE ORAL
Qty: 60 CAPSULE | Refills: 11 | Status: SHIPPED | OUTPATIENT
Start: 2022-03-29

## 2022-03-29 NOTE — TELEPHONE ENCOUNTER
From: Jaylin Feliciano  To: Dr. Laurent Maillard: 3/29/2022 9:45 AM EDT  Subject: Pharmacy to use    Please send my [facility] to [facility].      Thanks Marcelina Roach

## 2022-03-29 NOTE — TELEPHONE ENCOUNTER
Rx EP'd to pharmacy. Please notify patient. Requested Prescriptions     Signed Prescriptions Disp Refills    FLUoxetine (PROZAC) 20 MG capsule 60 capsule 11     Sig: TAKE 2 CAPSULES BY MOUTH ONCE DAILY.      Authorizing Provider: Vivi Villalobos           Electronically signed by Tino Hooker MD on 3/29/2022 at 12:49 PM

## 2022-03-30 ENCOUNTER — HOSPITAL ENCOUNTER (OUTPATIENT)
Dept: WOMENS IMAGING | Age: 67
Discharge: HOME | End: 2022-03-30
Payer: MEDICARE

## 2022-03-30 DIAGNOSIS — R92.1 BREAST CALCIFICATIONS: ICD-10-CM

## 2022-03-30 DIAGNOSIS — Z09 FOLLOW UP: ICD-10-CM

## 2022-03-30 PROCEDURE — G0279 TOMOSYNTHESIS, MAMMO: HCPCS

## 2022-04-01 ENCOUNTER — TELEPHONE (OUTPATIENT)
Dept: FAMILY MEDICINE CLINIC | Age: 67
End: 2022-04-01

## 2022-04-01 DIAGNOSIS — F41.9 ANXIETY: Primary | ICD-10-CM

## 2022-04-01 RX ORDER — ALPRAZOLAM 0.25 MG/1
0.25 TABLET ORAL
Qty: 1 TABLET | Refills: 0 | Status: SHIPPED | OUTPATIENT
Start: 2022-04-01 | End: 2022-04-01

## 2022-04-01 NOTE — TELEPHONE ENCOUNTER
Noted.  OK for Xanax 0.25 mg for procedure. I would recommend taking it about an hour before appt. Call office with any questions or concerns, or if symptoms are getting worse or changing. -    Controlled Substance Monitoring:    Acute and Chronic Pain Monitoring:   RX Monitoring 4/1/2022   Periodic Controlled Substance Monitoring No signs of potential drug abuse or diversion identified.;Obtaining appropriate analgesic effect of treatment. Orders Placed This Encounter   Medications    ALPRAZolam (XANAX) 0.25 MG tablet     Sig: Take 1 tablet by mouth once as needed for Anxiety for up to 1 dose. Pre procedural as needed for anxiety.      Dispense:  1 tablet     Refill:  0

## 2022-04-01 NOTE — TELEPHONE ENCOUNTER
Patient is scheduled to have a breast biopsy this coming Monday. She is very apprehensive about the procedure and wonders if you might be willing to call her in a one time dose of a medication for anxiety. Patient specifically inquired about Xanax but would be willing to use whatever you feel might be helpful. Pharmacy info entered.   CPB

## 2022-04-04 ENCOUNTER — HOSPITAL ENCOUNTER (OUTPATIENT)
Dept: WOMENS IMAGING | Age: 67
Discharge: HOME | End: 2022-04-04
Payer: MEDICARE

## 2022-04-04 DIAGNOSIS — R92.1 BREAST CALCIFICATIONS: ICD-10-CM

## 2022-04-04 DIAGNOSIS — R92.1 CALCIFICATION OF RIGHT BREAST: ICD-10-CM

## 2022-04-04 PROCEDURE — 88305 TISSUE EXAM BY PATHOLOGIST: CPT

## 2022-04-04 PROCEDURE — 77065 DX MAMMO INCL CAD UNI: CPT

## 2022-04-04 PROCEDURE — 2709999900 MAM STEREO BREAST BX W LOC DEVICE 1ST LESION RIGHT

## 2022-04-04 NOTE — PROGRESS NOTES
Breast Biopsy Flowsheet/Post-Operative Care    Date of Procedure: 4/4/2022  Physician: Dr. Stephen Zapata  Technologist: Clinton Bowman RT(R)(M)    Biopsy:stereotactic breast biopsy  Lesion type: Non-palpable  Breast: right    Clock face position: Site #1: outer central         Primary Method of Detection: Mammogram      Microcalcifications   Distribution: Grouped        Biopsy Method:   Mammotome:    Site # 1    Gauge: 10    # of Passes: 11     Clip: Coil        Pre-Op Assessment: (BI-RADS)   4. Suspicious Abnormality    Patient Tolerated Procedure: good  Complications: n/a  Comments: Adjustment had to be made following the pre biopsy image. Another small incision was made in the breast.  After pressure was held, Dr. Stephen Zapata used glue to close both incisions. Steri strips applied when area dried.      Post Operative Care  Steri strips: Yes  Dressing: Gauze, Tape   Ice Applied to Site:  No  Evidence of Bleeding:  No    Pain Verbalized: No      Written Discharge Instructions: Yes  Condition at Discharge: good  Time of Discharge: Thony 24    Electronically signed by Itzel Vasquez on 4/4/2022 at 3:41 PM

## 2022-04-04 NOTE — PROGRESS NOTES
[facility] [address]  Pre-Biopsy Assessment      Patient Education    Written information about procedure Yes  right   Procedural steps explained Yes Stereotactic Biopsy   Post-op potential: bruising, hematoma, pain Yes    Self-care: activity, care of dressing Yes    Patient verbalized understanding Yes    Consent signed and witnessed Yes      Hormone Therapy Status: n/a    Recent Medication: N/A Last Dose: n/a                                     Hormone Replacement Therapy: no    Previous Breast Biopsy: no    Previous Diagnosis Cancer: no    Hysterectomy:yes - ovaries remain    Emotional Status: Calm    Language or Physical Barriers: n/a    Comments: n/a      Electronically signed by Brad Diana on 4/4/2022 at 12:53 PM

## 2022-04-05 ENCOUNTER — CLINICAL DOCUMENTATION (OUTPATIENT)
Dept: WOMENS IMAGING | Age: 67
End: 2022-04-05

## 2022-04-05 NOTE — PROGRESS NOTES
Contact Type :    Telephone  Notes :  After consulting with Dr. Joi Alba was contacted by telephone. She was informed of the negative biopsy results and the need to return for a 6 month follow up. She voiced understanding.     Biopsy site: doing well     Results faxed to: Dr. Oc Penn

## 2022-04-28 ENCOUNTER — PATIENT MESSAGE (OUTPATIENT)
Dept: FAMILY MEDICINE CLINIC | Age: 67
End: 2022-04-28

## 2022-04-28 RX ORDER — FUROSEMIDE 40 MG/1
40 TABLET ORAL DAILY PRN
Qty: 30 TABLET | Refills: 1 | Status: SHIPPED | OUTPATIENT
Start: 2022-04-28 | End: 2022-07-20

## 2022-04-28 NOTE — TELEPHONE ENCOUNTER
From: Jese Parrish  To: Dr. Reed Saver: 4/28/2022 8:47 AM EDT  Subject: Need Refill    Can I get a refill on my water pills.  Thanks

## 2022-04-28 NOTE — TELEPHONE ENCOUNTER
Requested Prescriptions     Pending Prescriptions Disp Refills    furosemide (LASIX) 40 MG tablet 30 tablet 1     Sig: Take 1 tablet by mouth daily as needed (swelling)       Date of last visit: 12/6/2021   Date of next visit: none  Date of last refill: 7/15/20 for 30/1  Pharmacy Name: [facility]    Last CMP:   Lab Results   Component Value Date     12/16/2021    K 5.0 12/16/2021     12/16/2021    CO2 26 12/16/2021    BUN 15 12/16/2021    CREATININE 0.6 12/16/2021    GLUCOSE 85 12/16/2021    CALCIUM 9.0 12/16/2021    PROT 6.1 12/16/2021    LABALBU 4.4 12/16/2021    BILITOT 1.3 (H) 12/16/2021    ALKPHOS 91 12/16/2021    AST 23 12/16/2021    ALT 20 12/16/2021    LABGLOM >90 12/16/2021    GFRAA >60 07/20/2011    AGRATIO 1.9 07/20/2011       Rx verified, ordered and set to EP.      WCP

## 2022-06-24 DIAGNOSIS — G47.8 NON-RESTORATIVE SLEEP: ICD-10-CM

## 2022-06-27 RX ORDER — TRAZODONE HYDROCHLORIDE 50 MG/1
TABLET ORAL
Qty: 30 TABLET | Refills: 5 | Status: SHIPPED | OUTPATIENT
Start: 2022-06-27

## 2022-06-27 NOTE — TELEPHONE ENCOUNTER
Request sent from [facility] for refill of trazodone 50 mg qhs prn. Last seen 12/6/21, no future appt scheduled. Med verified. Order pended.

## 2022-07-20 RX ORDER — FUROSEMIDE 40 MG/1
TABLET ORAL
Qty: 30 TABLET | Refills: 1 | Status: SHIPPED | OUTPATIENT
Start: 2022-07-20 | End: 2022-09-13

## 2022-07-20 NOTE — TELEPHONE ENCOUNTER
This medication refill is regarding a electronic request.  Refill requested by  Melania Gusman . Requested Prescriptions     Pending Prescriptions Disp Refills    furosemide (LASIX) 40 MG tablet [Pharmacy Med Name: Furosemide Oral Tablet 40 MG] 30 tablet 1     Sig: TAKE 1 TABLET BY MOUTH EVERY DAY AS NEEDED (SWELLING)     Date of last visit: 12/6/2021   Date of next visit: None  Date of last refill: 4/28/22 #30/1    Last CMP:   Lab Results   Component Value Date     12/16/2021    K 5.0 12/16/2021     12/16/2021    CO2 26 12/16/2021    BUN 15 12/16/2021    CREATININE 0.6 12/16/2021    GLUCOSE 85 12/16/2021    CALCIUM 9.0 12/16/2021    PROT 6.1 12/16/2021    LABALBU 4.4 12/16/2021    BILITOT 1.3 (H) 12/16/2021    ALKPHOS 91 12/16/2021    AST 23 12/16/2021    ALT 20 12/16/2021    LABGLOM >90 12/16/2021    GFRAA >60 07/20/2011    AGRATIO 1.9 07/20/2011     Rx verified, ordered and set to EP.

## 2022-08-11 ENCOUNTER — NURSE ONLY (OUTPATIENT)
Dept: LAB | Age: 67
End: 2022-08-11

## 2022-08-11 DIAGNOSIS — E53.8 B12 DEFICIENCY: ICD-10-CM

## 2022-08-12 LAB — VITAMIN B-12: 362 PG/ML (ref 211–911)

## 2022-09-13 RX ORDER — FUROSEMIDE 40 MG/1
TABLET ORAL
Qty: 30 TABLET | Refills: 0 | Status: SHIPPED | OUTPATIENT
Start: 2022-09-13 | End: 2022-10-10

## 2022-09-13 NOTE — TELEPHONE ENCOUNTER
Request sent from [facility] for lasix 40 mg qd prn. Last seen 12/6/21, no future appt scheduled. Med verified. Order pended.

## 2022-09-26 RX ORDER — FUROSEMIDE 40 MG/1
TABLET ORAL
Qty: 30 TABLET | Refills: 0 | OUTPATIENT
Start: 2022-09-26

## 2022-09-26 NOTE — TELEPHONE ENCOUNTER
Furosemide refused due to being filled 9/13/22 #30/0 to [facility]. Pt does not need a refill at this time.

## 2022-10-05 ENCOUNTER — APPOINTMENT (OUTPATIENT)
Dept: WOMENS IMAGING | Age: 67
End: 2022-10-05
Payer: MEDICARE

## 2022-10-05 ENCOUNTER — HOSPITAL ENCOUNTER (OUTPATIENT)
Dept: WOMENS IMAGING | Age: 67
Discharge: HOME | End: 2022-10-05
Payer: MEDICARE

## 2022-10-05 DIAGNOSIS — R92.1 CALCIFICATION OF RIGHT BREAST: ICD-10-CM

## 2022-10-05 DIAGNOSIS — Z09 FOLLOW-UP EXAM: ICD-10-CM

## 2022-10-05 PROCEDURE — G0279 TOMOSYNTHESIS, MAMMO: HCPCS

## 2022-10-10 RX ORDER — FUROSEMIDE 40 MG/1
TABLET ORAL
Qty: 90 TABLET | Refills: 0 | Status: SHIPPED | OUTPATIENT
Start: 2022-10-10

## 2022-10-10 NOTE — TELEPHONE ENCOUNTER
This medication refill is regarding a electronic request.  Refill requested by  Stevie Hernandez . Requested Prescriptions     Pending Prescriptions Disp Refills    furosemide (LASIX) 40 MG tablet [Pharmacy Med Name: Furosemide Oral Tablet 40 MG] 30 tablet 0     Sig: TAKE 1 TABLET BY MOUTH EVERY DAY AS NEEDED for swelling     Date of last visit: 12/6/2021   Date of next visit: None  Date of last refill: 9/13/22 #30/0    Last CMP:   Lab Results   Component Value Date     12/16/2021    K 5.0 12/16/2021     12/16/2021    CO2 26 12/16/2021    BUN 15 12/16/2021    CREATININE 0.6 12/16/2021    GLUCOSE 85 12/16/2021    CALCIUM 9.0 12/16/2021    PROT 6.1 12/16/2021    LABALBU 4.4 12/16/2021    BILITOT 1.3 (H) 12/16/2021    ALKPHOS 91 12/16/2021    AST 23 12/16/2021    ALT 20 12/16/2021    LABGLOM >90 12/16/2021    GFRAA >60 07/20/2011    AGRATIO 1.9 07/20/2011     Rx verified, ordered and set to EP.

## 2022-11-09 ENCOUNTER — E-VISIT (OUTPATIENT)
Dept: PRIMARY CARE CLINIC | Age: 67
End: 2022-11-09
Payer: MEDICARE

## 2022-11-09 DIAGNOSIS — J06.9 UPPER RESPIRATORY TRACT INFECTION, UNSPECIFIED TYPE: Primary | ICD-10-CM

## 2022-11-09 PROCEDURE — 99422 OL DIG E/M SVC 11-20 MIN: CPT | Performed by: NURSE PRACTITIONER

## 2022-11-09 RX ORDER — AZITHROMYCIN 250 MG/1
TABLET, FILM COATED ORAL
Qty: 6 TABLET | Refills: 0 | Status: SHIPPED | OUTPATIENT
Start: 2022-11-09 | End: 2022-11-19

## 2022-11-09 ASSESSMENT — LIFESTYLE VARIABLES: SMOKING_STATUS: NO, I'VE NEVER SMOKED

## 2022-11-23 ENCOUNTER — APPOINTMENT (OUTPATIENT)
Dept: GENERAL RADIOLOGY | Age: 67
End: 2022-11-23
Payer: MEDICARE

## 2022-11-23 ENCOUNTER — E-VISIT (OUTPATIENT)
Dept: PRIMARY CARE CLINIC | Age: 67
End: 2022-11-23
Payer: MEDICARE

## 2022-11-23 ENCOUNTER — HOSPITAL ENCOUNTER (EMERGENCY)
Age: 67
Discharge: HOME | End: 2022-11-23
Payer: MEDICARE

## 2022-11-23 VITALS
SYSTOLIC BLOOD PRESSURE: 115 MMHG | DIASTOLIC BLOOD PRESSURE: 57 MMHG | HEART RATE: 64 BPM | OXYGEN SATURATION: 93 % | RESPIRATION RATE: 16 BRPM | TEMPERATURE: 99.5 F

## 2022-11-23 DIAGNOSIS — J10.1 INFLUENZA A: Primary | ICD-10-CM

## 2022-11-23 DIAGNOSIS — J06.9 VIRAL UPPER RESPIRATORY TRACT INFECTION: Primary | ICD-10-CM

## 2022-11-23 LAB
FLU A ANTIGEN: POSITIVE
FLU B ANTIGEN: NEGATIVE

## 2022-11-23 PROCEDURE — 99213 OFFICE O/P EST LOW 20 MIN: CPT

## 2022-11-23 PROCEDURE — 71046 X-RAY EXAM CHEST 2 VIEWS: CPT

## 2022-11-23 PROCEDURE — 99213 OFFICE O/P EST LOW 20 MIN: CPT | Performed by: EMERGENCY MEDICINE

## 2022-11-23 PROCEDURE — 99422 OL DIG E/M SVC 11-20 MIN: CPT | Performed by: NURSE PRACTITIONER

## 2022-11-23 PROCEDURE — 87804 INFLUENZA ASSAY W/OPTIC: CPT

## 2022-11-23 RX ORDER — OSELTAMIVIR PHOSPHATE 75 MG/1
75 CAPSULE ORAL 2 TIMES DAILY
Qty: 10 CAPSULE | Refills: 0 | Status: SHIPPED | OUTPATIENT
Start: 2022-11-23 | End: 2022-11-28

## 2022-11-23 ASSESSMENT — LIFESTYLE VARIABLES: SMOKING_STATUS: NO, I'VE NEVER SMOKED

## 2022-11-23 ASSESSMENT — ENCOUNTER SYMPTOMS
SHORTNESS OF BREATH: 1
SORE THROAT: 0
SINUS PAIN: 1
ABDOMINAL PAIN: 0
COUGH: 1
SINUS PRESSURE: 1

## 2022-11-23 ASSESSMENT — PAIN DESCRIPTION - LOCATION: LOCATION: HEAD

## 2022-11-23 ASSESSMENT — PAIN SCALES - GENERAL: PAINLEVEL_OUTOF10: 6

## 2022-11-23 ASSESSMENT — PAIN - FUNCTIONAL ASSESSMENT: PAIN_FUNCTIONAL_ASSESSMENT: 0-10

## 2022-11-23 NOTE — PROGRESS NOTES
Michael Wise ([birth year]) initiated an asynchronous digital communication through [employer]. HPI: per patient questionnaire     Exam: not applicable    Diagnoses and all orders for this visit:  There are no diagnoses linked to this encounter. 11/9 z pack   Pt was just treated with antibiotics. Still not feeling well. Recommended inperson evaluation. After e visit, pt went to the ED and was + for influenza A   Supportive care measures were provided  Recommend f/u with pcp   Time: EV2 - 11-20 minutes were spent on the digital evaluation and management of this patient.  15 min     Sairna Creole, APRN - CNP

## 2022-11-23 NOTE — ED TRIAGE NOTES
Started 1 st of month, called family doctor and had a coarse of antibiotic started to feel better and  since cough again has gotten worse and started with a fever (101) yesterday

## 2022-11-23 NOTE — DISCHARGE INSTRUCTIONS
Continue Mucinex. Use over-the-counter Sudafed with this to help with the congestion. Tylenol/ibuprofen, alternate every 3 hours to break your fever and for your body aches. Drink plenty of fluids.   Return for worsening cough, chest pain, shortness of breath or any new concerns

## 2022-11-23 NOTE — ED PROVIDER NOTES
[facility]  Urgent Care Encounter       CHIEF COMPLAINT       Chief Complaint   Patient presents with    Cough       Nurses Notes reviewed and I agree except as noted in the HPI. HISTORY OF PRESENT ILLNESS   Heidi Abrams is a 78 y.o. female who presents for 6 to 7 days of sinus congestion, cough, fever. Patient states she did not have fever at the beginning of the illness but started developing the fever last evening. States 101 temperature at home this morning. Her cough is nonproductive but is frequent. She has mild dyspnea with ambulation. She took an at home COVID test and this was negative. Patient reports that she was sick earlier in the month with a cough. She had a virtual visit with her doctor and was placed on Zithromax. She states her symptoms improved while on the antibiotic. They were better for an additional 4 or 5 days when this illness began. Patient has fullness in both ears. No sore throat. Denies nausea, vomiting or diarrhea. HPI    REVIEW OF SYSTEMS     Review of Systems   Constitutional:  Positive for activity change, fatigue and fever. HENT:  Positive for congestion, sinus pressure and sinus pain. Negative for sore throat. Respiratory:  Positive for cough and shortness of breath. Cardiovascular:  Negative for chest pain. Gastrointestinal:  Negative for abdominal pain. Neurological:  Positive for headaches. Negative for dizziness. Psychiatric/Behavioral:  Negative for behavioral problems. PAST MEDICAL HISTORY         Diagnosis Date    Chronic back pain     Lumbar spondylosis     Osteopenia     S/P colonoscopy with polypectomy     Vitamin D deficiency        SURGICALHISTORY     Patient  has a past surgical history that includes Anus surgery; Gastric bypass surgery (1996); Tonsillectomy; other surgical history (01/2011); Colonoscopy (2006); lumbar fusion; Cholecystectomy (1990); Rotator cuff repair (Right, 06/13/2014);  Finger surgery (Left, 01/01/2016); other surgical history (09/13/2017); Nerve Surgery (Bilateral, 07/22/2019); Injection Procedure For Sacroiliac Joint (Bilateral, 09/12/2019); Injection Procedure For Sacroiliac Joint (Bilateral, 10/25/2019); Lumbar spine surgery (Bilateral, 12/13/2019); Total hip arthroplasty (Right, 03/02/2020); joint replacement (Right); Abdominoplasty (N/A, 10/26/2020); Breast reduction surgery (Bilateral, 10/26/2020); Hysterectomy; and SACHA STEREO BREAST BX W LOC DEVICE 1ST LESION RIGHT (Right, 04/04/2022). CURRENT MEDICATIONS       Discharge Medication List as of 11/23/2022  3:10 PM        CONTINUE these medications which have NOT CHANGED    Details   furosemide (LASIX) 40 MG tablet TAKE 1 TABLET BY MOUTH EVERY DAY AS NEEDED for swelling, Disp-90 tablet, R-0Normal      traZODone (DESYREL) 50 MG tablet TAKE 1 TABLET BY MOUTH IN THE EVENING AS NEEDED FOR SLEEP, Disp-30 tablet, R-5Normal      FLUoxetine (PROZAC) 20 MG capsule TAKE 2 CAPSULES BY MOUTH ONCE DAILY. , Disp-60 capsule, R-11Normal      cyanocobalamin 1000 MCG/ML injection Start B12 injections with 1000mcg SC/IM qweek x 4 and then monthly thereafter., Disp-4 mL, R-0Normal      Syringe/Needle, Disp, (SYRINGE 3CC/25GX1\") 25G X 1\" 3 ML MISC Disp-50 each, R-0, NormalUse as directed with B12 injections Dx: B12 Deficiency      vitamin D (ERGOCALCIFEROL) 1.25 MG (56202 UT) CAPS capsule TAKE 1 CAPSULE BY MOUTH TWO TIMES A WEEK, Disp-24 capsule, R-3Normal      ferrous sulfate (IRON 325) 325 (65 Fe) MG tablet Take 325 mg by mouth daily (with breakfast) Historical Med      ibuprofen (ADVIL;MOTRIN) 200 MG tablet Take 200 mg by mouth every 6 hours as needed for PainHistorical Med             ALLERGIES     Patient is is allergic to penicillins.     Patients   Immunization History   Administered Date(s) Administered    COVID-19, MODERNA BLUE border, Primary or Immunocompromised, (age 12y+), IM, 100 mcg/0.5mL 12/28/2020, 01/25/2021, 11/05/2021    COVID-19, [address] [facility], (age 12y+), IM, 48 mcg/0.5 mL 09/26/2022    Influenza 10/30/2012, 10/01/2015    Influenza Vaccine, unspecified formulation 12/01/2016    Influenza Virus Vaccine 10/30/2012, 11/27/2013, 09/30/2014, 10/01/2015, 10/26/2017, 10/25/2018, 11/04/2019    Influenza Whole 11/27/2013, 09/30/2014    Influenza, FLUZONE (age 72 y+), High Dose, 0.7mL 10/05/2021    Pneumococcal Polysaccharide ([id number]) 12/06/2021    Td, unspecified formulation 01/02/2016    Zoster Live (Zostavax) 11/20/2012    Zoster Recombinant (Shingrix) 08/06/2021, 10/05/2021       FAMILY HISTORY     Patient's family history includes Diabetes in her mother and sister; Heart Disease in her father and paternal uncle; High Blood Pressure in her father, mother, and sister; Melanoma in her sister; Osteoporosis in her mother; Stroke in her mother. SOCIAL HISTORY     Patient  reports that she has never smoked. She has never used smokeless tobacco. She reports that she does not drink alcohol and does not use drugs. PHYSICAL EXAM     ED TRIAGE VITALS  BP: (!) 115/57, Temp: 99.5 °F (37.5 °C), Heart Rate: 64, Resp: 16, SpO2: 93 %,Estimated body mass index is 30.71 kg/m² as calculated from the following:    Height as of 12/6/21: 5' 8\" (1.727 m). Weight as of 12/6/21: 202 lb (91.6 kg). ,No LMP recorded. Patient has had a hysterectomy. Physical Exam  Constitutional:       Appearance: She is ill-appearing. She is not toxic-appearing. HENT:      Head: Normocephalic and atraumatic. Right Ear: Tympanic membrane, ear canal and external ear normal.      Left Ear: Tympanic membrane, ear canal and external ear normal.      Nose: Mucosal edema and rhinorrhea present. Right Sinus: No maxillary sinus tenderness or frontal sinus tenderness. Left Sinus: Frontal sinus tenderness present. No maxillary sinus tenderness. Mouth/Throat:      Pharynx: Oropharynx is clear.       Tonsils: No tonsillar exudate. 0 on the right. 0 on the left.   Cardiovascular:      Rate and Rhythm: Normal rate and regular rhythm. Pulses: Normal pulses. Heart sounds: Normal heart sounds. Pulmonary:      Effort: Pulmonary effort is normal. No tachypnea or respiratory distress. Breath sounds: Decreased breath sounds present. Abdominal:      General: Abdomen is flat. Bowel sounds are normal. There is no distension. Palpations: Abdomen is soft. Tenderness: There is no abdominal tenderness. Skin:     General: Skin is warm and dry. Coloration: Skin is not jaundiced. Findings: No bruising or rash. Neurological:      General: No focal deficit present. Mental Status: She is alert. DIAGNOSTIC RESULTS     Labs:  Results for orders placed or performed during the hospital encounter of 11/23/22   Rapid influenza A/B antigens   Result Value Ref Range    Flu A Antigen Positive (A) NEGATIVE    Flu B Antigen Negative NEGATIVE       IMAGING:    XR CHEST (2 VW)   Final Result   Stable radiographic appearance of the chest. No evidence of an acute process. **This report has been created using voice recognition software. It may contain minor errors which are inherent in voice recognition technology. **      Final report electronically signed by Dr. Yris Walker on 11/23/2022 2:56 PM            EKG:      URGENT CARE COURSE:     Vitals:    11/23/22 1427   BP: (!) 115/57   Pulse: 64   Resp: 16   Temp: 99.5 °F (37.5 °C)   TempSrc: Infrared   SpO2: 93%       Medications - No data to display         PROCEDURES:  None    FINAL IMPRESSION      1. Influenza A          DISPOSITION/ PLAN     Patient presents for what is determined to be influenza A. Chest x-ray was performed showing no acute intrathoracic process. The patient will be discharged. After discussion, I did prescribe Tamiflu as we are not sure when the true onset of her symptoms started. She will continue Mucinex and decongestant as needed.   Tylenol/ibuprofen for fever.  Push fluids. Return for new or worsening symptoms.       PATIENT REFERRED TO:  Delfina Torres MD  [address] [facility] [address]      DISCHARGE MEDICATIONS:  Discharge Medication List as of 11/23/2022  3:10 PM          Discharge Medication List as of 11/23/2022  3:10 PM          Discharge Medication List as of 11/23/2022  3:10 PM          PATEL Broderick CNP    (Please note that portions of this note were completed with a voice recognition program. Efforts were made to edit the dictations but occasionally words are mis-transcribed.)            PATEL Broderick CNP  11/23/22 1446

## 2022-12-08 SDOH — HEALTH STABILITY: PHYSICAL HEALTH: ON AVERAGE, HOW MANY MINUTES DO YOU ENGAGE IN EXERCISE AT THIS LEVEL?: 30 MIN

## 2022-12-08 SDOH — HEALTH STABILITY: PHYSICAL HEALTH: ON AVERAGE, HOW MANY DAYS PER WEEK DO YOU ENGAGE IN MODERATE TO STRENUOUS EXERCISE (LIKE A BRISK WALK)?: 2 DAYS

## 2022-12-08 ASSESSMENT — PATIENT HEALTH QUESTIONNAIRE - PHQ9
5. POOR APPETITE OR OVEREATING: 0
SUM OF ALL RESPONSES TO PHQ QUESTIONS 1-9: 0
8. MOVING OR SPEAKING SO SLOWLY THAT OTHER PEOPLE COULD HAVE NOTICED. OR THE OPPOSITE, BEING SO FIGETY OR RESTLESS THAT YOU HAVE BEEN MOVING AROUND A LOT MORE THAN USUAL: 0
10. IF YOU CHECKED OFF ANY PROBLEMS, HOW DIFFICULT HAVE THESE PROBLEMS MADE IT FOR YOU TO DO YOUR WORK, TAKE CARE OF THINGS AT HOME, OR GET ALONG WITH OTHER PEOPLE: 0
9. THOUGHTS THAT YOU WOULD BE BETTER OFF DEAD, OR OF HURTING YOURSELF: 0
SUM OF ALL RESPONSES TO PHQ QUESTIONS 1-9: 0
7. TROUBLE CONCENTRATING ON THINGS, SUCH AS READING THE NEWSPAPER OR WATCHING TELEVISION: 0
SUM OF ALL RESPONSES TO PHQ9 QUESTIONS 1 & 2: 0
4. FEELING TIRED OR HAVING LITTLE ENERGY: 0
SUM OF ALL RESPONSES TO PHQ QUESTIONS 1-9: 0
SUM OF ALL RESPONSES TO PHQ QUESTIONS 1-9: 0
2. FEELING DOWN, DEPRESSED OR HOPELESS: 0
1. LITTLE INTEREST OR PLEASURE IN DOING THINGS: 0
3. TROUBLE FALLING OR STAYING ASLEEP: 0
6. FEELING BAD ABOUT YOURSELF - OR THAT YOU ARE A FAILURE OR HAVE LET YOURSELF OR YOUR FAMILY DOWN: 0

## 2022-12-08 ASSESSMENT — LIFESTYLE VARIABLES
HOW MANY STANDARD DRINKS CONTAINING ALCOHOL DO YOU HAVE ON A TYPICAL DAY: 1 OR 2
HOW MANY STANDARD DRINKS CONTAINING ALCOHOL DO YOU HAVE ON A TYPICAL DAY: 1
HOW OFTEN DO YOU HAVE SIX OR MORE DRINKS ON ONE OCCASION: 1
HOW OFTEN DO YOU HAVE A DRINK CONTAINING ALCOHOL: 2
HOW OFTEN DO YOU HAVE A DRINK CONTAINING ALCOHOL: MONTHLY OR LESS

## 2022-12-09 ENCOUNTER — OFFICE VISIT (OUTPATIENT)
Dept: FAMILY MEDICINE CLINIC | Age: 67
End: 2022-12-09

## 2022-12-09 VITALS
HEART RATE: 60 BPM | DIASTOLIC BLOOD PRESSURE: 72 MMHG | RESPIRATION RATE: 16 BRPM | HEIGHT: 68 IN | BODY MASS INDEX: 30.46 KG/M2 | WEIGHT: 201 LBS | SYSTOLIC BLOOD PRESSURE: 120 MMHG

## 2022-12-09 DIAGNOSIS — E78.5 DYSLIPIDEMIA: ICD-10-CM

## 2022-12-09 DIAGNOSIS — D50.9 IRON DEFICIENCY ANEMIA, UNSPECIFIED IRON DEFICIENCY ANEMIA TYPE: ICD-10-CM

## 2022-12-09 DIAGNOSIS — Z00.00 INITIAL MEDICARE ANNUAL WELLNESS VISIT: Primary | ICD-10-CM

## 2022-12-09 DIAGNOSIS — E53.8 B12 DEFICIENCY: ICD-10-CM

## 2022-12-09 DIAGNOSIS — N25.81 SECONDARY HYPERPARATHYROIDISM (HCC): ICD-10-CM

## 2022-12-09 DIAGNOSIS — Z78.0 POSTMENOPAUSAL: ICD-10-CM

## 2022-12-09 DIAGNOSIS — E66.9 CLASS 1 OBESITY WITH BODY MASS INDEX (BMI) OF 30.0 TO 30.9 IN ADULT, UNSPECIFIED OBESITY TYPE, UNSPECIFIED WHETHER SERIOUS COMORBIDITY PRESENT: ICD-10-CM

## 2022-12-09 SDOH — ECONOMIC STABILITY: FOOD INSECURITY: WITHIN THE PAST 12 MONTHS, THE FOOD YOU BOUGHT JUST DIDN'T LAST AND YOU DIDN'T HAVE MONEY TO GET MORE.: NEVER TRUE

## 2022-12-09 SDOH — ECONOMIC STABILITY: FOOD INSECURITY: WITHIN THE PAST 12 MONTHS, YOU WORRIED THAT YOUR FOOD WOULD RUN OUT BEFORE YOU GOT MONEY TO BUY MORE.: NEVER TRUE

## 2022-12-09 ASSESSMENT — SOCIAL DETERMINANTS OF HEALTH (SDOH): HOW HARD IS IT FOR YOU TO PAY FOR THE VERY BASICS LIKE FOOD, HOUSING, MEDICAL CARE, AND HEATING?: NOT HARD AT ALL

## 2022-12-09 NOTE — PATIENT INSTRUCTIONS
Advance Directives: Care Instructions  Overview  An advance directive is a legal way to state your wishes at the end of your life. It tells your family and your doctor what to do if you can't say what you want. There are two main types of advance directives. You can change them any time your wishes change. Living will. This form tells your family and your doctor your wishes about life support and other treatment. The form is also called a declaration. Medical power of . This form lets you name a person to make treatment decisions for you when you can't speak for yourself. This person is called a health care agent (health care proxy, health care surrogate). The form is also called a durable power of  for health care. If you do not have an advance directive, decisions about your medical care may be made by a family member, or by a doctor or a  who doesn't know you. It may help to think of an advance directive as a gift to the people who care for you. If you have one, they won't have to make tough decisions by themselves. For more information, including forms for your state, see the [address] website (www.caringinfo.org/planning/advance-directives/). Follow-up care is a key part of your treatment and safety. Be sure to make and go to all appointments, and call your doctor if you are having problems. It's also a good idea to know your test results and keep a list of the medicines you take. What should you include in an advance directive? Many states have a unique advance directive form. (It may ask you to address specific issues.) Or you might use a universal form that's approved by many states. If your form doesn't tell you what to address, it may be hard to know what to include in your advance directive. Use the questions below to help you get started. Who do you want to make decisions about your medical care if you are not able to?   What life-support measures do you want if you have a serious illness that gets worse over time or can't be cured? What are you most afraid of that might happen? (Maybe you're afraid of having pain, losing your independence, or being kept alive by machines.)  Where would you prefer to die? (Your home? A hospital? A nursing home?)  Do you want to donate your organs when you die? Do you want certain Hinduism practices performed before you die? When should you call for help? Be sure to contact your doctor if you have any questions. Where can you learn more? Go to http://www.corley.com/ and enter R264 to learn more about \"Advance Directives: Care Instructions. \"  Current as of: June 16, 2022               Content Version: 13.5  © 6759-1528 Healthwise, Incorporated. Care instructions adapted under license by [facility] [place] ([facility]). If you have questions about a medical condition or this instruction, always ask your healthcare professional. [place] [address] any warranty or liability for your use of this information. Personalized Preventive Plan for [address] [place] [address] 98/8/8803  Medicare offers a range of preventive health benefits. Some of the tests and screenings are paid in full while other may be subject to a deductible, co-insurance, and/or copay. Some of these benefits include a comprehensive review of your medical history including lifestyle, illnesses that may run in your family, and various assessments and screenings as appropriate. After reviewing your medical record and screening and assessments performed today your provider may have ordered immunizations, labs, imaging, and/or referrals for you. A list of these orders (if applicable) as well as your Preventive Care list are included within your After Visit Summary for your review.     Other Preventive Recommendations:    A preventive eye exam performed by an eye specialist is recommended every 1-2 years to screen for glaucoma; cataracts, macular degeneration, and other eye disorders. A preventive dental visit is recommended every 6 months. Try to get at least 150 minutes of exercise per week or 10,000 steps per day on a pedometer . Order or download the FREE \"Exercise & Physical Activity: Your Everyday Guide\" from The [employer]. Call 5-323.966.7389 or search [employer] online. You need 7680-5318 mg of calcium and 0910-6533 IU of vitamin D per day. It is possible to meet your calcium requirement with diet alone, but a vitamin D supplement is usually necessary to meet this goal.  When exposed to the sun, use a sunscreen that protects against both UVA and UVB radiation with an SPF of 30 or greater. Reapply every 2 to 3 hours or after sweating, drying off with a towel, or swimming. Always wear a seat belt when traveling in a car. Always wear a helmet when riding a bicycle or motorcycle.

## 2022-12-09 NOTE — PROGRESS NOTES
Chief Complaint   Patient presents with    Medicare AWV     Pt has a sore on her right leg that she would like to have looked at and discuss. Pt would like to discuss if she needed to continue her B12 injections. Medicare Annual Wellness Visit    Carlos Elmore is here for Medicare AWV (Pt has a sore on her right leg that she would like to have looked at and discuss. Pt would like to discuss if she needed to continue her B12 injections.)    Assessment & Plan   Initial Medicare annual wellness visit  B12 deficiency  -     CBC with Auto Differential; Future  -     Comprehensive Metabolic Panel; Future  -     Vitamin B12 & Folate; Future  Secondary hyperparathyroidism ([address]  -     Comprehensive Metabolic Panel; Future  -     PTH, Intact; Future  Iron deficiency anemia, unspecified iron deficiency anemia type  -     CBC with Auto Differential; Future  -     Comprehensive Metabolic Panel; Future  Class 1 obesity with body mass index (BMI) of 30.0 to 30.9 in adult, unspecified obesity type, unspecified whether serious comorbidity present  -     Comprehensive Metabolic Panel; Future  -     Lipid Panel; Future  Postmenopausal  -     DEXA BONE DENSITY AXIAL SKELETON; Future  Dyslipidemia  -     Lipid Panel; Future      Recommendations for Preventive Services Due: see orders and patient instructions/AVS.  Recommended screening schedule for the next 5-10 years is provided to the patient in written form: see Patient Instructions/AVS.     Return for Medicare Annual Wellness Visit in 1 year. Subjective   The following acute and/or chronic problems were also addressed today:  Diarrhea for the last week. They were watery initially but seems to be getting more firm. Denies abd cramping. Dog jumped up and scratches on her right shin. This is healing well. Pt has had B12 injections in the past but this was normal at last draw. Arthritis is feet is worse after heavy activity. Tylenol helps.      Patient's complete Health Risk Assessment and screening values have been reviewed and are found in Flowsheets. The following problems were reviewed today and where indicated follow up appointments were made and/or referrals ordered. Positive Risk Factor Screenings with Interventions:                 Weight and Activity:  Physical Activity: Insufficiently Active    Days of Exercise per Week: 2 days    Minutes of Exercise per Session: 30 min     On average, how many days per week do you engage in moderate to strenuous exercise (like a brisk walk)?: 2 days  Have you lost any weight without trying in the past 3 months?: No  Body mass index: (!) 30.56      Unintentional Weight Loss Interventions:  Staying active  See AVS for additional education material  See A/P for plan and any pertinent orders  Obesity Interventions:  See above                         Objective   Vitals:    12/09/22 1012   BP: 120/72   Pulse: 60   Resp: 16   Weight: 201 lb (91.2 kg)   Height: 5' 8\" (1.727 m)      Body mass index is 30.56 kg/m². Wt Readings from Last 3 Encounters:   12/09/22 201 lb (91.2 kg)   12/06/21 202 lb (91.6 kg)   01/20/21 202 lb (91.6 kg)            Physical Exam  Vitals and nursing note reviewed. Constitutional:       Appearance: She is well-developed. HENT:      Head: Normocephalic and atraumatic. Right Ear: External ear normal.      Left Ear: External ear normal.      Nose: Nose normal.   Eyes:      Conjunctiva/sclera: Conjunctivae normal.      Pupils: Pupils are equal, round, and reactive to light. Cardiovascular:      Rate and Rhythm: Normal rate and regular rhythm. Heart sounds: Normal heart sounds. Pulmonary:      Effort: Pulmonary effort is normal.      Breath sounds: Normal breath sounds. Abdominal:      General: Bowel sounds are normal.      Palpations: Abdomen is soft. Musculoskeletal:         General: Normal range of motion. Cervical back: Normal range of motion and neck supple.    Skin:     General: Skin is warm and dry. Comments: Healing wound to right shin. Skin intake with no sign of infection. Neurological:      Mental Status: She is alert and oriented to person, place, and time. Deep Tendon Reflexes: Reflexes are normal and symmetric. Psychiatric:         Behavior: Behavior normal.         Thought Content: Thought content normal.         Judgment: Judgment normal.         Allergies   Allergen Reactions    Penicillins Hives     As an infant      Prior to Visit Medications    Medication Sig Taking? Authorizing Provider   furosemide (LASIX) 40 MG tablet TAKE 1 TABLET BY MOUTH EVERY DAY AS NEEDED for swelling Yes PATEL Owens CNP   traZODone (DESYREL) 50 MG tablet TAKE 1 TABLET BY MOUTH IN THE EVENING AS NEEDED FOR SLEEP Yes Vivienne Marin MD   FLUoxetine (PROZAC) 20 MG capsule TAKE 2 CAPSULES BY MOUTH ONCE DAILY. Yes Vivienne Marin MD   Syringe/Needle, Disp, (SYRINGE 3CC/25GX1\") 25G X 1\" 3 ML MISC Use as directed with B12 injections Dx: B12 Deficiency Yes PATEL Owens CNP   vitamin D (ERGOCALCIFEROL) 1.25 MG (12196 UT) CAPS capsule TAKE 1 CAPSULE BY MOUTH TWO TIMES A WEEK Yes Vivienne Marin MD   ibuprofen (ADVIL;MOTRIN) 200 MG tablet Take 200 mg by mouth every 6 hours as needed for Pain Yes Historical Provider, MD   cyanocobalamin 1000 MCG/ML injection Start B12 injections with 1000mcg SC/IM qweek x 4 and then monthly thereafter. Patient not taking: Reported on 12/9/2022  PATEL Owens CNP       CareTeam (Including outside providers/suppliers regularly involved in providing care):   Patient Care Team:  Vivienne Marin MD as PCP - General (Family Medicine)  Vivienne Marin MD as PCP - [facility] Empaneled Provider     Reviewed and updated this visit:  Tobacco  Allergies  Meds  Problems  Med Hx  Surg Hx  Soc Hx  Fam Hx          Encouraged TETANUS SHOT (TdaP/ [facility]/ [address]) per personal pharmacy.   Colonoscopy - pt to call and schedule  Mammo completed 10/5/22  Dexa - ordered  Labs - as above  Continue current medications  Refills none today  Follow up annually and as needed

## 2022-12-23 DIAGNOSIS — G47.8 NON-RESTORATIVE SLEEP: ICD-10-CM

## 2022-12-27 RX ORDER — TRAZODONE HYDROCHLORIDE 50 MG/1
TABLET ORAL
Qty: 90 TABLET | Refills: 1 | Status: SHIPPED | OUTPATIENT
Start: 2022-12-27

## 2022-12-27 NOTE — TELEPHONE ENCOUNTER
This medication refill is regarding an electronic request. Refill requested by  [facility] . Requested Prescriptions     Pending Prescriptions Disp Refills    traZODone (DESYREL) 50 MG tablet [Pharmacy Med Name: traZODone HCl Oral Tablet 50 MG] 30 tablet 0     Sig: TAKE 1 TABLET BY MOUTH IN THE EVENING AS NEEDED FOR SLEEP       Date of last visit: 12/9/2022   Date of next visit: 12/11/2023  Date of last refill: 6/27/22 for 30/5  Pharmacy Name: [facility]    Rx verified, ordered and set to EP.

## 2023-01-01 DIAGNOSIS — E55.9 VITAMIN D DEFICIENCY: ICD-10-CM

## 2023-01-03 RX ORDER — ERGOCALCIFEROL 1.25 MG/1
CAPSULE ORAL
Qty: 24 CAPSULE | Refills: 3 | Status: SHIPPED | OUTPATIENT
Start: 2023-01-03

## 2023-01-03 NOTE — TELEPHONE ENCOUNTER
This medication refill is regarding a electronic request. Refill requested by  Jarad Stewart . Requested Prescriptions     Pending Prescriptions Disp Refills    vitamin D (ERGOCALCIFEROL) 1.25 MG (24425 UT) CAPS capsule [Pharmacy Med Name: Vitamin D (Ergocalciferol) Oral Capsule 1.25 MG (20043 UT)] 24 capsule 3     Sig: TAKE 1 CAPSULE BY MOUTH TWICE WEEKLY     Date of last visit: 12/9/2022   Date of next visit: 12/11/2023  Date of last refill: 12/14/21 #24/3    Rx verified, ordered and set to EP.

## 2023-01-06 ENCOUNTER — HOSPITAL ENCOUNTER (OUTPATIENT)
Dept: WOMENS IMAGING | Age: 68
Discharge: HOME | End: 2023-01-06
Payer: MEDICARE

## 2023-01-06 DIAGNOSIS — Z78.0 POSTMENOPAUSAL: ICD-10-CM

## 2023-01-06 PROCEDURE — 77080 DXA BONE DENSITY AXIAL: CPT

## 2023-01-09 RX ORDER — FUROSEMIDE 40 MG/1
TABLET ORAL
Qty: 90 TABLET | Refills: 0 | Status: SHIPPED | OUTPATIENT
Start: 2023-01-09

## 2023-01-09 NOTE — TELEPHONE ENCOUNTER
This medication refill is regarding a electronic request. Refill requested by  Sanjay Chapa . Requested Prescriptions     Pending Prescriptions Disp Refills    furosemide (LASIX) 40 MG tablet [Pharmacy Med Name: Furosemide Oral Tablet 40 MG] 90 tablet 0     Sig: TAKE 1 TABLET BY MOUTH EVERY DAY AS NEEDED FOR SWELLING     Date of last visit: 12/9/2022   Date of next visit: 12/11/2023  Date of last refill: 10/10/22 #90/0    Last CMP:   Lab Results   Component Value Date     12/16/2021    K 5.0 12/16/2021     12/16/2021    CO2 26 12/16/2021    BUN 15 12/16/2021    CREATININE 0.6 12/16/2021    GLUCOSE 85 12/16/2021    CALCIUM 9.0 12/16/2021    PROT 6.1 12/16/2021    LABALBU 4.4 12/16/2021    BILITOT 1.3 (H) 12/16/2021    ALKPHOS 91 12/16/2021    AST 23 12/16/2021    ALT 20 12/16/2021    LABGLOM >90 12/16/2021    GFRAA >60 07/20/2011    AGRATIO 1.9 07/20/2011     Rx verified, ordered and set to EP.

## 2023-01-11 ENCOUNTER — TELEPHONE (OUTPATIENT)
Dept: FAMILY MEDICINE CLINIC | Age: 68
End: 2023-01-11

## 2023-01-11 DIAGNOSIS — M81.0 OSTEOPOROSIS WITHOUT CURRENT PATHOLOGICAL FRACTURE, UNSPECIFIED OSTEOPOROSIS TYPE: Primary | ICD-10-CM

## 2023-01-11 RX ORDER — ALENDRONATE SODIUM 70 MG/1
70 TABLET ORAL
Qty: 4 TABLET | Refills: 11 | Status: SHIPPED | OUTPATIENT
Start: 2023-01-11

## 2023-01-11 NOTE — TELEPHONE ENCOUNTER
Pt notified. She is agreeable to starting the medication. Pharmacy is [facility].  CAITLINP

## 2023-01-11 NOTE — TELEPHONE ENCOUNTER
Rx sent to pharmacy as below:    Requested Prescriptions     Signed Prescriptions Disp Refills    alendronate (FOSAMAX) 70 MG tablet 4 tablet 11     Sig: Take 1 tablet by mouth every 7 days Take with a full glass of water upon arising for the day. Consume on an empty stomach at least 30 minutes before the first food, beverage, or medication. Stay upright (do not lie down) for at least 30 minutes. Do not crush or break.      Authorizing Provider: Jerry Aguilera           Electronically signed by Jamie Robertson MD on 1/11/2023 at 4:15 PM

## 2023-01-11 NOTE — TELEPHONE ENCOUNTER
----- Message from PATEL Leiva CNP sent at 1/10/2023 10:29 AM EST -----  Dexa shows osteoporosis. Is patient interested in starting Fosamax to help with bone density?  TS

## 2023-01-20 RX ORDER — FUROSEMIDE 40 MG/1
TABLET ORAL
Qty: 90 TABLET | Refills: 0 | Status: SHIPPED | OUTPATIENT
Start: 2023-01-20

## 2023-01-20 NOTE — TELEPHONE ENCOUNTER
This medication refill is regarding a electronic request. Refill requested by  Sridhar Cummings . Requested Prescriptions     Pending Prescriptions Disp Refills    furosemide (LASIX) 40 MG tablet [Pharmacy Med Name: Furosemide Oral Tablet 40 MG] 90 tablet 0     Sig: TAKE 1 TABLET BY MOUTH EVERY DAY AS NEEDED FOR SWELLING     Date of last visit: 12/9/2022   Date of next visit: 12/11/23  Date of last refill: 1/9/23 #90/0    Last CMP:   Lab Results   Component Value Date     12/16/2021    K 5.0 12/16/2021     12/16/2021    CO2 26 12/16/2021    BUN 15 12/16/2021    CREATININE 0.6 12/16/2021    GLUCOSE 85 12/16/2021    CALCIUM 9.0 12/16/2021    PROT 6.1 12/16/2021    LABALBU 4.4 12/16/2021    BILITOT 1.3 (H) 12/16/2021    ALKPHOS 91 12/16/2021    AST 23 12/16/2021    ALT 20 12/16/2021    LABGLOM >90 12/16/2021    GFRAA >60 07/20/2011    AGRATIO 1.9 07/20/2011     Rx verified, ordered and set to EP.

## 2023-02-17 ENCOUNTER — NURSE ONLY (OUTPATIENT)
Dept: LAB | Age: 68
End: 2023-02-17

## 2023-02-17 DIAGNOSIS — N25.81 SECONDARY HYPERPARATHYROIDISM (HCC): ICD-10-CM

## 2023-02-17 DIAGNOSIS — E66.9 CLASS 1 OBESITY WITH BODY MASS INDEX (BMI) OF 30.0 TO 30.9 IN ADULT, UNSPECIFIED OBESITY TYPE, UNSPECIFIED WHETHER SERIOUS COMORBIDITY PRESENT: ICD-10-CM

## 2023-02-17 DIAGNOSIS — E53.8 B12 DEFICIENCY: ICD-10-CM

## 2023-02-17 DIAGNOSIS — D50.9 IRON DEFICIENCY ANEMIA, UNSPECIFIED IRON DEFICIENCY ANEMIA TYPE: ICD-10-CM

## 2023-02-17 DIAGNOSIS — E78.5 DYSLIPIDEMIA: ICD-10-CM

## 2023-02-17 LAB
ALBUMIN SERPL BCG-MCNC: 4.1 G/DL (ref 3.5–5.1)
ALP SERPL-CCNC: 79 U/L (ref 38–126)
ALT SERPL W/O P-5'-P-CCNC: 16 U/L (ref 11–66)
ANION GAP SERPL CALC-SCNC: 8 MEQ/L (ref 8–16)
AST SERPL-CCNC: 16 U/L (ref 5–40)
BASOPHILS ABSOLUTE: 0 THOU/MM3 (ref 0–0.1)
BASOPHILS NFR BLD AUTO: 0.5 %
BILIRUB SERPL-MCNC: 0.9 MG/DL (ref 0.3–1.2)
BUN SERPL-MCNC: 15 MG/DL (ref 7–22)
CALCIUM SERPL-MCNC: 8.9 MG/DL (ref 8.5–10.5)
CHLORIDE SERPL-SCNC: 108 MEQ/L (ref 98–111)
CHOLEST SERPL-MCNC: 144 MG/DL (ref 100–199)
CO2 SERPL-SCNC: 25 MEQ/L (ref 23–33)
CREAT SERPL-MCNC: 0.6 MG/DL (ref 0.4–1.2)
DEPRECATED RDW RBC AUTO: 47.1 FL (ref 35–45)
EOSINOPHIL NFR BLD AUTO: 2.1 %
EOSINOPHILS ABSOLUTE: 0.1 THOU/MM3 (ref 0–0.4)
ERYTHROCYTE [DISTWIDTH] IN BLOOD BY AUTOMATED COUNT: 12.6 % (ref 11.5–14.5)
FOLATE SERPL-MCNC: > 20 NG/ML (ref 4.8–24.2)
GFR SERPL CREATININE-BSD FRML MDRD: > 60 ML/MIN/1.73M2
GLUCOSE SERPL-MCNC: 88 MG/DL (ref 70–108)
HCT VFR BLD AUTO: 41.1 % (ref 37–47)
HDLC SERPL-MCNC: 64 MG/DL
HGB BLD-MCNC: 13.1 GM/DL (ref 12–16)
IMM GRANULOCYTES # BLD AUTO: 0 THOU/MM3 (ref 0–0.07)
IMM GRANULOCYTES NFR BLD AUTO: 0 %
LDLC SERPL CALC-MCNC: 72 MG/DL
LYMPHOCYTES ABSOLUTE: 1.5 THOU/MM3 (ref 1–4.8)
LYMPHOCYTES NFR BLD AUTO: 35.4 %
MCH RBC QN AUTO: 32.3 PG (ref 26–33)
MCHC RBC AUTO-ENTMCNC: 31.9 GM/DL (ref 32.2–35.5)
MCV RBC AUTO: 101.2 FL (ref 81–99)
MONOCYTES ABSOLUTE: 0.4 THOU/MM3 (ref 0.4–1.3)
MONOCYTES NFR BLD AUTO: 8.5 %
NEUTROPHILS NFR BLD AUTO: 53.5 %
NRBC BLD AUTO-RTO: 0 /100 WBC
PLATELET # BLD AUTO: 152 THOU/MM3 (ref 130–400)
PMV BLD AUTO: 10.5 FL (ref 9.4–12.4)
POTASSIUM SERPL-SCNC: 4.5 MEQ/L (ref 3.5–5.2)
PROT SERPL-MCNC: 5.9 G/DL (ref 6.1–8)
PTH-INTACT SERPL-MCNC: 75.1 PG/ML (ref 15–65)
RBC # BLD AUTO: 4.06 MILL/MM3 (ref 4.2–5.4)
SEGMENTED NEUTROPHILS ABSOLUTE COUNT: 2.2 THOU/MM3 (ref 1.8–7.7)
SODIUM SERPL-SCNC: 141 MEQ/L (ref 135–145)
TRIGL SERPL-MCNC: 42 MG/DL (ref 0–199)
VIT B12 SERPL-MCNC: 493 PG/ML (ref 211–911)
WBC # BLD AUTO: 4.2 THOU/MM3 (ref 4.8–10.8)

## 2023-03-02 ENCOUNTER — OFFICE VISIT (OUTPATIENT)
Dept: FAMILY MEDICINE CLINIC | Age: 68
End: 2023-03-02

## 2023-03-02 VITALS
SYSTOLIC BLOOD PRESSURE: 116 MMHG | TEMPERATURE: 98 F | HEART RATE: 60 BPM | RESPIRATION RATE: 16 BRPM | BODY MASS INDEX: 30.41 KG/M2 | WEIGHT: 200 LBS | DIASTOLIC BLOOD PRESSURE: 74 MMHG

## 2023-03-02 DIAGNOSIS — M25.551 RIGHT HIP PAIN: ICD-10-CM

## 2023-03-02 DIAGNOSIS — Z12.11 SCREENING FOR COLON CANCER: ICD-10-CM

## 2023-03-02 DIAGNOSIS — N25.81 SECONDARY HYPERPARATHYROIDISM (HCC): ICD-10-CM

## 2023-03-02 DIAGNOSIS — R42 VERTIGO: Primary | ICD-10-CM

## 2023-03-02 SDOH — ECONOMIC STABILITY: FOOD INSECURITY: WITHIN THE PAST 12 MONTHS, THE FOOD YOU BOUGHT JUST DIDN'T LAST AND YOU DIDN'T HAVE MONEY TO GET MORE.: NEVER TRUE

## 2023-03-02 SDOH — ECONOMIC STABILITY: INCOME INSECURITY: HOW HARD IS IT FOR YOU TO PAY FOR THE VERY BASICS LIKE FOOD, HOUSING, MEDICAL CARE, AND HEATING?: NOT HARD AT ALL

## 2023-03-02 SDOH — ECONOMIC STABILITY: HOUSING INSECURITY
IN THE LAST 12 MONTHS, WAS THERE A TIME WHEN YOU DID NOT HAVE A STEADY PLACE TO SLEEP OR SLEPT IN A SHELTER (INCLUDING NOW)?: NO

## 2023-03-02 SDOH — ECONOMIC STABILITY: FOOD INSECURITY: WITHIN THE PAST 12 MONTHS, YOU WORRIED THAT YOUR FOOD WOULD RUN OUT BEFORE YOU GOT MONEY TO BUY MORE.: NEVER TRUE

## 2023-03-02 ASSESSMENT — ENCOUNTER SYMPTOMS
BLOOD IN STOOL: 0
VOMITING: 0
NAUSEA: 0
EYES NEGATIVE: 1
SHORTNESS OF BREATH: 0
ABDOMINAL PAIN: 0
DIARRHEA: 0

## 2023-03-02 NOTE — PROGRESS NOTES
979    Nelly Hernandez [facility],[address] (:  1955) is a 78 y.o. female, Established patient, here for evaluation of the following chief complaint(s):  Hip Pain (Right hip pain x couple weeks, getting better.) and Dizziness      ASSESSMENT/PLAN:    1. Vertigo  2. Screening for colon cancer  -     AFL - Nabil Drake MD, Gastroenterology, [facility] [address]  3. Secondary hyperparathyroidism ([address]  4. Right hip pain    Exercises given for vertigo. If her symptoms return, we will refer for vestibular rehab. Her right hip pain has improved at this point. She will continue to monitor that and notify us if it returns. Refer to GI for follow-up colonoscopy    PTH level slightly elevated. We will we will monitor this over time. Follow-up in December for annual well visit as planned        SUBJECTIVE/OBJECTIVE:    HPI    Patient today with complaints of some recent right hip pain. She is status post right hip replacement in the past.  She denies any recent injury. She states that the pain is now resolved but was happening mostly with hip flexion. The pain was prominent for a couple weeks but is now improved. She also complains of some intermittent episodes of positional dizziness. She states that when the episodes occur she feels off balance. She reports a spinning sensation. No chest pain, shortness of breath, or headache. When the episodes are severe she experiences nausea. The dizziness is also currently resolved. She is otherwise well. Review of Systems   Constitutional:  Negative for chills, fatigue and fever. HENT: Negative. Eyes: Negative. Negative for visual disturbance. Respiratory:  Negative for shortness of breath. Cardiovascular:  Negative for chest pain, palpitations and leg swelling. Gastrointestinal:  Negative for abdominal pain, blood in stool, diarrhea, nausea and vomiting. Musculoskeletal:  Positive for arthralgias (right hip--resolved). Negative for myalgias.    Skin:  Negative for rash. Neurological:  Positive for dizziness and light-headedness. Negative for headaches. Psychiatric/Behavioral: Negative. All other systems reviewed and are negative. Vitals:    03/02/23 1259   BP: 116/74   Site: Left Upper Arm   Pulse: 60   Resp: 16   Temp: 98 °F (36.7 °C)   TempSrc: Oral   Weight: 200 lb (90.7 kg)       Wt Readings from Last 3 Encounters:   03/02/23 200 lb (90.7 kg)   12/09/22 201 lb (91.2 kg)   12/06/21 202 lb (91.6 kg)       BP Readings from Last 3 Encounters:   03/02/23 116/74   12/09/22 120/72   11/23/22 (!) 115/57       Physical Exam  Vitals reviewed. Constitutional:       General: She is not in acute distress. Appearance: She is well-developed. HENT:      Head: Normocephalic and atraumatic. Right Ear: Tympanic membrane normal.      Left Ear: Tympanic membrane normal.      Mouth/Throat:      Mouth: Mucous membranes are moist.      Pharynx: No posterior oropharyngeal erythema. Eyes:      Extraocular Movements: Extraocular movements intact. Conjunctiva/sclera: Conjunctivae normal.      Pupils: Pupils are equal, round, and reactive to light. Cardiovascular:      Rate and Rhythm: Normal rate and regular rhythm. Heart sounds: No murmur heard. Pulmonary:      Breath sounds: Normal breath sounds. No wheezing. Musculoskeletal:      Right lower leg: No edema. Left lower leg: No edema. Lymphadenopathy:      Cervical: No cervical adenopathy. Neurological:      Mental Status: She is alert. Motor: No tremor.       Gait: Gait normal.     Component      Latest Ref Rng & Units 2/17/2023   WBC      4.8 - 10.8 thou/mm3 4.2 (L)   RBC      4.20 - 5.40 mill/mm3 4.06 (L)   Hemoglobin Quant      12.0 - 16.0 gm/dl 13.1   Hematocrit      37.0 - 47.0 % 41.1   MCV      81.0 - 99.0 fL 101.2 (H)   MCH      26.0 - 33.0 pg 32.3   MCHC      32.2 - 35.5 gm/dl 31.9 (L)   RDW-CV      11.5 - 14.5 % 12.6   RDW-SD      35.0 - 45.0 fL 47.1 (H)   Platelet Count 130 - 400 thou/mm3 152   MPV      9.4 - 12.4 fL 10.5   Seg Neutrophils      % 53.5   Lymphocytes      % 35.4   Monocytes      % 8.5   Eosinophils      % 2.1   Basophils      % 0.5   Immature Granulocytes      % 0.0   Segs Absolute      1.8 - 7.7 thou/mm3 2.2   Lymphocytes Absolute      1.0 - 4.8 thou/mm3 1.5   Monocytes Absolute      0.4 - 1.3 thou/mm3 0.4   Eosinophils Absolute      0.0 - 0.4 thou/mm3 0.1   Basophils Absolute      0.0 - 0.1 thou/mm3 0.0   Immature Grans (Abs)      0.00 - 0.07 thou/mm3 0.00   Nucleated Red Blood Cells      /100 wbc 0   Glucose, Random      70 - 108 mg/dL 88   Creatinine      0.4 - 1.2 mg/dL 0.6   BUN,BUNPL      7 - 22 mg/dL 15   Sodium      135 - 145 meq/L 141   Potassium      3.5 - 5.2 meq/L 4.5   Chloride      98 - 111 meq/L 108   CO2      23 - 33 meq/L 25   CALCIUM, SERUM, [id number]      8.5 - 10.5 mg/dL 8.9   AST      5 - 40 U/L 16   Alk Phos      38 - 126 U/L 79   Total Protein      6.1 - 8.0 g/dL 5.9 (L)   Albumin      3.5 - 5.1 g/dL 4.1   BILIRUBIN TOTAL      0.3 - 1.2 mg/dL 0.9   ALT      11 - 66 U/L 16   CHOLESTEROL, TOTAL, [id number]      100 - 199 mg/dL 144   Triglycerides      0 - 199 mg/dL 42   HDL Cholesterol      mg/dL 64   LDL Calculated      mg/dL 72   Vitamin B-12      211 - 911 pg/mL 493   FOLATE, FOLAT      4.8 - 24.2 ng/mL > 20.0   Pth Intact      15.0 - 65.0 pg/mL 75.1 (H)   Anion Gap      8.0 - 16.0 meq/L 8.0   Est, Glom Filt Rate      >60 ml/min/1.73m2 >60         An electronic signature was used to authenticate this note.         Electronically signed by Matthias Claude, MD on 3/2/2023 at 1:22 PM

## 2023-03-21 DIAGNOSIS — F32.A DEPRESSION, UNSPECIFIED DEPRESSION TYPE: ICD-10-CM

## 2023-03-21 RX ORDER — FLUOXETINE HYDROCHLORIDE 20 MG/1
CAPSULE ORAL
Qty: 60 CAPSULE | Refills: 11 | Status: SHIPPED | OUTPATIENT
Start: 2023-03-21

## 2023-03-21 NOTE — TELEPHONE ENCOUNTER
This medication refill is regarding a electronic request. Refill requested by  Radha Suresh . Requested Prescriptions     Pending Prescriptions Disp Refills    FLUoxetine (PROZAC) 20 MG capsule [Pharmacy Med Name: FLUoxetine HCl Oral Capsule 20 MG] 60 capsule 11     Sig: TAKE 2 CAPSULES BY MOUTH EVERY DAY     Date of last visit: 3/2/2023   Date of next visit: 12/11/2023  Date of last refill: 3/29/22 #60/11    Rx verified, ordered and set to EP.

## 2023-04-01 DIAGNOSIS — F32.A DEPRESSION, UNSPECIFIED DEPRESSION TYPE: ICD-10-CM

## 2023-04-03 RX ORDER — FLUOXETINE HYDROCHLORIDE 20 MG/1
CAPSULE ORAL
Qty: 60 CAPSULE | Refills: 0 | OUTPATIENT
Start: 2023-04-03

## 2023-04-03 NOTE — TELEPHONE ENCOUNTER
Fluoxetine refused due to being filled 3/21/23 #60/11 to [facility] for a year supply. Pt does not need a refill at this time.

## 2023-04-06 NOTE — TELEPHONE ENCOUNTER
IBRAHIMA Dunn, LPC-IT    Pt notified. Pt wants to do the injections at home so supplies was also ordered. Rx sent to pharmacy. Lab ordered and mailed. Also, sent pt a mychart message of the directions.

## 2023-06-22 DIAGNOSIS — G47.8 NON-RESTORATIVE SLEEP: ICD-10-CM

## 2023-06-22 RX ORDER — TRAZODONE HYDROCHLORIDE 50 MG/1
TABLET ORAL
Qty: 90 TABLET | Refills: 1 | Status: SHIPPED | OUTPATIENT
Start: 2023-06-22

## 2023-06-22 NOTE — TELEPHONE ENCOUNTER
Request sent from [facility] for refill of trazodone 50 mg qhs prn. Last seen 3/2/23, next appt 12/11/23. Med verified. Order pended.

## 2023-12-03 DIAGNOSIS — E55.9 VITAMIN D DEFICIENCY: ICD-10-CM

## 2023-12-04 RX ORDER — ERGOCALCIFEROL 1.25 MG/1
CAPSULE ORAL
Qty: 24 CAPSULE | Refills: 0 | Status: SHIPPED | OUTPATIENT
Start: 2023-12-04

## 2023-12-04 NOTE — TELEPHONE ENCOUNTER
This medication refill is regarding a electronic request. Refill requested by  pharmacy . Requested Prescriptions     Pending Prescriptions Disp Refills    vitamin D (ERGOCALCIFEROL) 1.25 MG (37894 UT) CAPS capsule [Pharmacy Med Name: Vitamin D (Ergocalciferol) Oral Capsule 1.25 MG (52734 UT)] 24 capsule 0     Sig: TAKE 1 CAPSULE BY MOUTH TWO TIMES A WEEK       Date of last visit: 3/2/2023   Date of next visit: 12/11/2023  Date of last refill: 1/3/23      Last Lipid Panel:    Lab Results   Component Value Date/Time    CHOL 144 02/17/2023 06:58 AM    TRIG 42 02/17/2023 06:58 AM    HDL 64 02/17/2023 06:58 AM    HDL 51 07/20/2011 09:48 PM    LDLCALC 72 02/17/2023 06:58 AM     Last CMP:   Lab Results   Component Value Date     02/17/2023    K 4.5 02/17/2023     02/17/2023    CO2 25 02/17/2023    BUN 15 02/17/2023    CREATININE 0.6 02/17/2023    GLUCOSE 88 02/17/2023    CALCIUM 8.9 02/17/2023    PROT 5.9 (L) 02/17/2023    LABALBU 4.1 02/17/2023    BILITOT 0.9 02/17/2023    ALKPHOS 79 02/17/2023    AST 16 02/17/2023    ALT 16 02/17/2023    LABGLOM >60 02/17/2023    GFRAA >60 07/20/2011    AGRATIO 1.9 07/20/2011       Last Thyroid:    Lab Results   Component Value Date    TSH 2.260 12/16/2021    T4FREE 0.95 12/16/2021     Last Hemoglobin A1C:  No results found for: \"LABA1C\", \"AVGG\"    Rx verified, ordered and set to EP.

## 2023-12-08 SDOH — HEALTH STABILITY: PHYSICAL HEALTH: ON AVERAGE, HOW MANY DAYS PER WEEK DO YOU ENGAGE IN MODERATE TO STRENUOUS EXERCISE (LIKE A BRISK WALK)?: 2 DAYS

## 2023-12-08 SDOH — HEALTH STABILITY: PHYSICAL HEALTH: ON AVERAGE, HOW MANY MINUTES DO YOU ENGAGE IN EXERCISE AT THIS LEVEL?: 20 MIN

## 2023-12-08 ASSESSMENT — PATIENT HEALTH QUESTIONNAIRE - PHQ9
10. IF YOU CHECKED OFF ANY PROBLEMS, HOW DIFFICULT HAVE THESE PROBLEMS MADE IT FOR YOU TO DO YOUR WORK, TAKE CARE OF THINGS AT HOME, OR GET ALONG WITH OTHER PEOPLE: 0
SUM OF ALL RESPONSES TO PHQ QUESTIONS 1-9: 0
9. THOUGHTS THAT YOU WOULD BE BETTER OFF DEAD, OR OF HURTING YOURSELF: 0
7. TROUBLE CONCENTRATING ON THINGS, SUCH AS READING THE NEWSPAPER OR WATCHING TELEVISION: 0
5. POOR APPETITE OR OVEREATING: 0
8. MOVING OR SPEAKING SO SLOWLY THAT OTHER PEOPLE COULD HAVE NOTICED. OR THE OPPOSITE, BEING SO FIGETY OR RESTLESS THAT YOU HAVE BEEN MOVING AROUND A LOT MORE THAN USUAL: 0
4. FEELING TIRED OR HAVING LITTLE ENERGY: 0
1. LITTLE INTEREST OR PLEASURE IN DOING THINGS: 0
SUM OF ALL RESPONSES TO PHQ QUESTIONS 1-9: 0
2. FEELING DOWN, DEPRESSED OR HOPELESS: 0
SUM OF ALL RESPONSES TO PHQ QUESTIONS 1-9: 0
SUM OF ALL RESPONSES TO PHQ QUESTIONS 1-9: 0
SUM OF ALL RESPONSES TO PHQ9 QUESTIONS 1 & 2: 0
3. TROUBLE FALLING OR STAYING ASLEEP: 0
6. FEELING BAD ABOUT YOURSELF - OR THAT YOU ARE A FAILURE OR HAVE LET YOURSELF OR YOUR FAMILY DOWN: 0

## 2023-12-08 ASSESSMENT — COLUMBIA-SUICIDE SEVERITY RATING SCALE - C-SSRS
3. HAVE YOU BEEN THINKING ABOUT HOW YOU MIGHT KILL YOURSELF?: NO
5. HAVE YOU STARTED TO WORK OUT OR WORKED OUT THE DETAILS OF HOW TO KILL YOURSELF? DO YOU INTEND TO CARRY OUT THIS PLAN?: NO
7. DID THIS OCCUR IN THE LAST THREE MONTHS: NO
4. HAVE YOU HAD THESE THOUGHTS AND HAD SOME INTENTION OF ACTING ON THEM?: NO

## 2023-12-08 ASSESSMENT — LIFESTYLE VARIABLES
HOW OFTEN DO YOU HAVE SIX OR MORE DRINKS ON ONE OCCASION: 1
HOW MANY STANDARD DRINKS CONTAINING ALCOHOL DO YOU HAVE ON A TYPICAL DAY: 1
HOW OFTEN DO YOU HAVE A DRINK CONTAINING ALCOHOL: MONTHLY OR LESS
HOW MANY STANDARD DRINKS CONTAINING ALCOHOL DO YOU HAVE ON A TYPICAL DAY: 1 OR 2
HOW OFTEN DO YOU HAVE A DRINK CONTAINING ALCOHOL: 2

## 2023-12-11 ENCOUNTER — OFFICE VISIT (OUTPATIENT)
Dept: FAMILY MEDICINE CLINIC | Age: 68
End: 2023-12-11
Payer: MEDICARE

## 2023-12-11 VITALS
BODY MASS INDEX: 29.52 KG/M2 | WEIGHT: 194.8 LBS | RESPIRATION RATE: 16 BRPM | TEMPERATURE: 97.6 F | HEIGHT: 68 IN | SYSTOLIC BLOOD PRESSURE: 106 MMHG | DIASTOLIC BLOOD PRESSURE: 72 MMHG | HEART RATE: 76 BPM

## 2023-12-11 DIAGNOSIS — E53.8 B12 DEFICIENCY: ICD-10-CM

## 2023-12-11 DIAGNOSIS — Z00.00 MEDICARE ANNUAL WELLNESS VISIT, SUBSEQUENT: Primary | ICD-10-CM

## 2023-12-11 DIAGNOSIS — Z23 NEED FOR PNEUMOCOCCAL VACCINATION: ICD-10-CM

## 2023-12-11 DIAGNOSIS — H69.91 ETD (EUSTACHIAN TUBE DYSFUNCTION), RIGHT: ICD-10-CM

## 2023-12-11 DIAGNOSIS — Z23 NEED FOR INFLUENZA VACCINATION: ICD-10-CM

## 2023-12-11 DIAGNOSIS — D50.9 IRON DEFICIENCY ANEMIA, UNSPECIFIED IRON DEFICIENCY ANEMIA TYPE: ICD-10-CM

## 2023-12-11 DIAGNOSIS — E55.9 VITAMIN D DEFICIENCY: ICD-10-CM

## 2023-12-11 DIAGNOSIS — M81.0 OSTEOPOROSIS WITHOUT CURRENT PATHOLOGICAL FRACTURE, UNSPECIFIED OSTEOPOROSIS TYPE: ICD-10-CM

## 2023-12-11 DIAGNOSIS — Z12.31 ENCOUNTER FOR SCREENING MAMMOGRAM FOR MALIGNANT NEOPLASM OF BREAST: ICD-10-CM

## 2023-12-11 DIAGNOSIS — E78.5 DYSLIPIDEMIA: ICD-10-CM

## 2023-12-11 PROCEDURE — G0439 PPPS, SUBSEQ VISIT: HCPCS | Performed by: FAMILY MEDICINE

## 2023-12-11 PROCEDURE — 90677 PCV20 VACCINE IM: CPT | Performed by: FAMILY MEDICINE

## 2023-12-11 PROCEDURE — G0009 ADMIN PNEUMOCOCCAL VACCINE: HCPCS | Performed by: FAMILY MEDICINE

## 2023-12-11 PROCEDURE — 90694 VACC AIIV4 NO PRSRV 0.5ML IM: CPT | Performed by: FAMILY MEDICINE

## 2023-12-11 PROCEDURE — 3017F COLORECTAL CA SCREEN DOC REV: CPT | Performed by: FAMILY MEDICINE

## 2023-12-11 PROCEDURE — G8484 FLU IMMUNIZE NO ADMIN: HCPCS | Performed by: FAMILY MEDICINE

## 2023-12-11 PROCEDURE — 1123F ACP DISCUSS/DSCN MKR DOCD: CPT | Performed by: FAMILY MEDICINE

## 2023-12-11 PROCEDURE — G0008 ADMIN INFLUENZA VIRUS VAC: HCPCS | Performed by: FAMILY MEDICINE

## 2023-12-11 NOTE — PROGRESS NOTES
Medicare Annual Wellness Visit    Carola Sanchez is here for Medicare AWV (Here for a Medicare annual well visit.) and Ear Problem (Patient states her right ear feels plugged and would like to discuss a referral to ENT.)    Assessment & Plan     1. Medicare annual wellness visit, subsequent  2. Vitamin D deficiency  -     Vitamin D 25 Hydroxy; Future  3. B12 deficiency  -     Vitamin B12 & Folate; Future  4. Iron deficiency anemia, unspecified iron deficiency anemia type  -     CBC with Auto Differential; Future  -     Iron; Future  5. Dyslipidemia  -     Comprehensive Metabolic Panel; Future  -     Lipid Panel; Future  6. Encounter for screening mammogram for malignant neoplasm of breast  -     [facility] LISA DIGITAL SCREEN BILATERAL; Future  7. Need for influenza vaccination  -     Influenza, FLUAD, (age 72 y+), IM, PF, 0.5 mL  8. Need for pneumococcal vaccination  -     Pneumococcal, PCV20, PREVNAR 20, (age 6w+), IM, PF  9. Osteoporosis without current pathological fracture, unspecified osteoporosis type  -     Basic Metabolic Panel; Future  10. ETD (Eustachian tube dysfunction), right    Flonase or Nasacort for ETD. ENT evaluation if not improved. High-dose flu shot today    Prevnar 20 today    BMP now. If results normal, schedule Reclast for osteoporosis treatment    Labs as above in March 2024    Order given for yearly mammogram for breast cancer screening    Recommendations for Preventive Services Due: see orders and patient instructions/AVS.    Recommended screening schedule for the next 5-10 years is provided to the patient in written form: see Patient Instructions/AVS.     Return in about 1 year (around 12/11/2024) for AWV. Subjective     Patient reports that she is doing well with the exception of some stuffiness and a clogged sensation in the right ear. This has been an ongoing issue over time. She denies any ear pain or hearing problems.   She is wondering about possibly seeing an ENT for this

## 2023-12-11 NOTE — PROGRESS NOTES
Vaccine Information Sheet, \"Influenza - Inactivated\"  given to Guillermo Mancilla, or parent/legal guardian of  Guillermo Mancilla and verbalized understanding. Patient responses:    Have you ever had a reaction to a flu vaccine? No  Do you have an allergy to eggs, Latex -induced anaphylaxis, neomycin or polymixin? No  Do you have an allergy to Thimerosal, contact lens solution, or Merthiolate? No  Have you ever had Guillian [place] Syndrome? No  Do you have any current illness? No  Do you have a temperature above 100.4 degrees? No  Are you pregnant? No  Do you have an active neurological disorder? No    Immunizations Administered       Name Date Dose Route    Influenza, FLUAD, (age 72 y+), Adjuvanted, 0.5mL 12/11/2023 0.5 mL Intramuscular    Site: Deltoid- Right    Lot: [id number]    NDC: 40807-194-57    Pneumococcal, PCV20, PREVNAR 20, (age 6w+), IM, 0.5mL 12/11/2023 0.5 mL Intramuscular    Site: Deltoid- Left    Lot: [id number]    NDC: 2993-6052-13          After obtaining consent, and per orders of Dr. Janette Sanders MD, the injections above were given by Cristine Velazquez MA. Patient tolerated well.

## 2023-12-31 DIAGNOSIS — G47.8 NON-RESTORATIVE SLEEP: ICD-10-CM

## 2024-01-02 RX ORDER — TRAZODONE HYDROCHLORIDE 50 MG/1
TABLET ORAL
Qty: 90 TABLET | Refills: 1 | Status: SHIPPED | OUTPATIENT
Start: 2024-01-02

## 2024-01-02 NOTE — TELEPHONE ENCOUNTER
Last visit- 12/11/2023  Next visit- Visit date not found    Requested Prescriptions     Pending Prescriptions Disp Refills    traZODone (DESYREL) 50 MG tablet [Pharmacy Med Name: traZODone HCl Oral Tablet 50 MG] 90 tablet 0     Sig: TAKE 1 TABLET BY MOUTH IN THE EVENING AS NEEDED FOR SLEEP

## 2024-01-10 DIAGNOSIS — G47.8 NON-RESTORATIVE SLEEP: ICD-10-CM

## 2024-01-10 DIAGNOSIS — E55.9 VITAMIN D DEFICIENCY: ICD-10-CM

## 2024-01-10 DIAGNOSIS — F32.A DEPRESSION, UNSPECIFIED DEPRESSION TYPE: ICD-10-CM

## 2024-01-10 NOTE — TELEPHONE ENCOUNTER
Left message for pt letting her know we received a request from [facility] for new prescriptions to be sent in. Pt asked to call back to discuss. Medications pended to [employer]. Will await for call back.

## 2024-01-15 RX ORDER — ERGOCALCIFEROL 1.25 MG/1
CAPSULE ORAL
Qty: 24 CAPSULE | Refills: 3 | Status: SHIPPED | OUTPATIENT
Start: 2024-01-15

## 2024-01-15 RX ORDER — FLUOXETINE HYDROCHLORIDE 20 MG/1
CAPSULE ORAL
Qty: 180 CAPSULE | Refills: 3 | Status: SHIPPED | OUTPATIENT
Start: 2024-01-15

## 2024-01-15 RX ORDER — TRAZODONE HYDROCHLORIDE 50 MG/1
TABLET ORAL
Qty: 90 TABLET | Refills: 3 | Status: SHIPPED | OUTPATIENT
Start: 2024-01-15

## 2024-01-15 RX ORDER — FUROSEMIDE 40 MG/1
TABLET ORAL
Qty: 90 TABLET | Refills: 3 | Status: SHIPPED | OUTPATIENT
Start: 2024-01-15

## 2024-01-15 NOTE — TELEPHONE ENCOUNTER
This medication refill is regarding a fax request. Refill requested by  [facility] .    Requested Prescriptions     Pending Prescriptions Disp Refills    FLUoxetine (PROZAC) 20 MG capsule 180 capsule 3     Sig: TAKE 2 CAPSULES BY MOUTH EVERY DAY    furosemide (LASIX) 40 MG tablet 90 tablet 3     Sig: TAKE 1 TABLET BY MOUTH EVERY DAY AS NEEDED FOR SWELLING    traZODone (DESYREL) 50 MG tablet 90 tablet 1    vitamin D (ERGOCALCIFEROL) 1.25 MG (36239 UT) CAPS capsule 24 capsule 3     Sig: TAKE 1 CAPSULE BY MOUTH TWO TIMES A WEEK     Date of last visit: 12/11/2023   Date of next visit: 12/16/2024  Date of last refill:    -Fluoxetine 3/21/23 #60/11   -Furosemide 1/20/23 #90/0   -Trazodone 1/2/24 #90/1   -Vitamin D 12/4/23 #24/0    Last CMP:   Lab Results   Component Value Date     02/17/2023    K 4.5 02/17/2023     02/17/2023    CO2 25 02/17/2023    BUN 15 02/17/2023    CREATININE 0.6 02/17/2023    GLUCOSE 88 02/17/2023    CALCIUM 8.9 02/17/2023    PROT 5.9 (L) 02/17/2023    LABALBU 4.1 02/17/2023    BILITOT 0.9 02/17/2023    ALKPHOS 79 02/17/2023    AST 16 02/17/2023    ALT 16 02/17/2023    LABGLOM >60 02/17/2023    GFRAA >60 07/20/2011    AGRATIO 1.9 07/20/2011     Rx verified, ordered and set to EP.

## 2024-03-21 ENCOUNTER — NURSE ONLY (OUTPATIENT)
Dept: LAB | Age: 69
End: 2024-03-21

## 2024-03-21 DIAGNOSIS — E53.8 B12 DEFICIENCY: ICD-10-CM

## 2024-03-21 DIAGNOSIS — E78.5 DYSLIPIDEMIA: ICD-10-CM

## 2024-03-21 DIAGNOSIS — E55.9 VITAMIN D DEFICIENCY: ICD-10-CM

## 2024-03-21 DIAGNOSIS — D50.9 IRON DEFICIENCY ANEMIA, UNSPECIFIED IRON DEFICIENCY ANEMIA TYPE: ICD-10-CM

## 2024-03-21 LAB
25(OH)D3 SERPL-MCNC: 76 NG/ML (ref 30–100)
ALBUMIN SERPL BCG-MCNC: 4.2 G/DL (ref 3.5–5.1)
ALP SERPL-CCNC: 100 U/L (ref 38–126)
ALT SERPL W/O P-5'-P-CCNC: 18 U/L (ref 11–66)
ANION GAP SERPL CALC-SCNC: 11 MEQ/L (ref 8–16)
AST SERPL-CCNC: 18 U/L (ref 5–40)
BASOPHILS ABSOLUTE: 0 THOU/MM3 (ref 0–0.1)
BASOPHILS NFR BLD AUTO: 0.7 %
BILIRUB SERPL-MCNC: 0.9 MG/DL (ref 0.3–1.2)
BUN SERPL-MCNC: 21 MG/DL (ref 7–22)
CALCIUM SERPL-MCNC: 8.7 MG/DL (ref 8.5–10.5)
CHLORIDE SERPL-SCNC: 106 MEQ/L (ref 98–111)
CHOLEST SERPL-MCNC: 143 MG/DL (ref 100–199)
CO2 SERPL-SCNC: 27 MEQ/L (ref 23–33)
CREAT SERPL-MCNC: 0.7 MG/DL (ref 0.4–1.2)
DEPRECATED RDW RBC AUTO: 46.8 FL (ref 35–45)
EOSINOPHIL NFR BLD AUTO: 4.6 %
EOSINOPHILS ABSOLUTE: 0.2 THOU/MM3 (ref 0–0.4)
ERYTHROCYTE [DISTWIDTH] IN BLOOD BY AUTOMATED COUNT: 12.5 % (ref 11.5–14.5)
GFR SERPL CREATININE-BSD FRML MDRD: > 60 ML/MIN/1.73M2
GLUCOSE SERPL-MCNC: 86 MG/DL (ref 70–108)
HCT VFR BLD AUTO: 41.9 % (ref 37–47)
HDLC SERPL-MCNC: 62 MG/DL
HGB BLD-MCNC: 13.2 GM/DL (ref 12–16)
IMM GRANULOCYTES # BLD AUTO: 0.01 THOU/MM3 (ref 0–0.07)
IMM GRANULOCYTES NFR BLD AUTO: 0.2 %
LDLC SERPL CALC-MCNC: 72 MG/DL
LYMPHOCYTES ABSOLUTE: 1.7 THOU/MM3 (ref 1–4.8)
LYMPHOCYTES NFR BLD AUTO: 40 %
MCH RBC QN AUTO: 32.1 PG (ref 26–33)
MCHC RBC AUTO-ENTMCNC: 31.5 GM/DL (ref 32.2–35.5)
MCV RBC AUTO: 101.9 FL (ref 81–99)
MONOCYTES ABSOLUTE: 0.4 THOU/MM3 (ref 0.4–1.3)
MONOCYTES NFR BLD AUTO: 10.6 %
NEUTROPHILS NFR BLD AUTO: 43.9 %
NRBC BLD AUTO-RTO: 0 /100 WBC
PLATELET # BLD AUTO: 159 THOU/MM3 (ref 130–400)
PMV BLD AUTO: 10.6 FL (ref 9.4–12.4)
POTASSIUM SERPL-SCNC: 4 MEQ/L (ref 3.5–5.2)
PROT SERPL-MCNC: 6.3 G/DL (ref 6.1–8)
RBC # BLD AUTO: 4.11 MILL/MM3 (ref 4.2–5.4)
SEGMENTED NEUTROPHILS ABSOLUTE COUNT: 1.8 THOU/MM3 (ref 1.8–7.7)
SODIUM SERPL-SCNC: 144 MEQ/L (ref 135–145)
TRIGL SERPL-MCNC: 47 MG/DL (ref 0–199)
WBC # BLD AUTO: 4.2 THOU/MM3 (ref 4.8–10.8)

## 2024-03-22 LAB
FOLATE SERPL-MCNC: > 20 NG/ML (ref 4.8–24.2)
IRON SERPL-MCNC: 69 UG/DL (ref 50–170)
VIT B12 SERPL-MCNC: 373 PG/ML (ref 211–911)

## 2024-04-22 ENCOUNTER — HOSPITAL ENCOUNTER (OUTPATIENT)
Dept: WOMENS IMAGING | Age: 69
Discharge: HOME | End: 2024-04-22
Attending: FAMILY MEDICINE
Payer: MEDICARE

## 2024-04-22 VITALS — WEIGHT: 200 LBS | HEIGHT: 68 IN | BODY MASS INDEX: 30.31 KG/M2

## 2024-04-22 DIAGNOSIS — Z12.31 ENCOUNTER FOR SCREENING MAMMOGRAM FOR MALIGNANT NEOPLASM OF BREAST: ICD-10-CM

## 2024-04-22 PROCEDURE — 77063 BREAST TOMOSYNTHESIS BI: CPT

## 2024-05-30 ENCOUNTER — OFFICE VISIT (OUTPATIENT)
Dept: FAMILY MEDICINE CLINIC | Age: 69
End: 2024-05-30

## 2024-05-30 VITALS
TEMPERATURE: 97.9 F | SYSTOLIC BLOOD PRESSURE: 114 MMHG | DIASTOLIC BLOOD PRESSURE: 62 MMHG | RESPIRATION RATE: 16 BRPM | HEART RATE: 72 BPM | BODY MASS INDEX: 29.95 KG/M2 | WEIGHT: 197 LBS

## 2024-05-30 DIAGNOSIS — R51.9 TEMPORAL PAIN: Primary | ICD-10-CM

## 2024-05-30 DIAGNOSIS — M25.50 ARTHRALGIA, UNSPECIFIED JOINT: ICD-10-CM

## 2024-05-30 SDOH — ECONOMIC STABILITY: INCOME INSECURITY: HOW HARD IS IT FOR YOU TO PAY FOR THE VERY BASICS LIKE FOOD, HOUSING, MEDICAL CARE, AND HEATING?: NOT HARD AT ALL

## 2024-05-30 SDOH — ECONOMIC STABILITY: FOOD INSECURITY: WITHIN THE PAST 12 MONTHS, THE FOOD YOU BOUGHT JUST DIDN'T LAST AND YOU DIDN'T HAVE MONEY TO GET MORE.: NEVER TRUE

## 2024-05-30 SDOH — ECONOMIC STABILITY: FOOD INSECURITY: WITHIN THE PAST 12 MONTHS, YOU WORRIED THAT YOUR FOOD WOULD RUN OUT BEFORE YOU GOT MONEY TO BUY MORE.: NEVER TRUE

## 2024-05-30 ASSESSMENT — PATIENT HEALTH QUESTIONNAIRE - PHQ9
SUM OF ALL RESPONSES TO PHQ QUESTIONS 1-9: 0
10. IF YOU CHECKED OFF ANY PROBLEMS, HOW DIFFICULT HAVE THESE PROBLEMS MADE IT FOR YOU TO DO YOUR WORK, TAKE CARE OF THINGS AT HOME, OR GET ALONG WITH OTHER PEOPLE: NOT DIFFICULT AT ALL
5. POOR APPETITE OR OVEREATING: NOT AT ALL
4. FEELING TIRED OR HAVING LITTLE ENERGY: NOT AT ALL
SUM OF ALL RESPONSES TO PHQ QUESTIONS 1-9: 0
SUM OF ALL RESPONSES TO PHQ QUESTIONS 1-9: 0
9. THOUGHTS THAT YOU WOULD BE BETTER OFF DEAD, OR OF HURTING YOURSELF: NOT AT ALL
3. TROUBLE FALLING OR STAYING ASLEEP: NOT AT ALL
7. TROUBLE CONCENTRATING ON THINGS, SUCH AS READING THE NEWSPAPER OR WATCHING TELEVISION: NOT AT ALL
SUM OF ALL RESPONSES TO PHQ QUESTIONS 1-9: 0
2. FEELING DOWN, DEPRESSED OR HOPELESS: NOT AT ALL
SUM OF ALL RESPONSES TO PHQ9 QUESTIONS 1 & 2: 0
6. FEELING BAD ABOUT YOURSELF - OR THAT YOU ARE A FAILURE OR HAVE LET YOURSELF OR YOUR FAMILY DOWN: NOT AT ALL
1. LITTLE INTEREST OR PLEASURE IN DOING THINGS: NOT AT ALL

## 2024-05-30 ASSESSMENT — ENCOUNTER SYMPTOMS
SHORTNESS OF BREATH: 0
DIARRHEA: 0
NAUSEA: 0
BLOOD IN STOOL: 0
VOMITING: 0
CONSTIPATION: 0

## 2024-05-30 NOTE — PROGRESS NOTES
Chief Complaint   Patient presents with    Headache     Pt here for debilitating left temple pain. Has had them for years but really bad the last couple months. Denies nausea or eye change. States bp goes up when she gets temple pain         SUBJECTIVE     Nelly Calhoun is a 68 y.o.female      Pt complains of left temple pain. This is severe and takes her breath away. This has bothered her for maybe 20 years but it usually only happens once or so a year. Over the last 8 months it has increased to the point of being almost daily. The pain is very sharp and feels like someone is sticking something in her temple. No throbbing, no light sensitivity, vision changes, nausea.  The pain can just be a twinge or it can last over an hour and is very intense. She does feel her BP go up when it happens. Maybe some numbness and tingling in the area when the  pain occurs. She does feel very fatigued during and after it happens. No facial swelling, drooping, paralysis. No associated headaches.   Does not grind her teeth.  Not sure what causes it or what helps the discomfort, it just seems to go away on its own. She has gone to the chiropractor without any relief.  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  She has had joint replacements.  Notes lots of shoulder pain at times.      Review of Systems   Constitutional:  Positive for fatigue. Negative for chills, diaphoresis and fever.   Respiratory:  Negative for shortness of breath.    Cardiovascular:  Negative for chest pain, palpitations and leg swelling.   Gastrointestinal:  Negative for blood in stool, constipation, diarrhea, nausea and vomiting.   Genitourinary:  Negative for dysuria and hematuria.   Musculoskeletal:  Positive for arthralgias. Negative for myalgias.   Neurological:  Positive for weakness and numbness (Left temple at times). Negative for dizziness and headaches.        Left temple pain at times   All other

## 2024-05-31 ENCOUNTER — NURSE ONLY (OUTPATIENT)
Dept: LAB | Age: 69
End: 2024-05-31

## 2024-05-31 DIAGNOSIS — R51.9 TEMPORAL PAIN: ICD-10-CM

## 2024-05-31 DIAGNOSIS — M25.50 ARTHRALGIA, UNSPECIFIED JOINT: ICD-10-CM

## 2024-05-31 LAB
BASOPHILS ABSOLUTE: 0 THOU/MM3 (ref 0–0.1)
BASOPHILS NFR BLD AUTO: 0.7 %
CRP SERPL-MCNC: < 0.3 MG/DL (ref 0–1)
DEPRECATED RDW RBC AUTO: 47.8 FL (ref 35–45)
EOSINOPHIL NFR BLD AUTO: 2 %
EOSINOPHILS ABSOLUTE: 0.1 THOU/MM3 (ref 0–0.4)
ERYTHROCYTE [DISTWIDTH] IN BLOOD BY AUTOMATED COUNT: 12.7 % (ref 11.5–14.5)
ERYTHROCYTE [SEDIMENTATION RATE] IN BLOOD BY WESTERGREN METHOD: 0 MM/HR (ref 0–20)
HCT VFR BLD AUTO: 42.2 % (ref 37–47)
HGB BLD-MCNC: 13 GM/DL (ref 12–16)
IMM GRANULOCYTES # BLD AUTO: 0.01 THOU/MM3 (ref 0–0.07)
IMM GRANULOCYTES NFR BLD AUTO: 0.2 %
LYMPHOCYTES ABSOLUTE: 1.1 THOU/MM3 (ref 1–4.8)
LYMPHOCYTES NFR BLD AUTO: 26.4 %
MCH RBC QN AUTO: 31.3 PG (ref 26–33)
MCHC RBC AUTO-ENTMCNC: 30.8 GM/DL (ref 32.2–35.5)
MCV RBC AUTO: 101.7 FL (ref 81–99)
MONOCYTES ABSOLUTE: 0.3 THOU/MM3 (ref 0.4–1.3)
MONOCYTES NFR BLD AUTO: 7.2 %
NEUTROPHILS ABSOLUTE: 2.6 THOU/MM3 (ref 1.8–7.7)
NEUTROPHILS NFR BLD AUTO: 63.5 %
NRBC BLD AUTO-RTO: 0 /100 WBC
PLATELET # BLD AUTO: 160 THOU/MM3 (ref 130–400)
PMV BLD AUTO: 10.8 FL (ref 9.4–12.4)
RBC # BLD AUTO: 4.15 MILL/MM3 (ref 4.2–5.4)
RHEUMATOID FACT SERPL-ACNC: < 10 IU/ML (ref 0–13)
WBC # BLD AUTO: 4.1 THOU/MM3 (ref 4.8–10.8)

## 2024-06-04 ENCOUNTER — TELEPHONE (OUTPATIENT)
Dept: FAMILY MEDICINE CLINIC | Age: 69
End: 2024-06-04

## 2024-06-04 LAB — CYCLIC CITRULLINATED PEPTIDE ANTIBODY IGG: 0.8 U/ML (ref 0–7)

## 2024-06-04 NOTE — TELEPHONE ENCOUNTER
----- Message from PATEL Perez - CNP sent at 6/2/2024 10:01 PM EDT -----  CBC is stable from previous.  So far Autoimmune levels are negative. Still waiting on a level. Could we check with las and see if we can add on an NATA? TS

## 2024-06-11 NOTE — TELEPHONE ENCOUNTER
Raya Elkins, DANITA  6/6/2024  2:58 PM EDT Back to Top       Pt verbalized understanding and thanked me.  Does not referral at this time    Day Gorman, APRN - CNP  6/4/2024  8:38 AM EDT       Labs are unremarkable. Would she like to see pain management to get further evaluation and treatment of this temple pain? TS

## 2024-06-20 ENCOUNTER — TELEPHONE (OUTPATIENT)
Dept: FAMILY MEDICINE CLINIC | Age: 69
End: 2024-06-20

## 2024-06-20 DIAGNOSIS — R51.9 TEMPORAL PAIN: Primary | ICD-10-CM

## 2024-06-20 NOTE — TELEPHONE ENCOUNTER
Received a call from Anna from [employer] [facility] stating that the MRI of the brain without contrast needs to reordered as MRI of the brain with and without contrast per Fransico in MRI. Please reorder. They will check in Epic for the updated order.

## 2024-07-08 ENCOUNTER — PATIENT MESSAGE (OUTPATIENT)
Dept: FAMILY MEDICINE CLINIC | Age: 69
End: 2024-07-08

## 2024-07-08 ENCOUNTER — HOSPITAL ENCOUNTER (OUTPATIENT)
Dept: MRI IMAGING | Age: 69
Discharge: HOME | End: 2024-07-08
Payer: MEDICARE

## 2024-07-08 DIAGNOSIS — R51.9 TEMPORAL PAIN: ICD-10-CM

## 2024-07-08 LAB
CREAT BLD-MCNC: 0.7 MG/DL (ref 0.5–1.2)
GFR SERPL CREATININE-BSD FRML MDRD: > 90 ML/MIN/1.73M2

## 2024-07-08 PROCEDURE — A9579 GAD-BASE MR CONTRAST NOS,1ML: HCPCS | Performed by: NURSE PRACTITIONER

## 2024-07-08 PROCEDURE — 70553 MRI BRAIN STEM W/O & W/DYE: CPT

## 2024-07-08 PROCEDURE — 82565 ASSAY OF CREATININE: CPT

## 2024-07-08 PROCEDURE — 6360000004 HC RX CONTRAST MEDICATION: Performed by: NURSE PRACTITIONER

## 2024-07-08 RX ADMIN — GADOTERIDOL 20 ML: 279.3 INJECTION, SOLUTION INTRAVENOUS at 09:45

## 2024-07-10 ENCOUNTER — TELEPHONE (OUTPATIENT)
Dept: FAMILY MEDICINE CLINIC | Age: 69
End: 2024-07-10

## 2024-07-10 NOTE — TELEPHONE ENCOUNTER
----- Message from PATEL Perez CNP sent at 7/8/2024 12:00 PM EDT -----  MRI of the brain shows some mild atrophy and ischemic changes but no evidence of infarct.  No obvious reason for her temple pains.  Would she be interested in seeing pain management for further evaluation and treatment?  TS

## 2024-11-13 DIAGNOSIS — G47.8 NON-RESTORATIVE SLEEP: ICD-10-CM

## 2024-11-13 DIAGNOSIS — F32.A DEPRESSION, UNSPECIFIED DEPRESSION TYPE: ICD-10-CM

## 2024-11-13 DIAGNOSIS — E55.9 VITAMIN D DEFICIENCY: ICD-10-CM

## 2024-11-14 RX ORDER — FUROSEMIDE 40 MG/1
TABLET ORAL
Qty: 90 TABLET | Refills: 3 | Status: SHIPPED | OUTPATIENT
Start: 2024-11-14

## 2024-11-14 RX ORDER — ERGOCALCIFEROL 1.25 MG/1
CAPSULE, LIQUID FILLED ORAL
Qty: 24 CAPSULE | Refills: 3 | Status: SHIPPED | OUTPATIENT
Start: 2024-11-14

## 2024-11-14 RX ORDER — TRAZODONE HYDROCHLORIDE 50 MG/1
TABLET, FILM COATED ORAL
Qty: 90 TABLET | Refills: 3 | Status: SHIPPED | OUTPATIENT
Start: 2024-11-14

## 2024-11-14 NOTE — TELEPHONE ENCOUNTER
Rx EP'd to pharmacy.  Please notify patient.      Requested Prescriptions     Signed Prescriptions Disp Refills    FLUoxetine (PROZAC) 20 MG capsule 180 capsule 3     Sig: TAKE 2 CAPSULES BY MOUTH EVERY DAY     Authorizing Provider: JEREMIE BRYAN    furosemide (LASIX) 40 MG tablet 90 tablet 3     Sig: TAKE 1 TABLET BY MOUTH EVERY DAY AS NEEDED FOR SWELLING     Authorizing Provider: JEREMIE BRYAN    traZODone (DESYREL) 50 MG tablet 90 tablet 3     Sig: TAKE 1 TABLET BY MOUTH IN THE EVENING AS NEEDED FOR SLEEP     Authorizing Provider: JEREMIE BRYAN    vitamin D (ERGOCALCIFEROL) 1.25 MG (39775 UT) CAPS capsule 24 capsule 3     Sig: TAKE 1 CAPSULE BY MOUTH TWO TIMES A WEEK     Authorizing Provider: JEREMIE BRYAN           Electronically signed by Jeremie Bryan MD on 11/14/2024 at 11:04 AM

## 2024-11-14 NOTE — TELEPHONE ENCOUNTER
This medication refill is regarding a MyChart request. Refill requested by patient.    Requested Prescriptions     Pending Prescriptions Disp Refills    FLUoxetine (PROZAC) 20 MG capsule 180 capsule 3     Sig: TAKE 2 CAPSULES BY MOUTH EVERY DAY    furosemide (LASIX) 40 MG tablet 90 tablet 3     Sig: TAKE 1 TABLET BY MOUTH EVERY DAY AS NEEDED FOR SWELLING    traZODone (DESYREL) 50 MG tablet 90 tablet 3     Sig: TAKE 1 TABLET BY MOUTH IN THE EVENING AS NEEDED FOR SLEEP    vitamin D (ERGOCALCIFEROL) 1.25 MG (91057 UT) CAPS capsule 24 capsule 3     Sig: TAKE 1 CAPSULE BY MOUTH TWO TIMES A WEEK       Date of last visit: 5/30/2024   Date of next visit: 12/16/2024  Date of last refill: 1/15/24 for year supply to [facility]. Pt is working at [facility] and prefers to have the Rx's sent to [facility].     Last CMP:   Lab Results   Component Value Date     03/21/2024    K 4.0 03/21/2024     03/21/2024    CO2 27 03/21/2024    BUN 21 03/21/2024    CREATININE 0.7 07/08/2024    GLUCOSE 86 03/21/2024    CALCIUM 8.7 03/21/2024    BILITOT 0.9 03/21/2024    ALKPHOS 100 03/21/2024    AST 18 03/21/2024    ALT 18 03/21/2024    LABGLOM > 90 07/08/2024    GFRAA >60 07/20/2011    AGRATIO 1.9 07/20/2011     Rx's verified, ordered and set to EP.     AMBER

## 2024-12-12 SDOH — HEALTH STABILITY: PHYSICAL HEALTH: ON AVERAGE, HOW MANY DAYS PER WEEK DO YOU ENGAGE IN MODERATE TO STRENUOUS EXERCISE (LIKE A BRISK WALK)?: 2 DAYS

## 2024-12-12 SDOH — HEALTH STABILITY: PHYSICAL HEALTH: ON AVERAGE, HOW MANY MINUTES DO YOU ENGAGE IN EXERCISE AT THIS LEVEL?: 50 MIN

## 2024-12-12 ASSESSMENT — LIFESTYLE VARIABLES
HOW MANY STANDARD DRINKS CONTAINING ALCOHOL DO YOU HAVE ON A TYPICAL DAY: 0
HOW OFTEN DO YOU HAVE A DRINK CONTAINING ALCOHOL: NEVER
HOW OFTEN DO YOU HAVE A DRINK CONTAINING ALCOHOL: 1
HOW OFTEN DO YOU HAVE SIX OR MORE DRINKS ON ONE OCCASION: 1
HOW MANY STANDARD DRINKS CONTAINING ALCOHOL DO YOU HAVE ON A TYPICAL DAY: PATIENT DOES NOT DRINK

## 2024-12-12 ASSESSMENT — PATIENT HEALTH QUESTIONNAIRE - PHQ9
2. FEELING DOWN, DEPRESSED OR HOPELESS: NOT AT ALL
SUM OF ALL RESPONSES TO PHQ9 QUESTIONS 1 & 2: 0
SUM OF ALL RESPONSES TO PHQ QUESTIONS 1-9: 0
SUM OF ALL RESPONSES TO PHQ QUESTIONS 1-9: 0
1. LITTLE INTEREST OR PLEASURE IN DOING THINGS: NOT AT ALL
SUM OF ALL RESPONSES TO PHQ QUESTIONS 1-9: 0
SUM OF ALL RESPONSES TO PHQ QUESTIONS 1-9: 0

## 2024-12-16 ENCOUNTER — OFFICE VISIT (OUTPATIENT)
Dept: FAMILY MEDICINE CLINIC | Age: 69
End: 2024-12-16
Payer: COMMERCIAL

## 2024-12-16 VITALS
BODY MASS INDEX: 30.13 KG/M2 | SYSTOLIC BLOOD PRESSURE: 96 MMHG | RESPIRATION RATE: 16 BRPM | HEIGHT: 67 IN | WEIGHT: 192 LBS | HEART RATE: 64 BPM | TEMPERATURE: 97.6 F | DIASTOLIC BLOOD PRESSURE: 76 MMHG

## 2024-12-16 DIAGNOSIS — Z00.00 ANNUAL PHYSICAL EXAM: Primary | ICD-10-CM

## 2024-12-16 DIAGNOSIS — N25.81 SECONDARY HYPERPARATHYROIDISM (HCC): ICD-10-CM

## 2024-12-16 DIAGNOSIS — E53.8 B12 DEFICIENCY: ICD-10-CM

## 2024-12-16 DIAGNOSIS — K90.9 INTESTINAL MALABSORPTION, UNSPECIFIED TYPE: ICD-10-CM

## 2024-12-16 DIAGNOSIS — Z12.31 ENCOUNTER FOR SCREENING MAMMOGRAM FOR MALIGNANT NEOPLASM OF BREAST: ICD-10-CM

## 2024-12-16 DIAGNOSIS — E55.9 VITAMIN D DEFICIENCY: ICD-10-CM

## 2024-12-16 PROBLEM — N62 MACROMASTIA: Status: RESOLVED | Noted: 2020-10-26 | Resolved: 2024-12-16

## 2024-12-16 PROBLEM — Z98.890 S/P PANNICULECTOMY: Status: RESOLVED | Noted: 2020-10-26 | Resolved: 2024-12-16

## 2024-12-16 PROCEDURE — G8484 FLU IMMUNIZE NO ADMIN: HCPCS | Performed by: FAMILY MEDICINE

## 2024-12-16 PROCEDURE — 99397 PER PM REEVAL EST PAT 65+ YR: CPT | Performed by: FAMILY MEDICINE

## 2024-12-16 PROCEDURE — 1159F MED LIST DOCD IN RCRD: CPT | Performed by: FAMILY MEDICINE

## 2024-12-16 RX ORDER — AMMONIUM LACTATE 12 G/100G
LOTION TOPICAL
COMMUNITY
Start: 2024-10-28

## 2024-12-16 ASSESSMENT — ENCOUNTER SYMPTOMS
EYES NEGATIVE: 1
SHORTNESS OF BREATH: 0
DIARRHEA: 0
NAUSEA: 0
ABDOMINAL PAIN: 0
VOMITING: 0
BLOOD IN STOOL: 0

## 2024-12-16 NOTE — PROGRESS NOTES
with Auto Differential; Future    Iron; Future    Vitamin B12 & Folate; Future    Encounter for screening mammogram for malignant neoplasm of breast       Orders:    [facility] LISA DIGITAL SCREEN BILATERAL; Future      Return in about 1 year (around 12/16/2025).           --Joselin Bryan MD

## 2024-12-16 NOTE — ASSESSMENT & PLAN NOTE
Chronic, at goal (stable), continue current treatment plan    Orders:    Vitamin D 25 Hydroxy; Future

## 2024-12-16 NOTE — ASSESSMENT & PLAN NOTE
Chronic, at goal (stable), continue current treatment plan    Orders:    CBC with Auto Differential; Future    Iron; Future    Vitamin B12 & Folate; Future

## 2024-12-16 NOTE — ASSESSMENT & PLAN NOTE
Chronic, at goal (stable), continue current treatment plan    Orders:    CBC with Auto Differential; Future    Vitamin B12 & Folate; Future

## 2024-12-16 NOTE — ASSESSMENT & PLAN NOTE
Chronic, at goal (stable), continue current treatment plan    Orders:    Vitamin D 25 Hydroxy; Future    PTH, Intact; Future

## 2025-04-07 ENCOUNTER — RESULTS FOLLOW-UP (OUTPATIENT)
Dept: FAMILY MEDICINE CLINIC | Age: 70
End: 2025-04-07

## 2025-04-07 LAB
A/G RATIO: 2 (ref 1.5–2.5)
ALBUMIN: 4.1 G/DL (ref 3.5–5)
ALP BLD-CCNC: 100 IU/L (ref 39–118)
ALT SERPL-CCNC: 26 IU/L (ref 10–40)
ANION GAP SERPL CALCULATED.3IONS-SCNC: 6 MMOL/L (ref 4–12)
AST SERPL-CCNC: 21 IU/L (ref 15–41)
BASOPHILS ABSOLUTE: 0 /CMM (ref 0–200)
BASOPHILS RELATIVE PERCENT: 0.8 % (ref 0–2)
BILIRUB SERPL-MCNC: 1.1 MG/DL (ref 0.2–1)
BUN BLDV-MCNC: 14 MG/DL (ref 7–20)
CALCIUM SERPL-MCNC: 8.3 MG/DL (ref 8.8–10.5)
CHLORIDE BLD-SCNC: 108 MEQ/L (ref 101–111)
CHOLESTEROL, TOTAL: 144 MG/DL
CHOLESTEROL/HDL RELATIVE RISK: 2.4 (ref 4–4.4)
CO2: 29 MEQ/L (ref 21–32)
CREAT SERPL-MCNC: 0.69 MG/DL (ref 0.6–1.3)
CREATININE CLEARANCE: >60
DIRECT-LDL / HDL RISK: 1.2
EOSINOPHILS ABSOLUTE: 100 /CMM (ref 0–500)
EOSINOPHILS RELATIVE PERCENT: 2.5 % (ref 0–6)
FOLATE: > 24.8 NG/ML
GLUCOSE: 63 MG/DL (ref 70–110)
HCT VFR BLD CALC: 37.1 % (ref 35–44)
HDLC SERPL-MCNC: 58 MG/DL
HEMOGLOBIN: 12.2 GM/DL (ref 12–15)
IRON: 81 MCG/DL (ref 28–170)
LDL CHOLESTEROL DIRECT: 73 MG/DL
LYMPHOCYTES ABSOLUTE: 1200 /CMM (ref 1000–4800)
LYMPHOCYTES RELATIVE PERCENT: 31 % (ref 15–45)
MCH RBC QN AUTO: 29.6 PG (ref 27.5–33)
MCHC RBC AUTO-ENTMCNC: 32.8 GM/DL (ref 33–36)
MCV RBC AUTO: 90.2 CU MIC (ref 80–97)
MONOCYTES ABSOLUTE: 400 /CMM (ref 0–800)
MONOCYTES RELATIVE PERCENT: 11 % (ref 2–10)
NEUTROPHILS ABSOLUTE: 2100 /CMM (ref 1800–7700)
NEUTROPHILS RELATIVE PERCENT: 54.7 % (ref 40–70)
NUCLEATED RBCS: 0.2 /100 WBC
PDW BLD-RTO: 13.6 % (ref 12–16)
PLATELET # BLD: 147 TH/CMM (ref 150–400)
POTASSIUM SERPL-SCNC: 3.8 MEQ/L (ref 3.6–5)
PTH INTACT: 131.1 PG/ML (ref 12–88)
RBC # BLD: 4.11 MIL/CMM (ref 4–5.1)
SODIUM BLD-SCNC: 143 MEQ/L (ref 135–145)
TOTAL PROTEIN: 6.2 G/DL (ref 6.2–8)
TRIGL SERPL-MCNC: 78 MG/DL
VITAMIN B-12: 245 PG/ML (ref 180–914)
VITAMIN D 25-HYDROXY: 64.3 NG/ML (ref 30–100)
VLDLC SERPL CALC-MCNC: 15 MG/DL
WBC # BLD: 3.8 TH/CMM (ref 4.4–10.5)

## 2025-04-09 NOTE — TELEPHONE ENCOUNTER
Spoke to the pt and notified her of the lab results and CG recommendations and she is agreeable to the endocrinology referral. She is going to check with her insurance to find an in network provider and then will contact the office with the information so we can place the referral.     Will await call back.

## 2025-04-22 ENCOUNTER — PATIENT MESSAGE (OUTPATIENT)
Dept: FAMILY MEDICINE CLINIC | Age: 70
End: 2025-04-22

## 2025-04-22 DIAGNOSIS — R79.89 ELEVATED PTHRP LEVEL: Primary | ICD-10-CM

## 2025-08-08 ENCOUNTER — OFFICE VISIT (OUTPATIENT)
Age: 70
End: 2025-08-08

## 2025-08-08 VITALS
HEIGHT: 67 IN | WEIGHT: 194.5 LBS | DIASTOLIC BLOOD PRESSURE: 72 MMHG | HEART RATE: 59 BPM | SYSTOLIC BLOOD PRESSURE: 98 MMHG | BODY MASS INDEX: 30.53 KG/M2

## 2025-08-08 DIAGNOSIS — Z98.84 HISTORY OF ROUX-EN-Y GASTRIC BYPASS: ICD-10-CM

## 2025-08-08 DIAGNOSIS — E21.3 HYPERPARATHYROIDISM: Primary | ICD-10-CM

## 2025-08-08 DIAGNOSIS — E83.51 HYPOCALCEMIA: ICD-10-CM

## 2025-08-18 LAB
ANION GAP SERPL CALCULATED.3IONS-SCNC: 6 MMOL/L (ref 4–12)
BUN BLDV-MCNC: 13 MG/DL (ref 7–20)
CALCIUM 24 HOUR URINE: 56 MG/24 HR (ref 100–300)
CALCIUM SERPL-MCNC: 8.8 MG/DL (ref 8.8–10.5)
CALCIUM URINE: 5.1 MG/DL
CHLORIDE BLD-SCNC: 107 MEQ/L (ref 101–111)
CO2: 28 MEQ/L (ref 21–32)
CREAT SERPL-MCNC: 0.73 MG/DL (ref 0.6–1.3)
CREATININE 24 HOUR UR: 0.8 GM/24 HR (ref 0.6–2)
CREATININE CLEARANCE: >60
CREATININE, RANDOM URINE: 75.4 MG/DL
GLUCOSE: 76 MG/DL (ref 70–110)
Lab: 1440 MINUTES
MAGNESIUM: 2.3 MG/DL (ref 1.8–2.5)
POTASSIUM SERPL-SCNC: 4.1 MEQ/L (ref 3.6–5)
PTH INTACT: 107.7 PG/ML (ref 12–88)
SODIUM 24 HOUR URINE: 122 MEQ/24HR (ref 40–220)
SODIUM BLD-SCNC: 141 MEQ/L (ref 135–145)
SODIUM URINE: 111 MEQ/L
URINE TOTAL VOLUME: 1100 ML
VITAMIN D 25-HYDROXY: 65.5 NG/ML (ref 30–100)

## 2025-09-01 ENCOUNTER — CLINICAL DOCUMENTATION (OUTPATIENT)
Age: 70
End: 2025-09-01

## 2025-09-01 DIAGNOSIS — E21.3 HYPERPARATHYROIDISM: Primary | ICD-10-CM

## 2025-09-03 ENCOUNTER — CLINICAL DOCUMENTATION (OUTPATIENT)
Age: 70
End: 2025-09-03

## 2025-09-03 DIAGNOSIS — E21.3 HYPERPARATHYROIDISM: ICD-10-CM

## (undated) DEVICE — BINDER ABD SM M W12IN CIRC 30 45IN 4 PNL E CNTCT CLSR POSTOP

## (undated) DEVICE — PAD,ABDOMINAL,5"X9",ST,LF,25/BX: Brand: MEDLINE INDUSTRIES, INC.

## (undated) DEVICE — APPLICATOR MEDICATED 26 CC SOLUTION HI LT ORNG CHLORAPREP

## (undated) DEVICE — SYRINGE MED 10ML LUERLOCK TIP W/O SFTY DISP

## (undated) DEVICE — GAUZE,SPONGE,4"X4",12PLY,STERILE,LF,2'S: Brand: MEDLINE

## (undated) DEVICE — 450 ML BOTTLE OF 0.05% CHLORHEXIDINE GLUCONATE IN 99.95% STERILE WATER FOR IRRIGATION, USP AND APPLICATOR.: Brand: IRRISEPT ANTIMICROBIAL WOUND LAVAGE

## (undated) DEVICE — COVER ARMBRD W13XL28.5IN IMPERV BLU FOR OP RM

## (undated) DEVICE — NEEDLE SPNL 22GA L3.5IN BLK HUB S STL REG WALL FIT STYL W/

## (undated) DEVICE — TOWEL,OR,DSP,ST,BLUE,STD,4/PK,20PK/CS: Brand: MEDLINE

## (undated) DEVICE — 6 ML SYRINGE LUER-LOCK TIP: Brand: MONOJECT

## (undated) DEVICE — SUTURE MCRYL SZ 4-0 L27IN ABSRB UD L19MM PS-2 1/2 CIR PRIM Y426H

## (undated) DEVICE — NEEDLE SYR 18GA L1.5IN RED PLAS HUB S STL BLNT FILL W/O

## (undated) DEVICE — TUBING, SUCTION, 1/4" X 20', STRAIGHT: Brand: MEDLINE INDUSTRIES, INC.

## (undated) DEVICE — TOTAL TRAY, DB, 100% SILI FOLEY, 16FR 10: Brand: MEDLINE

## (undated) DEVICE — 4-PORT MANIFOLD: Brand: NEPTUNE 2

## (undated) DEVICE — 3 ML SYRINGE LUER-LOCK TIP: Brand: MONOJECT

## (undated) DEVICE — GOWN,SIRUS,NONRNF,SETINSLV,XL,20/CS: Brand: MEDLINE

## (undated) DEVICE — SHEET,DRAPE,3/4,53X77,STERILE: Brand: MEDLINE

## (undated) DEVICE — HYPODERMIC SAFETY NEEDLE: Brand: MAGELLAN

## (undated) DEVICE — Z DISCONTINUED USE 2558683 BANDAGE COMPR 2XL MAMM COMFORTABLE HIGHLY ABSRB POST SURG

## (undated) DEVICE — GLOVE ORANGE PI 7   MSG9070

## (undated) DEVICE — DRAIN CHN 19FR L0.25IN DIA6.3MM SIL RND HUBLESS FULL FLUT

## (undated) DEVICE — SUTURE STRATAFIX SPRL SZ 1 L14IN ABSRB VLT L48CM CTX 1/2 SXPD2B405

## (undated) DEVICE — KIT PRB 17GA L100MM MULTI-3 COOLED RF W/ 4MM ACT TIP

## (undated) DEVICE — GOWN,SIRUS,NON REINFRCD,LARGE,SET IN SL: Brand: MEDLINE

## (undated) DEVICE — EVACUATOR SURG 100CC SIL BLB SUCT RESVR FOR CLS WND DRNGE

## (undated) DEVICE — SPONGE LAP W18XL18IN WHT COT 4 PLY FLD STRUNG RADPQ DISP ST

## (undated) DEVICE — SPONGE DRN W4XL4IN RAYON/POLYESTER 6 PLY NONWOVEN PRECUT

## (undated) DEVICE — PENCIL SMK EVAC ALL IN 1 DSGN ENH VISIBILITY IMPROVED AIR

## (undated) DEVICE — GLOVE SURG SZ 65 THK91MIL LTX FREE SYN POLYISOPRENE

## (undated) DEVICE — SYRINGE MED 50ML LUERLOCK TIP

## (undated) DEVICE — SPONGE GZ W4XL4IN COT 12 PLY TYP VII WVN C FLD DSGN

## (undated) DEVICE — BASIC SINGLE BASIN BTC-LF: Brand: MEDLINE INDUSTRIES, INC.

## (undated) DEVICE — ZINACTIVE USE 2537982 PAD GRND FOR RF PAIN MGMT DISP

## (undated) DEVICE — APPLIER LIG CLP M L11IN TI STR RNG HNDL FOR 20 CLP DISP

## (undated) DEVICE — INTENDED FOR TISSUE SEPARATION, AND OTHER PROCEDURES THAT REQUIRE A SHARP SURGICAL BLADE TO PUNCTURE OR CUT.: Brand: BARD-PARKER ® CARBON RIB-BACK BLADES

## (undated) DEVICE — ADHESIVE SKIN CLSR 0.7ML TOP DERMBND ADV

## (undated) DEVICE — PACK PROCEDURE SURG SET UP SRMC

## (undated) DEVICE — YANKAUER,BULB TIP,W/O VENT,RIGID,STERILE: Brand: MEDLINE

## (undated) DEVICE — SYSTEM SKIN CLSR 22CM DERMBND PRINEO

## (undated) DEVICE — SYRINGE MED 5ML STD CLR PLAS LUERLOCK TIP N CTRL DISP

## (undated) DEVICE — SUTURE MCRYL SZ 2-0 L27IN ABSRB UD SH L26MM TAPERPOINT NDL Y417H

## (undated) DEVICE — Device